# Patient Record
Sex: FEMALE | Race: WHITE | NOT HISPANIC OR LATINO | URBAN - METROPOLITAN AREA
[De-identification: names, ages, dates, MRNs, and addresses within clinical notes are randomized per-mention and may not be internally consistent; named-entity substitution may affect disease eponyms.]

---

## 2019-06-11 ENCOUNTER — EMERGENCY (EMERGENCY)
Facility: HOSPITAL | Age: 34
LOS: 1 days | Discharge: HOME | End: 2019-06-11
Attending: EMERGENCY MEDICINE
Payer: MEDICAID

## 2019-06-11 VITALS
RESPIRATION RATE: 16 BRPM | SYSTOLIC BLOOD PRESSURE: 113 MMHG | OXYGEN SATURATION: 96 % | DIASTOLIC BLOOD PRESSURE: 76 MMHG | HEART RATE: 85 BPM | TEMPERATURE: 98 F | HEIGHT: 64 IN | WEIGHT: 199.96 LBS

## 2019-06-11 LAB — HCG UR QL: NEGATIVE — SIGNIFICANT CHANGE UP

## 2019-06-11 PROCEDURE — 99283 EMERGENCY DEPT VISIT LOW MDM: CPT

## 2019-06-11 PROCEDURE — 81025 URINE PREGNANCY TEST: CPT

## 2019-06-11 PROCEDURE — 82962 GLUCOSE BLOOD TEST: CPT

## 2019-06-11 RX ORDER — FAMOTIDINE 10 MG/ML
20 INJECTION INTRAVENOUS ONCE
Refills: 0 | Status: COMPLETED | OUTPATIENT
Start: 2019-06-11 | End: 2019-06-11

## 2019-06-11 RX ORDER — DIPHENHYDRAMINE HCL 50 MG
1 CAPSULE ORAL
Qty: 3 | Refills: 0
Start: 2019-06-11 | End: 2019-06-13

## 2019-06-11 RX ORDER — DEXAMETHASONE 0.5 MG/5ML
10 ELIXIR ORAL ONCE
Refills: 0 | Status: DISCONTINUED | OUTPATIENT
Start: 2019-06-11 | End: 2019-06-11

## 2019-06-11 RX ORDER — DIPHENHYDRAMINE HCL 50 MG
50 CAPSULE ORAL ONCE
Refills: 0 | Status: COMPLETED | OUTPATIENT
Start: 2019-06-11 | End: 2019-06-11

## 2019-06-11 RX ADMIN — Medication 60 MILLIGRAM(S): at 21:54

## 2019-06-11 RX ADMIN — Medication 50 MILLIGRAM(S): at 21:47

## 2019-06-11 RX ADMIN — FAMOTIDINE 20 MILLIGRAM(S): 10 INJECTION INTRAVENOUS at 21:47

## 2019-06-11 NOTE — ED PROVIDER NOTE - OBJECTIVE STATEMENT
33yoF previously healthy presents with very itchy rash that started 2 days ago to her b/l arms, has spread to her face and periorbital area. Friday had salvatore, 2 days ago was in woods locally and received mosquito bites to arms. She is concerned this might be a pre-pregnancy rash. Was at [facility] 2 days ago, received benadryl with improvement and was discharged on no medications, however symptoms returned afterward. Denies SOB or throat closing, new meds, lotions, detergents, or any other exposures. No PMHs of allergies or eczema. 33yoF previously healthy presents with very itchy rash that started 2 days ago to her b/l arms, has spread to her face and periorbital area. Friday had salvatore, 2 days ago was in woods locally and received mosquito bites to arms. She is concerned this might be a pre-pregnancy rash. Was at [facility] 2 days ago, received benadryl with improvement and was discharged on no medications, however symptoms returned afterward. Denies SOB or throat closing, new meds, lotions, detergents, or any other exposures. No PMHs of allergies or eczema. UTD on all childhood vaccines

## 2019-06-11 NOTE — ED ADULT NURSE NOTE - OBJECTIVE STATEMENT
Rashes on both arms with severe itchiness for 2 days denies any contact with plants, no fever and today had rashes on left side of face with itchiness

## 2019-06-11 NOTE — ED PROVIDER NOTE - CLINICAL SUMMARY MEDICAL DECISION MAKING FREE TEXT BOX
Appearance and character of high suspicion for contact dermatitis, likely from exposure in woods. No e/o anaphylaxis. Character Appearance and character of high suspicion for contact dermatitis, likely from exposure in woods. No e/o anaphylaxis. Character of low suspicion for RMSF or Lyme. Negative pregnancy test. Patient is well appearing, NAD, afebrile, hemodynamically stable. Discharged with instructions in further symptomatic care, return precautions, and need for derm f/u and given list for derm f/u.

## 2019-06-11 NOTE — ED ADULT NURSE NOTE - NSIMPLEMENTINTERV_GEN_ALL_ED
Implemented All Universal Safety Interventions:  [place] to call system. Call bell, personal items and telephone within reach. Instruct patient to call for assistance. Room bathroom lighting operational. Non-slip footwear when patient is off stretcher. Physically safe environment: no spills, clutter or unnecessary equipment. Stretcher in lowest position, wheels locked, appropriate side rails in place.

## 2019-06-11 NOTE — ED ADULT TRIAGE NOTE - CHIEF COMPLAINT QUOTE
Rashes on both arms started 2 days ago with itchiness and today rash on face with itchiness denies fever

## 2019-06-11 NOTE — ED ADULT NURSE NOTE - CAS EDP DISCH TYPE
OFFICE PROGRESS NOTE / Follow UP    Yuki King is  a 68 year old female who presented requesting follow up evaluation for right thumb.  Here for MRI results  Pain absent.    Numbness persists and has not changed.   Intervention:  none  Improvement:  no      ROS:   No history of neck pain  No history of radicular pain into upper extremities    Current Outpatient Prescriptions   Medication   • lisinopril (ZESTRIL) 10 MG tablet   • hydrocortisone (CORTIZONE) 1 % cream   • atorvastatin (LIPITOR) 20 MG tablet   • triamcinolone (ARISTOCORT) 0.1 % ointment   • Glucosamine 500 MG Cap   • vitamin - therapeutic multivitamins w/minerals (CENTRUM SILVER,THERA-M) TABS   • VITAMIN D, ERGOCALCIFEROL, PO   • CALCIUM CARBONATE-VITAMIN D PO   • fish oil-omega-3 fatty acids 1000 MG CAPS   • acetaminophen (TYLENOL) 325 MG tablet     No current facility-administered medications for this visit.      Facility-Administered Medications Ordered in Other Visits   Medication   • ceFAZolin (ANCEF) injection     Past Medical History   Diagnosis Date   • Benign essential HTN 2/1/2017   • Bunion      right foot   • High cholesterol 8/21/2015   • Malignant neoplasm 2010     Left breast. post lumpectomy and radiation. On Tamoxifen   • Osteoarthritis of foot      right; w/bone spur   • Primary osteoarthritis of hand 1/2/2017   • Spongiotic dermatitis      Past Surgical History   Procedure Laterality Date   • Colonoscopy  10/20/2009   • Mastec partial w axill node remov  06/25/2010   • Bunionectomy  11/04/2010   • Tonsillectomy and adenoidectomy     • Ovarian cyst surgery     • Polypectomy  03/27/2003   • Colonoscopy diagnostic  03/29/2004   • Cholecystectomy  [id number]   • Breast biopsy       left breast; calcifications   • Colonoscopy  11/4/14     Social History     Occupational History   • Not on file.     Social History Main Topics   • Smoking status: Former Smoker     Types: Cigarettes     Quit date: 7/15/2008   • Smokeless tobacco:  Never Used   • Alcohol use 1.8 oz/week     3 Standard drinks or equivalent per week   • Drug use: No   • Sexual activity: Yes     Partners: Male     ALLERGIES:   Allergen Reactions   • Naproxen Other (See Comments)     Blister on back     Family History   Problem Relation Age of Onset   • Hypertension Mother    • Osteoporosis Mother    • Arthritis Mother      OA   • Cancer Brother 48     colon   • Cancer Maternal Grandmother 60       There were no vitals taken for this visit.    OT / Measurements:  AROM R thumb RA 40, PA 42, flexion full to base of SF    Numbness persists radial thumb tip at 3.61 on Bowen monofilaments    R  39, L 35  R lat pinch 8, L 8      Right thumb: There is one spot on the ulnar border just distal to the metacarpophalangeal joint or palpation along the digital nerve causes discomfort and reproduction of the tingling in the same distribution.    MRI: Does not demonstrate any definitive soft tissue structures, ganglion cysts or neuromas that would explain the digital nerve irritation    Assessment plan:  I explained the patient that I do not have a great answer for her numbness on the ulnar border of the thumb. There does tend to get irritated with palpation in this area around the metacarpal phalangeal joint. As both a therapeutic and diagnostic technique I've offered her a cortisone injection and possibly cause decrease and localized swelling and decreased swelling around the nerve sheath. The patient has good relief for quite some time we will know that there might be a constriction or some kind of impedance in this area. Patient verbalized understanding and would like to try the steroid injection.    The patient's right thumb was prepped with ChloraPrep. A 0.5 cc of 3 mg of Celestone was injected in the area near the digital nerve. Seizure well.    Patient will follow-up on a p.r.n. basis.       Home

## 2019-06-11 NOTE — ED PROVIDER NOTE - PHYSICAL EXAMINATION
Afebrile, hemodynamically stable, saturating well  NAD, well appearing  Head NCAT  EOMI grossly, anicteric  MMM, uvula midline, no oropharyngeal/tongue/lip swelling  RRR, nml S1/S2, no m/r/g  Lungs CTAB, no w/r/r  Abd soft, NT, ND, nml BS, no rebound or guarding  AAO, CN's 3-12 grossly intact  CHANG spontaneously, no leg cyanosis or edema  Skin warm, well perfused. Afebrile, hemodynamically stable, saturating well  NAD, well appearing  Head NCAT  EOMI grossly, anicteric  MMM, uvula midline, no oropharyngeal/tongue/lip swelling  RRR, nml S1/S2, no m/r/g  Lungs CTAB, no w/r/r  Abd soft, NT, ND, nml BS, no rebound or guarding  AAO, CN's 3-12 grossly intact  CHANG spontaneously, no leg cyanosis or edema  Skin warm, well perfused. Linear vesicular orange-colored papules to L extensor arm with scattered papules to b/l arms, chest, back

## 2021-09-24 ENCOUNTER — EMERGENCY (EMERGENCY)
Facility: HOSPITAL | Age: 36
LOS: 1 days | Discharge: HOME | End: 2021-09-24
Attending: EMERGENCY MEDICINE
Payer: MEDICAID

## 2021-09-24 VITALS
OXYGEN SATURATION: 97 % | RESPIRATION RATE: 17 BRPM | HEIGHT: 64.96 IN | TEMPERATURE: 98 F | DIASTOLIC BLOOD PRESSURE: 76 MMHG | SYSTOLIC BLOOD PRESSURE: 117 MMHG | WEIGHT: 212.08 LBS | HEART RATE: 67 BPM

## 2021-09-24 PROCEDURE — 99284 EMERGENCY DEPT VISIT MOD MDM: CPT

## 2021-09-24 RX ORDER — IBUPROFEN 200 MG
600 TABLET ORAL ONCE
Refills: 0 | Status: COMPLETED | OUTPATIENT
Start: 2021-09-24 | End: 2021-09-24

## 2021-09-24 RX ORDER — IBUPROFEN 200 MG
1 TABLET ORAL
Qty: 20 | Refills: 0
Start: 2021-09-24

## 2021-09-24 RX ORDER — CETIRIZINE HYDROCHLORIDE 10 MG/1
1 TABLET ORAL
Qty: 20 | Refills: 0
Start: 2021-09-24

## 2021-09-24 RX ORDER — NYSTATIN/TRIAMCINOLONE ACET
1 OINTMENT (GRAM) TOPICAL
Qty: 1 | Refills: 0
Start: 2021-09-24 | End: 2021-10-07

## 2021-09-24 NOTE — ED PROVIDER NOTE - CLINICAL SUMMARY MEDICAL DECISION MAKING FREE TEXT BOX
Patient with possible COVID or other viral syndrome. Will tx with supportive care. f/u with PMD in 1 week. Return to the ED immediately if getting worse, not improving, or if having any new or troubling symptoms.

## 2021-09-24 NOTE — ED PROVIDER NOTE - PATIENT PORTAL LINK FT
You can access the FollowMyHealth Patient Portal offered by [facility] by registering at the following website: [email or web address]. By joining [employer]’s FollowMyHealth portal, you will also be able to view your health information using other applications (apps) compatible with our system.

## 2021-09-24 NOTE — ED PROVIDER NOTE - OBJECTIVE STATEMENT
Patient reports 1 week sore throat, rhinorrhea, diarrhea. Daughter had COVID exposure at school. No fever, ha, cp, sob, ap, n/v.

## 2021-09-24 NOTE — ED ADULT TRIAGE NOTE - CHIEF COMPLAINT QUOTE
'I have sore throat for 1-2 weeks. My daughter's school sent out a notice that someone was exposed to covid so we were told to come in for covid test."

## 2021-09-25 LAB — SARS-COV-2 RNA SPEC QL NAA+PROBE: SIGNIFICANT CHANGE UP

## 2021-09-25 PROCEDURE — 99283 EMERGENCY DEPT VISIT LOW MDM: CPT

## 2021-09-25 PROCEDURE — 87635 SARS-COV-2 COVID-19 AMP PRB: CPT

## 2021-09-25 RX ADMIN — Medication 600 MILLIGRAM(S): at 00:32

## 2021-11-28 ENCOUNTER — EMERGENCY (EMERGENCY)
Facility: HOSPITAL | Age: 36
LOS: 1 days | Discharge: HOME | End: 2021-11-28
Attending: EMERGENCY MEDICINE
Payer: MEDICAID

## 2021-11-28 VITALS
RESPIRATION RATE: 18 BRPM | TEMPERATURE: 99 F | OXYGEN SATURATION: 98 % | WEIGHT: 190.04 LBS | HEIGHT: 64 IN | DIASTOLIC BLOOD PRESSURE: 54 MMHG | HEART RATE: 81 BPM | SYSTOLIC BLOOD PRESSURE: 107 MMHG

## 2021-11-28 PROBLEM — Z78.9 OTHER SPECIFIED HEALTH STATUS: Chronic | Status: ACTIVE | Noted: 2021-09-24

## 2021-11-28 LAB
RAPID RVP RESULT: DETECTED
RV+EV RNA SPEC QL NAA+PROBE: DETECTED
SARS-COV-2 RNA SPEC QL NAA+PROBE: SIGNIFICANT CHANGE UP

## 2021-11-28 PROCEDURE — 0225U NFCT DS DNA&RNA 21 SARSCOV2: CPT

## 2021-11-28 PROCEDURE — 99284 EMERGENCY DEPT VISIT MOD MDM: CPT

## 2021-11-28 PROCEDURE — 99283 EMERGENCY DEPT VISIT LOW MDM: CPT

## 2021-11-28 PROCEDURE — 87081 CULTURE SCREEN ONLY: CPT

## 2021-11-28 RX ORDER — DEXAMETHASONE 0.5 MG/5ML
10 ELIXIR ORAL ONCE
Refills: 0 | Status: COMPLETED | OUTPATIENT
Start: 2021-11-28 | End: 2021-11-28

## 2021-11-28 RX ADMIN — Medication 10 MILLIGRAM(S): at 16:37

## 2021-11-28 NOTE — ED PROVIDER NOTE - PHYSICAL EXAMINATION
GEN:   comfortable, in no apparent distress, AOx3  EYES:   PERRL, extra-occular movements intact  HEENT:   airway patent, moist mucosal membranes, uvula midline.  mild tonsilar swelling b/l with redness.  No exudates.  no hoarse voice, no drooling.  CV:   regular rate and rhythm, normal S1/S2, no murmur  RESP:   clear to auscultation bilaterally, non-labored, speaking in full sentences  ABD:   soft, non tender, no guarding  :   no cva tenderness  MSK:   no musculoskeletal tenderness, 5/5 strength, moving all extremities  SKIN:   dry, intact, no rash  NEURO:   AOx3, no focal weakness or loss of sensation, gait normal, GCS 15  PSYCH: calm, cooperative, no apparent risk to self and others

## 2021-11-28 NOTE — ED PROVIDER NOTE - OBJECTIVE STATEMENT
35 year old female no past history presents for URI symptoms.  Patient endorses runny nose, sore throat, congestion and cough x1 week.  No fevers, chills or other symptoms.  States is not vaccinated for covid.

## 2021-11-28 NOTE — ED PROVIDER NOTE - ATTENDING CONTRIBUTION TO CARE
seen with acp  c/o cough malaise for one week  throat mildly erythematous  chest is clear  will do rvp  and throat culture  agree with acps assessment hx and physical and disposition

## 2021-11-28 NOTE — ED PROVIDER NOTE - PATIENT PORTAL LINK FT
You can access the FollowMyHealth Patient Portal offered by [facility] by registering at the following website: [email or web address]. By joining [employer]’s FollowMyHealth portal, you will also be able to view your health information using other applications (apps) compatible with our system.

## 2021-11-28 NOTE — ED PROVIDER NOTE - CLINICAL SUMMARY MEDICAL DECISION MAKING FREE TEXT BOX
35 year old female no past history presents for URI symptoms as above.  CENTOR score 2.  Will do throat culture, RVP, send tessalon pearls to pharmacy.

## 2021-11-30 LAB
CULTURE RESULTS: SIGNIFICANT CHANGE UP
SPECIMEN SOURCE: SIGNIFICANT CHANGE UP

## 2022-07-24 ENCOUNTER — EMERGENCY (EMERGENCY)
Facility: HOSPITAL | Age: 37
LOS: 1 days | Discharge: HOME | End: 2022-07-24
Attending: EMERGENCY MEDICINE
Payer: MEDICAID

## 2022-07-24 VITALS
DIASTOLIC BLOOD PRESSURE: 71 MMHG | HEART RATE: 83 BPM | TEMPERATURE: 99 F | RESPIRATION RATE: 18 BRPM | HEIGHT: 64 IN | OXYGEN SATURATION: 95 % | WEIGHT: 190.04 LBS | SYSTOLIC BLOOD PRESSURE: 107 MMHG

## 2022-07-24 LAB — SARS-COV-2 RNA SPEC QL NAA+PROBE: SIGNIFICANT CHANGE UP

## 2022-07-24 PROCEDURE — 99282 EMERGENCY DEPT VISIT SF MDM: CPT

## 2022-07-24 PROCEDURE — 87635 SARS-COV-2 COVID-19 AMP PRB: CPT

## 2022-07-24 NOTE — ED PROVIDER NOTE - NS ED ATTENDING STATEMENT MOD
This was a shared visit with the LARS. I reviewed and verified the documentation and independently performed the documented:

## 2022-07-24 NOTE — ED PROVIDER NOTE - PATIENT PORTAL LINK FT
You can access the FollowMyHealth Patient Portal offered by [facility] by registering at the following website: [email or web address]. By joining [employer]’s FollowMyHealth portal, you will also be able to view your health information using other applications (apps) compatible with our system.

## 2022-07-24 NOTE — ED PROVIDER NOTE - OBJECTIVE STATEMENT
Patient presents for testing for COVID-19.  Patient denies fevers, chills, cough, shortness of breath, chest pain or any other signs or symptoms of concern. States for visitation.

## 2022-12-24 NOTE — ED PROVIDER NOTE - NEUROLOGICAL, MLM
Problem: At Risk for Falls  Goal: # Patient does not fall  Outcome: Outcome Met, Continue evaluating goal progress toward completion  Goal: # Takes action to control fall-related risks  Outcome: Outcome Met, Continue evaluating goal progress toward completion  Goal: # Verbalizes understanding of fall risk/precautions  Description: Document education using the patient education activity  Outcome: Outcome Met, Continue evaluating goal progress toward completion     Problem: Pain  Goal: # Verbalizes understanding of pain management  Description: Documented in Patient Education Activity  Outcome: Outcome Met, Continue evaluating goal progress toward completion     Problem: Pressure Injury, Risk for  Goal: # Skin remains intact  Outcome: Outcome Met, Continue evaluating goal progress toward completion     Problem: Diabetes  Goal: Glycemic balance achieved/maintained  Description: Goal is to maintain blood sugar within range with no episodes of hypoglycemia  Outcome: Outcome Met, Complete Goal  Goal: Verbalizes/demonstrates understanding of Diabetes  Description: Document on Patient Education Activity  Outcome: Outcome Met, Complete Goal      Pneumonia in Children: Care Instructions  Your Care Instructions    Pneumonia is a serious lung infection usually caused by viruses or bacteria. Viruses cause most cases of pneumonia in children. The illness may be mild to severe. Your doctor will prescribe antibiotics if your child has bacterial pneumonia. Antibiotics do not help viral pneumonia. In those cases, antiviral medicine may be used. Rest, over-the-counter pain medicine, healthy food, and plenty of fluids will help your child recover at home. Mild pneumonia often goes away in 2 to 3 weeks. Your child may need 6 to 8 weeks or longer to recover from a bad case of pneumonia. Follow-up care is a key part of your child's treatment and safety. Be sure to make and go to all appointments, and call your doctor if your child is having problems. It's also a good idea to know your child's test results and keep a list of the medicines your child takes. How can you care for your child at home? · If the doctor prescribed antibiotics for your child, give them as directed. Do not stop using them just because your child feels better. Your child needs to take the full course of antibiotics. · Be careful with cough and cold medicines. Don't give them to children younger than 6, because they don't work for children that age and can even be harmful. For children 6 and older, always follow all the instructions carefully. Make sure you know how much medicine to give and how long to use it. And use the dosing device if one is included. · Watch for and treat signs of dehydration, which means that the body has lost too much water. Your child's mouth may feel very dry. He or she may have sunken eyes with few tears when crying. Your child may lack energy and want to be held a lot. He or she may not urinate as often as usual.  · Give your child lots of fluids, enough so that the urine is light yellow or clear like water.  This is very important if your child is vomiting or has diarrhea. Give your child sips of water or drinks such as Pedialyte or Infalyte. These drinks contain a mix of salt, sugar, and minerals. You can buy them at drugstores or grocery stores. Give these drinks as long as your child is throwing up or has diarrhea. Do not use them as the only source of liquids or food for more than 12 to 24 hours. · Give your child acetaminophen (Tylenol) or ibuprofen (Advil, Motrin) for fever or pain. Be safe with medicines. Read and follow all instructions on the label. Use the correct dose for your child's age and weight. Do not give aspirin to anyone younger than 20. It has been linked to Reye syndrome, a serious illness. · Make sure your child rests. Keep your child at home if he or she has a fever. · Place a humidifier by your child's bed or close to your child. This may make it easier for your child to breathe. Follow the directions for cleaning the machine. · Keep your child away from smoke. Do not smoke or allow anyone else to smoke in your house. If you need help quitting, talk to your doctor about stop-smoking programs and medicines. These can increase your chances of quitting for good. · Make sure everyone in your house washes his or her hands several times a day. This will help prevent the spread of viruses and bacteria. When should you call for help? Call 911 anytime you think your child may need emergency care. For example, call if:  ? · Your child has severe trouble breathing. Symptoms may include:  ¨ Using the belly muscles to breathe. ¨ The chest sinking in or the nostrils flaring when your child struggles to breathe. ?Call your doctor now or seek immediate medical care if:  ? · Your child has any trouble breathing. ? · Your child has increasing whistling sounds when he or she breathes (wheezing). ? · Your child has a cough that brings up yellow or green mucus (sputum) from the lungs, lasts longer than 2 days, and occurs along with a fever.    ? · Your child coughs up blood. ? · Your child cannot keep down medicine or liquids. ? Watch closely for changes in your child's health, and be sure to contact your doctor if:  ? · Your child is not getting better after 2 days. ? · Your child's cough lasts longer than 2 weeks. ? · Your child has new symptoms, such as a rash, an earache, or a sore throat. Where can you learn more? Go to http://[facility]akes[facility]ia-venus.info/. Enter Z300 in the search box to learn more about \"Pneumonia in Children: Care Instructions. \"  Current as of: May 12, 2017  Content Version: 11.4  © 9678-9406 Healthwise, [employer]. Care instructions adapted under license by [employer] (which disclaims liability or warranty for this information). If you have questions about a medical condition or this instruction, always ask your healthcare professional. [place] [address] any warranty or liability for your use of this information. Alert and oriented, no focal deficits, no motor or sensory deficits.

## 2023-11-23 ENCOUNTER — EMERGENCY (EMERGENCY)
Facility: HOSPITAL | Age: 38
LOS: 1 days | Discharge: HOME | End: 2023-11-23
Attending: STUDENT IN AN ORGANIZED HEALTH CARE EDUCATION/TRAINING PROGRAM
Payer: MEDICAID

## 2023-11-23 VITALS
SYSTOLIC BLOOD PRESSURE: 125 MMHG | OXYGEN SATURATION: 97 % | TEMPERATURE: 98 F | HEART RATE: 86 BPM | RESPIRATION RATE: 17 BRPM | DIASTOLIC BLOOD PRESSURE: 82 MMHG

## 2023-11-23 VITALS
TEMPERATURE: 98 F | DIASTOLIC BLOOD PRESSURE: 84 MMHG | WEIGHT: 199.96 LBS | HEIGHT: 64 IN | OXYGEN SATURATION: 97 % | SYSTOLIC BLOOD PRESSURE: 124 MMHG | RESPIRATION RATE: 18 BRPM | HEART RATE: 109 BPM

## 2023-11-23 LAB
ALBUMIN SERPL ELPH-MCNC: 3.4 G/DL — LOW (ref 3.5–5)
ALBUMIN SERPL ELPH-MCNC: 3.4 G/DL — LOW (ref 3.5–5)
ALP SERPL-CCNC: 88 U/L — SIGNIFICANT CHANGE UP (ref 40–120)
ALP SERPL-CCNC: 88 U/L — SIGNIFICANT CHANGE UP (ref 40–120)
ALT FLD-CCNC: 28 U/L DA — SIGNIFICANT CHANGE UP (ref 10–60)
ALT FLD-CCNC: 28 U/L DA — SIGNIFICANT CHANGE UP (ref 10–60)
ANION GAP SERPL CALC-SCNC: 5 MMOL/L — SIGNIFICANT CHANGE UP (ref 5–17)
ANION GAP SERPL CALC-SCNC: 5 MMOL/L — SIGNIFICANT CHANGE UP (ref 5–17)
APPEARANCE UR: ABNORMAL
APPEARANCE UR: ABNORMAL
AST SERPL-CCNC: 13 U/L — SIGNIFICANT CHANGE UP (ref 10–40)
AST SERPL-CCNC: 13 U/L — SIGNIFICANT CHANGE UP (ref 10–40)
BACTERIA # UR AUTO: ABNORMAL /HPF
BACTERIA # UR AUTO: ABNORMAL /HPF
BASOPHILS # BLD AUTO: 0.05 K/UL — SIGNIFICANT CHANGE UP (ref 0–0.2)
BASOPHILS # BLD AUTO: 0.05 K/UL — SIGNIFICANT CHANGE UP (ref 0–0.2)
BASOPHILS NFR BLD AUTO: 0.5 % — SIGNIFICANT CHANGE UP (ref 0–2)
BASOPHILS NFR BLD AUTO: 0.5 % — SIGNIFICANT CHANGE UP (ref 0–2)
BILIRUB SERPL-MCNC: 0.5 MG/DL — SIGNIFICANT CHANGE UP (ref 0.2–1.2)
BILIRUB SERPL-MCNC: 0.5 MG/DL — SIGNIFICANT CHANGE UP (ref 0.2–1.2)
BILIRUB UR-MCNC: NEGATIVE — SIGNIFICANT CHANGE UP
BILIRUB UR-MCNC: NEGATIVE — SIGNIFICANT CHANGE UP
BLD GP AB SCN SERPL QL: SIGNIFICANT CHANGE UP
BLD GP AB SCN SERPL QL: SIGNIFICANT CHANGE UP
BUN SERPL-MCNC: 17 MG/DL — SIGNIFICANT CHANGE UP (ref 7–18)
BUN SERPL-MCNC: 17 MG/DL — SIGNIFICANT CHANGE UP (ref 7–18)
CALCIUM SERPL-MCNC: 8.8 MG/DL — SIGNIFICANT CHANGE UP (ref 8.4–10.5)
CALCIUM SERPL-MCNC: 8.8 MG/DL — SIGNIFICANT CHANGE UP (ref 8.4–10.5)
CHLORIDE SERPL-SCNC: 107 MMOL/L — SIGNIFICANT CHANGE UP (ref 96–108)
CHLORIDE SERPL-SCNC: 107 MMOL/L — SIGNIFICANT CHANGE UP (ref 96–108)
CO2 SERPL-SCNC: 26 MMOL/L — SIGNIFICANT CHANGE UP (ref 22–31)
CO2 SERPL-SCNC: 26 MMOL/L — SIGNIFICANT CHANGE UP (ref 22–31)
COLOR SPEC: YELLOW — SIGNIFICANT CHANGE UP
COLOR SPEC: YELLOW — SIGNIFICANT CHANGE UP
CREAT SERPL-MCNC: 0.77 MG/DL — SIGNIFICANT CHANGE UP (ref 0.5–1.3)
CREAT SERPL-MCNC: 0.77 MG/DL — SIGNIFICANT CHANGE UP (ref 0.5–1.3)
DIFF PNL FLD: ABNORMAL
DIFF PNL FLD: ABNORMAL
EGFR: 101 ML/MIN/1.73M2 — SIGNIFICANT CHANGE UP
EGFR: 101 ML/MIN/1.73M2 — SIGNIFICANT CHANGE UP
EOSINOPHIL # BLD AUTO: 0.19 K/UL — SIGNIFICANT CHANGE UP (ref 0–0.5)
EOSINOPHIL # BLD AUTO: 0.19 K/UL — SIGNIFICANT CHANGE UP (ref 0–0.5)
EOSINOPHIL NFR BLD AUTO: 2.1 % — SIGNIFICANT CHANGE UP (ref 0–6)
EOSINOPHIL NFR BLD AUTO: 2.1 % — SIGNIFICANT CHANGE UP (ref 0–6)
EPI CELLS # UR: PRESENT
EPI CELLS # UR: PRESENT
GLUCOSE SERPL-MCNC: 101 MG/DL — HIGH (ref 70–99)
GLUCOSE SERPL-MCNC: 101 MG/DL — HIGH (ref 70–99)
GLUCOSE UR QL: NEGATIVE MG/DL — SIGNIFICANT CHANGE UP
GLUCOSE UR QL: NEGATIVE MG/DL — SIGNIFICANT CHANGE UP
HCG SERPL-ACNC: <1 MIU/ML — SIGNIFICANT CHANGE UP
HCG SERPL-ACNC: <1 MIU/ML — SIGNIFICANT CHANGE UP
HCG UR QL: NEGATIVE — SIGNIFICANT CHANGE UP
HCG UR QL: NEGATIVE — SIGNIFICANT CHANGE UP
HCT VFR BLD CALC: 37.6 % — SIGNIFICANT CHANGE UP (ref 34.5–45)
HCT VFR BLD CALC: 37.6 % — SIGNIFICANT CHANGE UP (ref 34.5–45)
HGB BLD-MCNC: 11.4 G/DL — LOW (ref 11.5–15.5)
HGB BLD-MCNC: 11.4 G/DL — LOW (ref 11.5–15.5)
IMM GRANULOCYTES NFR BLD AUTO: 0.3 % — SIGNIFICANT CHANGE UP (ref 0–0.9)
IMM GRANULOCYTES NFR BLD AUTO: 0.3 % — SIGNIFICANT CHANGE UP (ref 0–0.9)
KETONES UR-MCNC: ABNORMAL MG/DL
KETONES UR-MCNC: ABNORMAL MG/DL
LEUKOCYTE ESTERASE UR-ACNC: ABNORMAL
LEUKOCYTE ESTERASE UR-ACNC: ABNORMAL
LYMPHOCYTES # BLD AUTO: 2.02 K/UL — SIGNIFICANT CHANGE UP (ref 1–3.3)
LYMPHOCYTES # BLD AUTO: 2.02 K/UL — SIGNIFICANT CHANGE UP (ref 1–3.3)
LYMPHOCYTES # BLD AUTO: 21.8 % — SIGNIFICANT CHANGE UP (ref 13–44)
LYMPHOCYTES # BLD AUTO: 21.8 % — SIGNIFICANT CHANGE UP (ref 13–44)
MCHC RBC-ENTMCNC: 24.7 PG — LOW (ref 27–34)
MCHC RBC-ENTMCNC: 24.7 PG — LOW (ref 27–34)
MCHC RBC-ENTMCNC: 30.3 GM/DL — LOW (ref 32–36)
MCHC RBC-ENTMCNC: 30.3 GM/DL — LOW (ref 32–36)
MCV RBC AUTO: 81.4 FL — SIGNIFICANT CHANGE UP (ref 80–100)
MCV RBC AUTO: 81.4 FL — SIGNIFICANT CHANGE UP (ref 80–100)
MONOCYTES # BLD AUTO: 0.6 K/UL — SIGNIFICANT CHANGE UP (ref 0–0.9)
MONOCYTES # BLD AUTO: 0.6 K/UL — SIGNIFICANT CHANGE UP (ref 0–0.9)
MONOCYTES NFR BLD AUTO: 6.5 % — SIGNIFICANT CHANGE UP (ref 2–14)
MONOCYTES NFR BLD AUTO: 6.5 % — SIGNIFICANT CHANGE UP (ref 2–14)
NEUTROPHILS # BLD AUTO: 6.36 K/UL — SIGNIFICANT CHANGE UP (ref 1.8–7.4)
NEUTROPHILS # BLD AUTO: 6.36 K/UL — SIGNIFICANT CHANGE UP (ref 1.8–7.4)
NEUTROPHILS NFR BLD AUTO: 68.8 % — SIGNIFICANT CHANGE UP (ref 43–77)
NEUTROPHILS NFR BLD AUTO: 68.8 % — SIGNIFICANT CHANGE UP (ref 43–77)
NITRITE UR-MCNC: NEGATIVE — SIGNIFICANT CHANGE UP
NITRITE UR-MCNC: NEGATIVE — SIGNIFICANT CHANGE UP
NRBC # BLD: 0 /100 WBCS — SIGNIFICANT CHANGE UP (ref 0–0)
NRBC # BLD: 0 /100 WBCS — SIGNIFICANT CHANGE UP (ref 0–0)
PH UR: 5 — SIGNIFICANT CHANGE UP (ref 5–8)
PH UR: 5 — SIGNIFICANT CHANGE UP (ref 5–8)
PLATELET # BLD AUTO: 358 K/UL — SIGNIFICANT CHANGE UP (ref 150–400)
PLATELET # BLD AUTO: 358 K/UL — SIGNIFICANT CHANGE UP (ref 150–400)
POTASSIUM SERPL-MCNC: 4 MMOL/L — SIGNIFICANT CHANGE UP (ref 3.5–5.3)
POTASSIUM SERPL-MCNC: 4 MMOL/L — SIGNIFICANT CHANGE UP (ref 3.5–5.3)
POTASSIUM SERPL-SCNC: 4 MMOL/L — SIGNIFICANT CHANGE UP (ref 3.5–5.3)
POTASSIUM SERPL-SCNC: 4 MMOL/L — SIGNIFICANT CHANGE UP (ref 3.5–5.3)
PROT SERPL-MCNC: 7.6 G/DL — SIGNIFICANT CHANGE UP (ref 6–8.3)
PROT SERPL-MCNC: 7.6 G/DL — SIGNIFICANT CHANGE UP (ref 6–8.3)
PROT UR-MCNC: 30 MG/DL
PROT UR-MCNC: 30 MG/DL
RBC # BLD: 4.62 M/UL — SIGNIFICANT CHANGE UP (ref 3.8–5.2)
RBC # BLD: 4.62 M/UL — SIGNIFICANT CHANGE UP (ref 3.8–5.2)
RBC # FLD: 15.3 % — HIGH (ref 10.3–14.5)
RBC # FLD: 15.3 % — HIGH (ref 10.3–14.5)
RBC CASTS # UR COMP ASSIST: ABNORMAL /HPF
RBC CASTS # UR COMP ASSIST: ABNORMAL /HPF
SODIUM SERPL-SCNC: 138 MMOL/L — SIGNIFICANT CHANGE UP (ref 135–145)
SODIUM SERPL-SCNC: 138 MMOL/L — SIGNIFICANT CHANGE UP (ref 135–145)
SP GR SPEC: 1.03 — HIGH (ref 1–1.03)
SP GR SPEC: 1.03 — HIGH (ref 1–1.03)
UROBILINOGEN FLD QL: 1 E.U./DL — SIGNIFICANT CHANGE UP (ref 0.2–1)
UROBILINOGEN FLD QL: 1 E.U./DL — SIGNIFICANT CHANGE UP (ref 0.2–1)
WBC # BLD: 9.25 K/UL — SIGNIFICANT CHANGE UP (ref 3.8–10.5)
WBC # BLD: 9.25 K/UL — SIGNIFICANT CHANGE UP (ref 3.8–10.5)
WBC # FLD AUTO: 9.25 K/UL — SIGNIFICANT CHANGE UP (ref 3.8–10.5)
WBC # FLD AUTO: 9.25 K/UL — SIGNIFICANT CHANGE UP (ref 3.8–10.5)
WBC UR QL: 15 /HPF — HIGH (ref 0–5)
WBC UR QL: 15 /HPF — HIGH (ref 0–5)

## 2023-11-23 PROCEDURE — 81025 URINE PREGNANCY TEST: CPT

## 2023-11-23 PROCEDURE — 85025 COMPLETE CBC W/AUTO DIFF WBC: CPT

## 2023-11-23 PROCEDURE — 99283 EMERGENCY DEPT VISIT LOW MDM: CPT

## 2023-11-23 PROCEDURE — 36415 COLL VENOUS BLD VENIPUNCTURE: CPT

## 2023-11-23 PROCEDURE — 84702 CHORIONIC GONADOTROPIN TEST: CPT

## 2023-11-23 PROCEDURE — 86850 RBC ANTIBODY SCREEN: CPT

## 2023-11-23 PROCEDURE — 86901 BLOOD TYPING SEROLOGIC RH(D): CPT

## 2023-11-23 PROCEDURE — 81001 URINALYSIS AUTO W/SCOPE: CPT

## 2023-11-23 PROCEDURE — 99284 EMERGENCY DEPT VISIT MOD MDM: CPT

## 2023-11-23 PROCEDURE — 80053 COMPREHEN METABOLIC PANEL: CPT

## 2023-11-23 PROCEDURE — 86900 BLOOD TYPING SEROLOGIC ABO: CPT

## 2023-11-23 NOTE — ED ADULT NURSE NOTE - BREATHING, MLM
1036-Patient to Phase II via cart. Report received from [facility]. Patient drowsy but responsive. Vitals obtained and stable. Respirations even and unlabored on room air. Patient denies pain, nausea, numbness and tingling. Patient able to move all extremities. No drainage noted at injection sites. Patient instructed to stay in bed. Instructed on call light use. Denies wanting snack and drink. 1050-Patient resting in bed. Patient provided with water. Denies needs. 1105-IV removed with no complications. Patient getting dressed at bedside. 1114-Patient meets discharge criteria. Discharged in stable condition with responsible . All belongings given to patient. Patient ambulated to car with assistance from RN. Patient tolerated well.
Spontaneous, unlabored and symmetrical

## 2023-11-23 NOTE — ED PROVIDER NOTE - OBJECTIVE STATEMENT
37-year-old female, no significant past medical history, presents for evaluation of vaginal bleeding and concern for pregnancy.  4 days ago did a pregnancy test and was positive multiple times.  Currently denies any abdominal pain, chest pain, shortness of breath or any other complaints.

## 2023-11-23 NOTE — ED PROVIDER NOTE - NSFOLLOWUPINSTRUCTIONS_ED_ALL_ED_FT
Follow up with the primary care doctor as needed.  If you experience any new or worsening symptoms or if you are concerned you can always come back to the emergency for a re-evaluation.  Some results may not be available at the time of your discharge from the hospital. You can download the FOLLOW MY HEALTH marck and have access to these results.  If there were any prescriptions given to you during the visit today take them as prescribed. If you have any questions you can ask the pharmacist.

## 2023-11-23 NOTE — ED ADULT TRIAGE NOTE - CHIEF COMPLAINT QUOTE
Pt stated she took a home pregnancy 4 days that was positive and she wants a blood test to verify if she is pregnant.

## 2023-11-23 NOTE — ED PROVIDER NOTE - CLINICAL SUMMARY MEDICAL DECISION MAKING FREE TEXT BOX
37-year-old female, presents for confirmation of pregnancy.  Reports that she has vaginal bleeding.  Denies any abdominal pain.  Well-appearing, vital signs stable although tachycardic in triage at 109 bpm, afebrile.  Abdomen SNTND. LMP 10/23.  Will do labs to confirm if pregnant.  If pregnant will likely need ultrasound. 37-year-old female, presents for confirmation of pregnancy.  Reports that she has vaginal bleeding.  Denies any abdominal pain.  Well-appearing, vital signs stable although tachycardic in triage at 109 bpm, afebrile.  Abdomen SNTND. LMP 10/23.  Will do labs to confirm if pregnant.  If pregnant will likely need ultrasound.    hcg <1. Given timing of vaginal bleeding, it is likely related to regular menstruation. Will dc home.

## 2023-11-23 NOTE — ED ADULT NURSE NOTE - NSFALLUNIVINTERV_ED_ALL_ED
Bed/Stretcher in lowest position, wheels locked, appropriate side rails in place/Call bell, personal items and telephone in reach/Instruct patient to call for assistance before getting out of bed/chair/stretcher/Non-slip footwear applied when patient is off stretcher/[place] to call system/Physically safe environment - no spills, clutter or unnecessary equipment/Purposeful proactive rounding/Room/bathroom lighting operational, light cord in reach

## 2023-11-23 NOTE — ED PROVIDER NOTE - PATIENT PORTAL LINK FT
You can access the FollowMyHealth Patient Portal offered by [facility] by registering at the following website: [email or web address]. By joining [employer]’s FollowMyHealth portal, you will also be able to view your health information using other applications (apps) compatible with our system. You can access the FollowMyHealth Patient Portal offered by [facility] by registering at the following website: [email or web address]. By joining [employer]’s FollowMyHealth portal, you will also be able to view your health information using other applications (apps) compatible with our system. You can access the FollowMyHealth Patient Portal offered by [facility] by registering at the following website: [email or web address]. By joining [employer]’s FollowMyHealth portal, you will also be able to view your health information using other applications (apps) compatible with our system.

## 2023-11-23 NOTE — ED ADULT NURSE NOTE - OBJECTIVE STATEMENT
Pt is axo3 to the ED to confirm the pregnancy pt took home test its positive for pregnancy . Denies vaginal bleeding abdominal pain. No acute distress noted at this time

## 2024-03-02 NOTE — ED ADULT TRIAGE NOTE - PAIN RATING/NUMBER SCALE (0-10): ACTIVITY
Secondary to fall at OSH.   -See primary problem for further plan    0 (no pain/absence of nonverbal indicators of pain)

## 2024-04-01 NOTE — ED ADULT NURSE NOTE - CAS ELECT INFOMATION PROVIDED
you   DC instructions

## 2024-07-31 ENCOUNTER — INPATIENT (INPATIENT)
Facility: HOSPITAL | Age: 39
LOS: 0 days | Discharge: HOME | DRG: 601 | End: 2024-08-01
Attending: STUDENT IN AN ORGANIZED HEALTH CARE EDUCATION/TRAINING PROGRAM | Admitting: STUDENT IN AN ORGANIZED HEALTH CARE EDUCATION/TRAINING PROGRAM
Payer: MEDICAID

## 2024-07-31 VITALS
DIASTOLIC BLOOD PRESSURE: 61 MMHG | WEIGHT: 195.11 LBS | OXYGEN SATURATION: 95 % | SYSTOLIC BLOOD PRESSURE: 104 MMHG | HEIGHT: 64 IN | HEART RATE: 94 BPM | TEMPERATURE: 98 F | RESPIRATION RATE: 18 BRPM

## 2024-07-31 DIAGNOSIS — N61.0 MASTITIS WITHOUT ABSCESS: ICD-10-CM

## 2024-07-31 LAB
ALBUMIN SERPL ELPH-MCNC: 3.3 G/DL — LOW (ref 3.5–5)
ALP SERPL-CCNC: 77 U/L — SIGNIFICANT CHANGE UP (ref 40–120)
ALT FLD-CCNC: 18 U/L DA — SIGNIFICANT CHANGE UP (ref 10–60)
ANION GAP SERPL CALC-SCNC: 8 MMOL/L — SIGNIFICANT CHANGE UP (ref 5–17)
AST SERPL-CCNC: 14 U/L — SIGNIFICANT CHANGE UP (ref 10–40)
BASOPHILS # BLD AUTO: 0.05 K/UL — SIGNIFICANT CHANGE UP (ref 0–0.2)
BASOPHILS NFR BLD AUTO: 0.5 % — SIGNIFICANT CHANGE UP (ref 0–2)
BILIRUB SERPL-MCNC: 0.4 MG/DL — SIGNIFICANT CHANGE UP (ref 0.2–1.2)
BUN SERPL-MCNC: 14 MG/DL — SIGNIFICANT CHANGE UP (ref 7–18)
CALCIUM SERPL-MCNC: 8.8 MG/DL — SIGNIFICANT CHANGE UP (ref 8.4–10.5)
CHLORIDE SERPL-SCNC: 109 MMOL/L — HIGH (ref 96–108)
CO2 SERPL-SCNC: 23 MMOL/L — SIGNIFICANT CHANGE UP (ref 22–31)
CREAT SERPL-MCNC: 0.82 MG/DL — SIGNIFICANT CHANGE UP (ref 0.5–1.3)
EGFR: 93 ML/MIN/1.73M2 — SIGNIFICANT CHANGE UP
EOSINOPHIL # BLD AUTO: 0.3 K/UL — SIGNIFICANT CHANGE UP (ref 0–0.5)
EOSINOPHIL NFR BLD AUTO: 3 % — SIGNIFICANT CHANGE UP (ref 0–6)
GLUCOSE SERPL-MCNC: 91 MG/DL — SIGNIFICANT CHANGE UP (ref 70–99)
HCG SERPL-ACNC: <1 MIU/ML — SIGNIFICANT CHANGE UP
HCT VFR BLD CALC: 36.6 % — SIGNIFICANT CHANGE UP (ref 34.5–45)
HGB BLD-MCNC: 11.4 G/DL — LOW (ref 11.5–15.5)
IMM GRANULOCYTES NFR BLD AUTO: 0.3 % — SIGNIFICANT CHANGE UP (ref 0–0.9)
LYMPHOCYTES # BLD AUTO: 1.67 K/UL — SIGNIFICANT CHANGE UP (ref 1–3.3)
LYMPHOCYTES # BLD AUTO: 16.6 % — SIGNIFICANT CHANGE UP (ref 13–44)
MCHC RBC-ENTMCNC: 25.1 PG — LOW (ref 27–34)
MCHC RBC-ENTMCNC: 31.1 GM/DL — LOW (ref 32–36)
MCV RBC AUTO: 80.4 FL — SIGNIFICANT CHANGE UP (ref 80–100)
MONOCYTES # BLD AUTO: 0.74 K/UL — SIGNIFICANT CHANGE UP (ref 0–0.9)
MONOCYTES NFR BLD AUTO: 7.4 % — SIGNIFICANT CHANGE UP (ref 2–14)
NEUTROPHILS # BLD AUTO: 7.25 K/UL — SIGNIFICANT CHANGE UP (ref 1.8–7.4)
NEUTROPHILS NFR BLD AUTO: 72.2 % — SIGNIFICANT CHANGE UP (ref 43–77)
NRBC # BLD: 0 /100 WBCS — SIGNIFICANT CHANGE UP (ref 0–0)
PLATELET # BLD AUTO: 347 K/UL — SIGNIFICANT CHANGE UP (ref 150–400)
POTASSIUM SERPL-MCNC: 4 MMOL/L — SIGNIFICANT CHANGE UP (ref 3.5–5.3)
POTASSIUM SERPL-SCNC: 4 MMOL/L — SIGNIFICANT CHANGE UP (ref 3.5–5.3)
PROT SERPL-MCNC: 7.2 G/DL — SIGNIFICANT CHANGE UP (ref 6–8.3)
RBC # BLD: 4.55 M/UL — SIGNIFICANT CHANGE UP (ref 3.8–5.2)
RBC # FLD: 15 % — HIGH (ref 10.3–14.5)
SODIUM SERPL-SCNC: 140 MMOL/L — SIGNIFICANT CHANGE UP (ref 135–145)
WBC # BLD: 10.04 K/UL — SIGNIFICANT CHANGE UP (ref 3.8–10.5)
WBC # FLD AUTO: 10.04 K/UL — SIGNIFICANT CHANGE UP (ref 3.8–10.5)

## 2024-07-31 PROCEDURE — 76642 ULTRASOUND BREAST LIMITED: CPT | Mod: 26,LT

## 2024-07-31 PROCEDURE — 99285 EMERGENCY DEPT VISIT HI MDM: CPT

## 2024-07-31 RX ORDER — CLINDAMYCIN PHOSPHATE 150 MG/ML
900 VIAL (ML) INJECTION ONCE
Refills: 0 | Status: COMPLETED | OUTPATIENT
Start: 2024-07-31 | End: 2024-07-31

## 2024-07-31 RX ADMIN — Medication 100 MILLIGRAM(S): at 23:10

## 2024-07-31 NOTE — ED PROVIDER NOTE - PHYSICAL EXAMINATION
Afebrile, hemodynamically stable, saturating well on room air  NAD, well appearing, sitting comfortably in chair, no WOB, speaking full sentences  Head NCAT, no noted rash or bruising  EOMI grossly, anicteric, no conjunctival injection  MMM  RRR  Breathing comfortably on room air  AAO, CN's 3-12 grossly intact  CHANG spontaneously  Skin warm, well perfused, diffuse erythema to back, no blistering  Breast (PCA Budhru as chaperone): L breast lumpiness/induration, no fluctuance, no overlying discoloration, no nipple discharge Afebrile, hemodynamically stable, saturating well on room air  NAD, well appearing, sitting comfortably in chair, no WOB, speaking full sentences  Head NCAT, no noted rash or bruising  EOMI grossly, anicteric, no conjunctival injection  MMM  RRR  Breathing comfortably on room air  AAO, CN's 3-12 grossly intact  CHANG spontaneously  Skin warm, well perfused, diffuse erythema to back, no blistering  Breast (PCA Budhru as chaperone): L breast lumpiness/induration, overlying erythema, no fluctuance, no nipple discharge
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
## 2024-07-31 NOTE — ED ADULT NURSE NOTE - ED STAT RN HANDOFF DETAILS
Patient A&Ox4 admitted to left breast abscess incision and drainage. Vital signs stable as documented, IV intact, no redness or swelling noted. Endorsed to Marni CRUZ. Pending transfer to [address] [place] [address] [address]

## 2024-07-31 NOTE — ED ADULT NURSE NOTE - NSFALLUNIVINTERV_ED_ALL_ED
Bed/Stretcher in lowest position, wheels locked, appropriate side rails in place/Call bell, personal items and telephone in reach/Instruct patient to call for assistance before getting out of bed/chair/stretcher/Non-slip footwear applied when patient is off stretcher/[place] to call system/Physically safe environment - no spills, clutter or unnecessary equipment/Purposeful proactive rounding/Room/bathroom lighting operational, light cord in reach
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
## 2024-08-01 ENCOUNTER — APPOINTMENT (OUTPATIENT)
Dept: SURGERY | Facility: CLINIC | Age: 39
End: 2024-08-01
Payer: MEDICAID

## 2024-08-01 ENCOUNTER — TRANSCRIPTION ENCOUNTER (OUTPATIENT)
Age: 39
End: 2024-08-01

## 2024-08-01 VITALS
HEART RATE: 76 BPM | OXYGEN SATURATION: 96 % | DIASTOLIC BLOOD PRESSURE: 74 MMHG | SYSTOLIC BLOOD PRESSURE: 106 MMHG | RESPIRATION RATE: 17 BRPM | TEMPERATURE: 98 F

## 2024-08-01 VITALS
DIASTOLIC BLOOD PRESSURE: 67 MMHG | HEART RATE: 74 BPM | HEIGHT: 64 IN | WEIGHT: 225 LBS | SYSTOLIC BLOOD PRESSURE: 115 MMHG | BODY MASS INDEX: 38.41 KG/M2

## 2024-08-01 DIAGNOSIS — R59.9 ENLARGED LYMPH NODES, UNSPECIFIED: ICD-10-CM

## 2024-08-01 DIAGNOSIS — N61.0 MASTITIS WITHOUT ABSCESS: ICD-10-CM

## 2024-08-01 DIAGNOSIS — N63.20 UNSPECIFIED LUMP IN THE LEFT BREAST, UNSPECIFIED QUADRANT: ICD-10-CM

## 2024-08-01 PROBLEM — Z00.00 ENCOUNTER FOR PREVENTIVE HEALTH EXAMINATION: Status: ACTIVE | Noted: 2024-08-01

## 2024-08-01 PROCEDURE — 80053 COMPREHEN METABOLIC PANEL: CPT

## 2024-08-01 PROCEDURE — 84702 CHORIONIC GONADOTROPIN TEST: CPT

## 2024-08-01 PROCEDURE — 76642 ULTRASOUND BREAST LIMITED: CPT

## 2024-08-01 PROCEDURE — 36415 COLL VENOUS BLD VENIPUNCTURE: CPT

## 2024-08-01 PROCEDURE — 85025 COMPLETE CBC W/AUTO DIFF WBC: CPT

## 2024-08-01 PROCEDURE — 99203 OFFICE O/P NEW LOW 30 MIN: CPT

## 2024-08-01 PROCEDURE — 99285 EMERGENCY DEPT VISIT HI MDM: CPT

## 2024-08-01 RX ORDER — HEPARIN SODIUM 1000 [USP'U]/ML
5000 INJECTION, SOLUTION INTRAVENOUS; SUBCUTANEOUS EVERY 8 HOURS
Refills: 0 | Status: DISCONTINUED | OUTPATIENT
Start: 2024-08-01 | End: 2024-08-01

## 2024-08-01 RX ORDER — DEXTROSE MONOHYDRATE, SODIUM CHLORIDE, SODIUM LACTATE, CALCIUM CHLORIDE, MAGNESIUM CHLORIDE 1.5; 538; 448; 18.4; 5.08 G/100ML; MG/100ML; MG/100ML; MG/100ML; MG/100ML
1000 SOLUTION INTRAPERITONEAL
Refills: 0 | Status: DISCONTINUED | OUTPATIENT
Start: 2024-08-01 | End: 2024-08-01

## 2024-08-01 NOTE — PLAN
[FreeTextEntry1] : BREAST US and Mammogram Ordered-STAT US guided core biopsy L breast Mass--stat US Biopsy of L axillary Lymph Node --- stat   Pt will having imaging done @ [facility]  Follow up after the biopsy/imaging  Patient's questions and concerns addressed to their satisfaction, and patient verbalized an understanding of the information discussed.

## 2024-08-01 NOTE — DISCHARGE NOTE PROVIDER - NSDCMRMEDTOKEN_GEN_ALL_CORE_FT
amoxicillin-clavulanate 875 mg-125 mg oral tablet: 875 milligram(s) orally every 12 hours  guaiFENesin 100 mg/5 mL oral liquid: 10 milliliter(s) orally every 6 hours, As Needed -for cough   ibuprofen 400 mg oral tablet: 1 tab(s) orally every 6 hours, As Needed- for moderate pain   Tessalon Perles 100 mg oral capsule: 1 cap(s) orally 3 times a day, As Needed -for cough   ZyrTEC 10 mg oral tablet: 1 tab(s) orally once a day, As Needed - for congestion or itching
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
## 2024-08-01 NOTE — DISCHARGE NOTE PROVIDER - CARE PROVIDER_API CALL
Mack Major.  Surgery  [address]  [place] [address]  Phone: [phone]  Fax: [phone]  Follow Up Time:

## 2024-08-01 NOTE — HISTORY OF PRESENT ILLNESS
[de-identified] : Ms. SHERMAN is a 38 year y/o F who was seen in [facility], with enlargement and pain /swelling to the L breast and enlarged left axillary lymph node.  Pt had recently been BF her child, and stopped 4 months ago. She has had swelling since then and also admits to taking PO Amoxicillin. Pt states she had been using a breast pump, which did not help and made the swelling worse.   Pt states she had gone to [facility] and had some workup done for the breast there.

## 2024-08-01 NOTE — DISCHARGE NOTE NURSING/CASE MANAGEMENT/SOCIAL WORK - PATIENT PORTAL LINK FT
You can access the FollowMyHealth Patient Portal offered by [facility] by registering at the following website: [email or web address]. By joining [facility]’s FollowMyHealth portal, you will also be able to view your health information using other applications (apps) compatible with our system.

## 2024-08-01 NOTE — PATIENT PROFILE ADULT - FUNCTIONAL ASSESSMENT - DAILY ACTIVITY 6.
4 = No assist / stand by assistance Abdomen soft, nontender, nondistended, bowel sounds present in all 4 quadrants.

## 2024-08-01 NOTE — DISCHARGE NOTE PROVIDER - NSDCCPCAREPLAN_GEN_ALL_CORE_FT
PRINCIPAL DISCHARGE DIAGNOSIS  Diagnosis: Cellulitis and abscess of breast  Assessment and Plan of Treatment: Follow up with breast surgeon Dr. Major TODAY 8/1/24  DIET  You may resume your regular diet as normal.   PAIN CONTROL  You may take Motrin 600mg (with food) or Tylenol 650mg as needed for mild pain. Stagger one medication 3 hours after the other for maximum pain control. Maximum daily dose of Tylenol should not exceed 4grams/day.      SECONDARY DISCHARGE DIAGNOSES  Diagnosis: Breast swelling  Assessment and Plan of Treatment:     Diagnosis: Burn  Assessment and Plan of Treatment:
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
## 2024-08-01 NOTE — ED ADULT NURSE REASSESSMENT NOTE - NS ED NURSE REASSESS COMMENT FT1
1x Attempt to give Report [facility] [address] @[id number]  2x Attempt to give Report [facility] [address] @[id number]

## 2024-08-01 NOTE — PLAN
[FreeTextEntry1] : BREAST US and Mammogram Ordered-STAT US guided core biopsy L breast Mass--stat US Biopsy of L axillary Lymph Node --- stat   Pt will having imaging done @ [facility]  Follow up after the biopsy/imaging  Patient's questions and concerns addressed to their satisfaction, and patient verbalized an understanding of the information discussed.

## 2024-08-01 NOTE — PHYSICAL EXAM
[No Rash or Lesion] : No rash or lesion [Alert] : alert [Oriented to Person] : oriented to person [Oriented to Place] : oriented to place [Oriented to Time] : oriented to time [Calm] : calm [de-identified] : A/Ox3; NAD. appears comfortable [de-identified] : EOMI; sclera anicteric. [de-identified] : airway patent, no use of accessory muscles [de-identified] : diffuse mass to the left outer breast w swelling /enlargement and palpable firm enlarged left axillary lymph nodes  [de-identified] : abd is soft, NT/ND

## 2024-08-01 NOTE — PHYSICAL EXAM
[No Rash or Lesion] : No rash or lesion [Alert] : alert [Oriented to Person] : oriented to person [Oriented to Place] : oriented to place [Oriented to Time] : oriented to time [Calm] : calm [de-identified] : A/Ox3; NAD. appears comfortable [de-identified] : EOMI; sclera anicteric. [de-identified] : airway patent, no use of accessory muscles [de-identified] : diffuse mass to the left outer breast w swelling /enlargement and palpable firm enlarged left axillary lymph nodes  [de-identified] : abd is soft, NT/ND
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
## 2024-08-01 NOTE — HISTORY OF PRESENT ILLNESS
[de-identified] : Ms. SHERMAN is a 38 year y/o F who was seen in [facility], with enlargement and pain /swelling to the L breast and enlarged left axillary lymph node.  Pt had recently been BF her child, and stopped 4 months ago. She has had swelling since then and also admits to taking PO Amoxicillin. Pt states she had been using a breast pump, which did not help and made the swelling worse.   Pt states she had gone to [facility] and had some workup done for the breast there.

## 2024-08-01 NOTE — ASSESSMENT
[FreeTextEntry1] : IMP: 39 yo F w diffuse enlargement/mass to the L breast over the past 4-5 months w enlarged left axillary lymph nodes.   As per patient, she had gone to [facility] and saw a breast surgeon 2 months ago and had some testing done there, stating it was benign.

## 2024-08-01 NOTE — PROGRESS NOTE ADULT - ASSESSMENT
38-year-old female with left breast swelling     PLAN   - f/u AM labs   - Possible I&D vs biopsy pending discussion with surgeon   - NPO/IVF

## 2024-08-01 NOTE — ASSESSMENT
[FreeTextEntry1] : IMP: 39 yo F w diffuse enlargement/mass to the L breast over the past 4-5 months w enlarged left axillary lymph nodes.   As per patient, she had gone to [facility] and saw a breast surgeon 2 months ago and had some testing done there, stating it was benign.

## 2024-08-01 NOTE — H&P ADULT - NSHPPHYSICALEXAM_GEN_ALL_CORE
T(C): 36.7 (08-01-24 @ 04:55), Max: 36.8 (07-31-24 @ 16:39)  HR: 80 (08-01-24 @ 04:55) (80 - 94)  BP: 98/63 (08-01-24 @ 04:55) (98/63 - 120/78)  RR: 16 (08-01-24 @ 04:55) (16 - 18)  SpO2: 95% (08-01-24 @ 04:55) (95% - 98%)    CONSTITUTIONAL: Well groomed, no apparent distress  EYES: PERRLA and symmetric, EOMI, No conjunctival or scleral injection, non-icteric  NECK: Supple, symmetric and without tracheal deviation   RESP: No respiratory distress, no use of accessory muscles; CTA b/l, no WRR  CV: RRR, +S1S2, no MRG  GI: Soft, NT, ND, no rebound, no guarding; no palpable masses  MSK: Normal gait; No digital clubbing or cyanosis  SKIN: No rashes or ulcers noted; no subcutaneous nodules or induration palpable  PSYCH: Appropriate insight/judgment; A+O x 3, mood and affect appropriate, recent/remote memory intact  Breast (chaperoned): L breast firmness/induration/swelling along whole underside extending toward L axilla, overlying erythema, no fluctuance, no nipple discharge

## 2024-08-06 ENCOUNTER — APPOINTMENT (OUTPATIENT)
Dept: MAMMOGRAPHY | Facility: CLINIC | Age: 39
End: 2024-08-06

## 2024-08-09 ENCOUNTER — APPOINTMENT (OUTPATIENT)
Dept: MAMMOGRAPHY | Facility: CLINIC | Age: 39
End: 2024-08-09

## 2024-10-25 ENCOUNTER — NON-APPOINTMENT (OUTPATIENT)
Age: 39
End: 2024-10-25

## 2024-10-29 ENCOUNTER — NON-APPOINTMENT (OUTPATIENT)
Age: 39
End: 2024-10-29

## 2024-10-29 DIAGNOSIS — R59.9 ENLARGED LYMPH NODES, UNSPECIFIED: ICD-10-CM

## 2024-11-08 ENCOUNTER — NON-APPOINTMENT (OUTPATIENT)
Age: 39
End: 2024-11-08

## 2024-11-09 PROBLEM — C50.919 INVASIVE DUCTAL CARCINOMA OF BREAST: Status: ACTIVE | Noted: 2024-11-09

## 2024-11-11 ENCOUNTER — NON-APPOINTMENT (OUTPATIENT)
Age: 39
End: 2024-11-11

## 2024-11-11 ENCOUNTER — APPOINTMENT (OUTPATIENT)
Dept: SURGERY | Facility: CLINIC | Age: 39
End: 2024-11-11
Payer: MEDICAID

## 2024-11-11 VITALS
OXYGEN SATURATION: 96 % | SYSTOLIC BLOOD PRESSURE: 136 MMHG | DIASTOLIC BLOOD PRESSURE: 81 MMHG | HEIGHT: 64 IN | HEART RATE: 101 BPM

## 2024-11-11 DIAGNOSIS — C50.919 MALIGNANT NEOPLASM OF UNSPECIFIED SITE OF UNSPECIFIED FEMALE BREAST: ICD-10-CM

## 2024-11-11 PROCEDURE — 99215 OFFICE O/P EST HI 40 MIN: CPT

## 2024-11-18 ENCOUNTER — APPOINTMENT (OUTPATIENT)
Dept: SURGERY | Facility: CLINIC | Age: 39
End: 2024-11-18
Payer: MEDICAID

## 2024-11-18 PROCEDURE — 99214 OFFICE O/P EST MOD 30 MIN: CPT

## 2024-12-05 NOTE — PROGRESS NOTE ADULT - PROBLEM SELECTOR PROBLEM 1
Breast inflammation
General Sunscreen Counseling: photoprotection and sunscreen
Detail Level: Detailed

## 2024-12-12 ENCOUNTER — EMERGENCY (EMERGENCY)
Facility: HOSPITAL | Age: 39
LOS: 1 days | Discharge: ANOTHER HOSPITAL | End: 2024-12-12
Attending: STUDENT IN AN ORGANIZED HEALTH CARE EDUCATION/TRAINING PROGRAM
Payer: MEDICAID

## 2024-12-12 ENCOUNTER — INPATIENT (INPATIENT)
Facility: HOSPITAL | Age: 39
LOS: 5 days | Discharge: HOME | DRG: 314 | End: 2024-12-18
Attending: STUDENT IN AN ORGANIZED HEALTH CARE EDUCATION/TRAINING PROGRAM | Admitting: STUDENT IN AN ORGANIZED HEALTH CARE EDUCATION/TRAINING PROGRAM
Payer: MEDICAID

## 2024-12-12 ENCOUNTER — RESULT REVIEW (OUTPATIENT)
Age: 39
End: 2024-12-12

## 2024-12-12 VITALS
SYSTOLIC BLOOD PRESSURE: 97 MMHG | OXYGEN SATURATION: 98 % | DIASTOLIC BLOOD PRESSURE: 58 MMHG | TEMPERATURE: 99 F | HEART RATE: 86 BPM | RESPIRATION RATE: 18 BRPM

## 2024-12-12 VITALS
WEIGHT: 199.96 LBS | TEMPERATURE: 99 F | HEART RATE: 97 BPM | OXYGEN SATURATION: 94 % | SYSTOLIC BLOOD PRESSURE: 86 MMHG | RESPIRATION RATE: 16 BRPM | DIASTOLIC BLOOD PRESSURE: 51 MMHG

## 2024-12-12 VITALS
HEIGHT: 67 IN | DIASTOLIC BLOOD PRESSURE: 66 MMHG | WEIGHT: 199.96 LBS | TEMPERATURE: 98 F | HEART RATE: 102 BPM | SYSTOLIC BLOOD PRESSURE: 97 MMHG | RESPIRATION RATE: 20 BRPM | OXYGEN SATURATION: 96 %

## 2024-12-12 LAB
ALBUMIN SERPL ELPH-MCNC: 2.2 G/DL — LOW (ref 3.5–5)
ALBUMIN SERPL ELPH-MCNC: 3 G/DL — LOW (ref 3.3–5)
ALP SERPL-CCNC: 65 U/L — SIGNIFICANT CHANGE UP (ref 40–120)
ALP SERPL-CCNC: 69 U/L — SIGNIFICANT CHANGE UP (ref 40–120)
ALT FLD-CCNC: 17 U/L — SIGNIFICANT CHANGE UP (ref 10–45)
ALT FLD-CCNC: 21 U/L DA — SIGNIFICANT CHANGE UP (ref 10–60)
ANION GAP SERPL CALC-SCNC: 15 MMOL/L — SIGNIFICANT CHANGE UP (ref 5–17)
ANION GAP SERPL CALC-SCNC: 7 MMOL/L — SIGNIFICANT CHANGE UP (ref 5–17)
ANISOCYTOSIS BLD QL: SLIGHT — SIGNIFICANT CHANGE UP
APPEARANCE UR: CLEAR — SIGNIFICANT CHANGE UP
APTT BLD: 68.2 SEC — HIGH (ref 24.5–35.6)
AST SERPL-CCNC: 19 U/L — SIGNIFICANT CHANGE UP (ref 10–40)
AST SERPL-CCNC: 21 U/L — SIGNIFICANT CHANGE UP (ref 10–40)
BACTERIA # UR AUTO: NEGATIVE /HPF — SIGNIFICANT CHANGE UP
BASOPHILS # BLD AUTO: 0 K/UL — SIGNIFICANT CHANGE UP (ref 0–0.2)
BASOPHILS # BLD AUTO: 0.06 K/UL — SIGNIFICANT CHANGE UP (ref 0–0.2)
BASOPHILS NFR BLD AUTO: 0 % — SIGNIFICANT CHANGE UP (ref 0–2)
BASOPHILS NFR BLD AUTO: 0.5 % — SIGNIFICANT CHANGE UP (ref 0–2)
BILIRUB SERPL-MCNC: 0.4 MG/DL — SIGNIFICANT CHANGE UP (ref 0.2–1.2)
BILIRUB SERPL-MCNC: 0.6 MG/DL — SIGNIFICANT CHANGE UP (ref 0.2–1.2)
BILIRUB UR-MCNC: NEGATIVE — SIGNIFICANT CHANGE UP
BUN SERPL-MCNC: 11 MG/DL — SIGNIFICANT CHANGE UP (ref 7–23)
BUN SERPL-MCNC: 15 MG/DL — SIGNIFICANT CHANGE UP (ref 7–18)
BURR CELLS BLD QL SMEAR: PRESENT — SIGNIFICANT CHANGE UP
CALCIUM SERPL-MCNC: 8.7 MG/DL — SIGNIFICANT CHANGE UP (ref 8.4–10.5)
CALCIUM SERPL-MCNC: 8.7 MG/DL — SIGNIFICANT CHANGE UP (ref 8.4–10.5)
CHLORIDE SERPL-SCNC: 102 MMOL/L — SIGNIFICANT CHANGE UP (ref 96–108)
CHLORIDE SERPL-SCNC: 96 MMOL/L — SIGNIFICANT CHANGE UP (ref 96–108)
CO2 SERPL-SCNC: 21 MMOL/L — LOW (ref 22–31)
CO2 SERPL-SCNC: 24 MMOL/L — SIGNIFICANT CHANGE UP (ref 22–31)
COLOR SPEC: SIGNIFICANT CHANGE UP
CREAT SERPL-MCNC: 0.55 MG/DL — SIGNIFICANT CHANGE UP (ref 0.5–1.3)
CREAT SERPL-MCNC: 0.68 MG/DL — SIGNIFICANT CHANGE UP (ref 0.5–1.3)
DIFF PNL FLD: ABNORMAL
EGFR: 114 ML/MIN/1.73M2 — SIGNIFICANT CHANGE UP
EGFR: 114 ML/MIN/1.73M2 — SIGNIFICANT CHANGE UP
EGFR: 120 ML/MIN/1.73M2 — SIGNIFICANT CHANGE UP
EOSINOPHIL # BLD AUTO: 0 K/UL — SIGNIFICANT CHANGE UP (ref 0–0.5)
EOSINOPHIL # BLD AUTO: 0.07 K/UL — SIGNIFICANT CHANGE UP (ref 0–0.5)
EOSINOPHIL NFR BLD AUTO: 0 % — SIGNIFICANT CHANGE UP (ref 0–6)
EOSINOPHIL NFR BLD AUTO: 0.6 % — SIGNIFICANT CHANGE UP (ref 0–6)
EPI CELLS # UR: PRESENT
GAS PNL BLDV: SIGNIFICANT CHANGE UP
GLUCOSE SERPL-MCNC: 134 MG/DL — HIGH (ref 70–99)
GLUCOSE SERPL-MCNC: 162 MG/DL — HIGH (ref 70–99)
GLUCOSE UR QL: NEGATIVE MG/DL — SIGNIFICANT CHANGE UP
HCG SERPL-ACNC: <1 MIU/ML — SIGNIFICANT CHANGE UP
HCG SERPL-ACNC: <2 MIU/ML — SIGNIFICANT CHANGE UP
HCT VFR BLD CALC: 29.9 % — LOW (ref 34.5–45)
HCT VFR BLD CALC: 31.7 % — LOW (ref 34.5–45)
HGB BLD-MCNC: 9.1 G/DL — LOW (ref 11.5–15.5)
HGB BLD-MCNC: 9.8 G/DL — LOW (ref 11.5–15.5)
IMM GRANULOCYTES NFR BLD AUTO: 0.5 % — SIGNIFICANT CHANGE UP (ref 0–0.9)
INR BLD: 1.22 RATIO — HIGH (ref 0.85–1.16)
KETONES UR-MCNC: NEGATIVE MG/DL — SIGNIFICANT CHANGE UP
LEUKOCYTE ESTERASE UR-ACNC: NEGATIVE — SIGNIFICANT CHANGE UP
LYMPHOCYTES # BLD AUTO: 0.72 K/UL — LOW (ref 1–3.3)
LYMPHOCYTES # BLD AUTO: 1.6 K/UL — SIGNIFICANT CHANGE UP (ref 1–3.3)
LYMPHOCYTES # BLD AUTO: 13.6 % — SIGNIFICANT CHANGE UP (ref 13–44)
LYMPHOCYTES # BLD AUTO: 7.9 % — LOW (ref 13–44)
MAGNESIUM SERPL-MCNC: 1.8 MG/DL — SIGNIFICANT CHANGE UP (ref 1.6–2.6)
MAGNESIUM SERPL-MCNC: 1.8 MG/DL — SIGNIFICANT CHANGE UP (ref 1.6–2.6)
MANUAL SMEAR VERIFICATION: SIGNIFICANT CHANGE UP
MCHC RBC-ENTMCNC: 22.7 PG — LOW (ref 27–34)
MCHC RBC-ENTMCNC: 23.2 PG — LOW (ref 27–34)
MCHC RBC-ENTMCNC: 30.4 G/DL — LOW (ref 32–36)
MCHC RBC-ENTMCNC: 30.9 G/DL — LOW (ref 32–36)
MCV RBC AUTO: 74.6 FL — LOW (ref 80–100)
MCV RBC AUTO: 74.9 FL — LOW (ref 80–100)
MICROCYTES BLD QL: SLIGHT — SIGNIFICANT CHANGE UP
MONOCYTES # BLD AUTO: 0.32 K/UL — SIGNIFICANT CHANGE UP (ref 0–0.9)
MONOCYTES # BLD AUTO: 1.14 K/UL — HIGH (ref 0–0.9)
MONOCYTES NFR BLD AUTO: 3.5 % — SIGNIFICANT CHANGE UP (ref 2–14)
MONOCYTES NFR BLD AUTO: 9.7 % — SIGNIFICANT CHANGE UP (ref 2–14)
NEUTROPHILS # BLD AUTO: 8.03 K/UL — HIGH (ref 1.8–7.4)
NEUTROPHILS # BLD AUTO: 8.83 K/UL — HIGH (ref 1.8–7.4)
NEUTROPHILS NFR BLD AUTO: 75.1 % — SIGNIFICANT CHANGE UP (ref 43–77)
NEUTROPHILS NFR BLD AUTO: 87.7 % — HIGH (ref 43–77)
NEUTS BAND # BLD: 0.9 % — SIGNIFICANT CHANGE UP (ref 0–8)
NITRITE UR-MCNC: NEGATIVE — SIGNIFICANT CHANGE UP
NRBC # BLD: 0 /100 WBCS — SIGNIFICANT CHANGE UP (ref 0–0)
NRBC BLD-RTO: 0 /100 WBCS — SIGNIFICANT CHANGE UP (ref 0–0)
NT-PROBNP SERPL-SCNC: 88 PG/ML — SIGNIFICANT CHANGE UP (ref 0–300)
NT-PROBNP SERPL-SCNC: 92 PG/ML — SIGNIFICANT CHANGE UP (ref 0–125)
OVALOCYTES BLD QL SMEAR: SLIGHT — SIGNIFICANT CHANGE UP
PH UR: 5.5 — SIGNIFICANT CHANGE UP (ref 5–8)
PHOSPHATE SERPL-MCNC: 3.7 MG/DL — SIGNIFICANT CHANGE UP (ref 2.5–4.5)
PLAT MORPH BLD: NORMAL — SIGNIFICANT CHANGE UP
PLATELET # BLD AUTO: 275 K/UL — SIGNIFICANT CHANGE UP (ref 150–400)
PLATELET # BLD AUTO: 286 K/UL — SIGNIFICANT CHANGE UP (ref 150–400)
POIKILOCYTOSIS BLD QL AUTO: SLIGHT — SIGNIFICANT CHANGE UP
POTASSIUM SERPL-MCNC: 4 MMOL/L — SIGNIFICANT CHANGE UP (ref 3.5–5.3)
POTASSIUM SERPL-MCNC: 4.1 MMOL/L — SIGNIFICANT CHANGE UP (ref 3.5–5.3)
POTASSIUM SERPL-SCNC: 4 MMOL/L — SIGNIFICANT CHANGE UP (ref 3.5–5.3)
POTASSIUM SERPL-SCNC: 4.1 MMOL/L — SIGNIFICANT CHANGE UP (ref 3.5–5.3)
PROT SERPL-MCNC: 6.1 G/DL — SIGNIFICANT CHANGE UP (ref 6–8.3)
PROT SERPL-MCNC: 6.5 G/DL — SIGNIFICANT CHANGE UP (ref 6–8.3)
PROT UR-MCNC: 30 MG/DL
PROTHROM AB SERPL-ACNC: 13.9 SEC — HIGH (ref 9.9–13.4)
RBC # BLD: 4.01 M/UL — SIGNIFICANT CHANGE UP (ref 3.8–5.2)
RBC # BLD: 4.23 M/UL — SIGNIFICANT CHANGE UP (ref 3.8–5.2)
RBC # FLD: 15.8 % — HIGH (ref 10.3–14.5)
RBC # FLD: 15.9 % — HIGH (ref 10.3–14.5)
RBC BLD AUTO: ABNORMAL
RBC CASTS # UR COMP ASSIST: 2 /HPF — SIGNIFICANT CHANGE UP (ref 0–4)
SODIUM SERPL-SCNC: 132 MMOL/L — LOW (ref 135–145)
SODIUM SERPL-SCNC: 133 MMOL/L — LOW (ref 135–145)
SP GR SPEC: 1.03 — HIGH (ref 1–1.03)
TROPONIN I, HIGH SENSITIVITY RESULT: 3.8 NG/L — SIGNIFICANT CHANGE UP
TROPONIN T, HIGH SENSITIVITY RESULT: <6 NG/L — SIGNIFICANT CHANGE UP (ref 0–51)
UROBILINOGEN FLD QL: 1 MG/DL — SIGNIFICANT CHANGE UP (ref 0.2–1)
WBC # BLD: 11.76 K/UL — HIGH (ref 3.8–10.5)
WBC # BLD: 9.06 K/UL — SIGNIFICANT CHANGE UP (ref 3.8–10.5)
WBC # FLD AUTO: 11.76 K/UL — HIGH (ref 3.8–10.5)
WBC # FLD AUTO: 9.06 K/UL — SIGNIFICANT CHANGE UP (ref 3.8–10.5)
WBC UR QL: 2 /HPF — SIGNIFICANT CHANGE UP (ref 0–5)

## 2024-12-12 PROCEDURE — 71275 CT ANGIOGRAPHY CHEST: CPT | Mod: MC

## 2024-12-12 PROCEDURE — 36415 COLL VENOUS BLD VENIPUNCTURE: CPT

## 2024-12-12 PROCEDURE — 99291 CRITICAL CARE FIRST HOUR: CPT | Mod: 25

## 2024-12-12 PROCEDURE — 93010 ELECTROCARDIOGRAM REPORT: CPT

## 2024-12-12 PROCEDURE — 80053 COMPREHEN METABOLIC PANEL: CPT

## 2024-12-12 PROCEDURE — 99291 CRITICAL CARE FIRST HOUR: CPT

## 2024-12-12 PROCEDURE — 71275 CT ANGIOGRAPHY CHEST: CPT | Mod: 26,MC

## 2024-12-12 PROCEDURE — 96375 TX/PRO/DX INJ NEW DRUG ADDON: CPT | Mod: XU

## 2024-12-12 PROCEDURE — 83735 ASSAY OF MAGNESIUM: CPT

## 2024-12-12 PROCEDURE — 96374 THER/PROPH/DIAG INJ IV PUSH: CPT | Mod: XU

## 2024-12-12 PROCEDURE — 74177 CT ABD & PELVIS W/CONTRAST: CPT | Mod: MC

## 2024-12-12 PROCEDURE — 70450 CT HEAD/BRAIN W/O DYE: CPT | Mod: 26,MC

## 2024-12-12 PROCEDURE — 83880 ASSAY OF NATRIURETIC PEPTIDE: CPT

## 2024-12-12 PROCEDURE — 96376 TX/PRO/DX INJ SAME DRUG ADON: CPT | Mod: XU

## 2024-12-12 PROCEDURE — 87086 URINE CULTURE/COLONY COUNT: CPT

## 2024-12-12 PROCEDURE — 74177 CT ABD & PELVIS W/CONTRAST: CPT | Mod: 26,MC

## 2024-12-12 PROCEDURE — 93005 ELECTROCARDIOGRAM TRACING: CPT

## 2024-12-12 PROCEDURE — 85025 COMPLETE CBC W/AUTO DIFF WBC: CPT

## 2024-12-12 PROCEDURE — 81001 URINALYSIS AUTO W/SCOPE: CPT

## 2024-12-12 PROCEDURE — 84702 CHORIONIC GONADOTROPIN TEST: CPT

## 2024-12-12 PROCEDURE — 84484 ASSAY OF TROPONIN QUANT: CPT

## 2024-12-12 RX ORDER — AZITHROMYCIN 250 MG
500 CAPSULE ORAL ONCE
Refills: 0 | Status: COMPLETED | OUTPATIENT
Start: 2024-12-12 | End: 2024-12-12

## 2024-12-12 RX ORDER — LIDOCAINE HYDROCHLORIDE 20 MG/ML
1 JELLY TOPICAL ONCE
Refills: 0 | Status: COMPLETED | OUTPATIENT
Start: 2024-12-12 | End: 2024-12-12

## 2024-12-12 RX ORDER — HEPARIN SODIUM,PORCINE 1000/ML
4000 VIAL (ML) INJECTION EVERY 6 HOURS
Refills: 0 | Status: DISCONTINUED | OUTPATIENT
Start: 2024-12-12 | End: 2024-12-15

## 2024-12-12 RX ORDER — HEPARIN SODIUM 1000 [USP'U]/ML
7500 INJECTION INTRAVENOUS; SUBCUTANEOUS ONCE
Refills: 0 | Status: COMPLETED | OUTPATIENT
Start: 2024-12-12 | End: 2024-12-12

## 2024-12-12 RX ORDER — HEPARIN SODIUM,PORCINE 1000/ML
VIAL (ML) INJECTION
Qty: 25000 | Refills: 0 | Status: DISCONTINUED | OUTPATIENT
Start: 2024-12-12 | End: 2024-12-14

## 2024-12-12 RX ORDER — HEPARIN SODIUM 1000 [USP'U]/ML
3500 INJECTION INTRAVENOUS; SUBCUTANEOUS EVERY 6 HOURS
Refills: 0 | Status: DISCONTINUED | OUTPATIENT
Start: 2024-12-12 | End: 2024-12-15

## 2024-12-12 RX ORDER — CEFTRIAXONE 500 MG/1
1000 INJECTION, POWDER, FOR SOLUTION INTRAMUSCULAR; INTRAVENOUS ONCE
Refills: 0 | Status: COMPLETED | OUTPATIENT
Start: 2024-12-12 | End: 2024-12-12

## 2024-12-12 RX ORDER — HEPARIN SODIUM 1000 [USP'U]/ML
7500 INJECTION INTRAVENOUS; SUBCUTANEOUS EVERY 6 HOURS
Refills: 0 | Status: DISCONTINUED | OUTPATIENT
Start: 2024-12-12 | End: 2024-12-15

## 2024-12-12 RX ORDER — ACETAMINOPHEN 500 MG/5ML
1000 LIQUID (ML) ORAL ONCE
Refills: 0 | Status: COMPLETED | OUTPATIENT
Start: 2024-12-12 | End: 2024-12-12

## 2024-12-12 RX ORDER — HEPARIN SODIUM 1000 [USP'U]/ML
INJECTION INTRAVENOUS; SUBCUTANEOUS
Qty: 25000 | Refills: 0 | Status: DISCONTINUED | OUTPATIENT
Start: 2024-12-12 | End: 2024-12-15

## 2024-12-12 RX ORDER — HEPARIN SODIUM,PORCINE 1000/ML
8000 VIAL (ML) INJECTION EVERY 6 HOURS
Refills: 0 | Status: DISCONTINUED | OUTPATIENT
Start: 2024-12-12 | End: 2024-12-15

## 2024-12-12 RX ADMIN — Medication 4 MILLIGRAM(S): at 15:11

## 2024-12-12 RX ADMIN — HEPARIN SODIUM 1700 UNIT(S)/HR: 1000 INJECTION INTRAVENOUS; SUBCUTANEOUS at 16:35

## 2024-12-12 RX ADMIN — Medication 1000 MILLILITER(S): at 13:06

## 2024-12-12 RX ADMIN — HEPARIN SODIUM 7500 UNIT(S): 1000 INJECTION INTRAVENOUS; SUBCUTANEOUS at 16:39

## 2024-12-12 RX ADMIN — Medication 1800 UNIT(S)/HR: at 18:52

## 2024-12-12 RX ADMIN — Medication 500 MILLIGRAM(S): at 16:47

## 2024-12-12 RX ADMIN — Medication 4 MILLIGRAM(S): at 13:23

## 2024-12-12 RX ADMIN — LIDOCAINE HYDROCHLORIDE 1 PATCH: 20 JELLY TOPICAL at 13:06

## 2024-12-12 NOTE — ED PROVIDER NOTE - OBJECTIVE STATEMENT
38Y F PMH L breast cancer recently dx and started on chemo, first run 12/5 transferred from [facility] after found to have acute PE. 38Y F PMH L breast cancer recently dx and started on chemo, first run 12/5 transferred from [facility] after found to have acute PE and concern for right atrial thrombus.  Patient reportedly went to ED at [facility] today due to right sided flank pain that started last night.  Attempted Tylenol without relief.  Found to have acute pulmonary embolism on right, as well as right atrial filling defect concerning for possible thrombus.  Started on heparin drip and transferred to [place] [facility] for PERT team. Never had CP or SOB; still denies at this time. Only c/o R flank pain. Also endorsing ongoing serosang drainage from L breast biopsy site; denies pain to this area. No fever.

## 2024-12-12 NOTE — CONSULT NOTE ADULT - ASSESSMENT
Ms. Bartholomew is a 37 yo F with recent diagnosis of breast cancer (told was curable with chemo and surgery, last chemo 5 days ago, has R port in place), presented from [facility] for bilateral PE.      #bilateral PE     started feeling R sided mid back and flank pain that started last night, pleuritic. no sob or syncope. hasn't noticed any extremity swelling or pain. has RA 2.9x1.2 cm filling defect suspicious for blood clot. has large left partially necrotic breast mass 15.9 cm with edema and skin thickening consistent with malignancy.    Pt arrived to [facility] ED on heparin gtt.  afebrile, p 102, BP 97/66, RR 20, SpO2 96% on RA.   vbg 7.35/44/lactate 1.5  *** probnp, ***trop  ***ekg  stat TTE and blle DVT US ordered  CT head ordered to rule out brain mets Ms. Bartholomew is a 37 yo F with recent diagnosis of breast cancer (told was curable with chemo and surgery, last chemo 5 days ago, has R port in place), presented from [facility] for bilateral PE, transferred to [facility] for further eval    -CTA Chest 12/12 at [facility]  Filling defect in first order right lower lobe segmental branch   extending into the subsegmental lower lobe branches. Second order   pulmonary embolism in the left lower lobe subsegmental branch.      #bilateral PE     started feeling R sided mid back and flank pain that started last night, pleuritic. no sob or syncope. hasn't noticed any extremity swelling or pain. has RA 2.9x1.2 cm filling defect suspicious for blood clot. has large left partially necrotic breast mass 15.9 cm with edema and skin thickening consistent with malignancy.    Pt arrived to [facility] ED on heparin gtt.  afebrile, p 102, BP 97/66, RR 20, SpO2 96% on RA.   vbg 7.35/44/lactate 1.5  *** probnp, ***trop  ***ekg  stat TTE and blle DVT US ordered  CT head ordered to rule out brain mets Ms. Bartholomew is a 37 yo F with recent diagnosis of breast cancer 11/7/2024 (told was curable with chemo and surgery, last chemo 5 days ago, has R port in place), presented from [facility] for bilateral PE, transferred to [facility] for further eval    CTA Chest 12/12 at [facility]  Filling defect in first order right lower lobe segmental branch   extending into the subsegmental lower lobe branches. Second order   pulmonary embolism in the left lower lobe subsegmental branch.    TTE 12/12/24   1. Technically difficulty study.   2. Left ventricular endocardium is not well visualized; however, the left ventricular systolic function appears grossly normal.   3. There is poor visualization of all the endocardial borders to determine the presence of wall motion abnormalities. Probably normal.   4. The right ventricle is not well visualized. Based on visual assessment, the right ventricle appears normal in size and right ventricular systolic function is normal.   5. Unable to exclude the presence of right atrium, superior or inferior vena cava mass.   6. No significant valvular disease within study limitations.   7. No prior echocardiogram is available for comparison.    #bilateral PE   #RA clot  Filling defect in first order right lower lobe segmental branch extending into the subsegmental lower lobe branches. Second order pulmonary embolism in the left lower lobe subsegmental branch. started feeling R sided mid back and flank pain that started last night, pleuritic. no sob or syncope. hasn't noticed any extremity swelling or pain. has RA 2.9x1.2 cm filling defect suspicious for blood clot. has large left partially necrotic breast mass 15.9 cm with edema and skin thickening consistent with malignancy. hemodynamically stable. pt is low risk PE. PE likely from malignancy and sedentary for the last week  -continue heparin gtt  -monitor on telemetry  -keep npo after midnight, for possible MIKEY in the AM and for possible vascular intervention  -DVT US blle  -CT head ordered to rule out brain mets  -call cardiology if becomes hypotensive, may need TPA at that point  -daily bmp for Mg>2, K>4

## 2024-12-12 NOTE — ED PROVIDER NOTE - OTHER FINDINGS
ECG recorded at 7:31 PM independently interpreted by me , Dr Vinnie Davis,  at 741 shows sinus tachycardia rate 100 normal axis normal intervals no acute diagnostic ischemic ST-T changes

## 2024-12-12 NOTE — ED PROVIDER NOTE - PROGRESS NOTE DETAILS
Yael Benavides MD PGY-3: surgery consulted for breast mass with active drainage, will follow inpatient due to concern for bleeding while on heparin gtt.

## 2024-12-12 NOTE — CONSULT NOTE ADULT - SUBJECTIVE AND OBJECTIVE BOX
[facility] Breast Surgery Consult Note:    HPI: 38yr F with past medical history of L breast mass recently diagnosed as poorly differentiated invasive ductal carcinoma (following with Dr. Mack Major), first round of chemotherapy completed on 12/5 who presented to [facility] with R flank/chest pain found to have bilateral lower lobe PEs and concern for R atrial clot. Patient started on heparin gtt and transferred to [facility] for PERT team evaluation and further management. Patient denies SOB, chest pain, dizziness or palpitations at this time. She endorses some L breast fullness and discomfort as well as drainage from the inferior aspect of her left breast at the previous biopsy site. In the ED patient tachycardic to the 110s, BP stable. Labs overall unremarkable. Breast surgery consulted due to concerns for future bleed on anticoagulation.    PAST MEDICAL & SURGICAL HISTORY:  Breast mass    Allergies  No Known Allergies    MEDICATIONS  (STANDING):  heparin  Infusion.  Unit(s)/Hr (18 mL/Hr) IV Continuous <Continuous>    MEDICATIONS  (PRN):  heparin   Injectable 8000 Unit(s) IV Push every 6 hours PRN For aPTT less than 40  heparin   Injectable 4000 Unit(s) IV Push every 6 hours PRN For aPTT between 40 - 57    Vital Signs Last 24 Hrs  T(C): 36.6 (12 Dec 2024 17:53), Max: 36.6 (12 Dec 2024 17:53)  T(F): 97.8 (12 Dec 2024 17:53), Max: 97.8 (12 Dec 2024 17:53)  HR: 102 (12 Dec 2024 17:53) (102 - 102)  BP: 97/66 (12 Dec 2024 17:53) (97/66 - 97/66)  BP(mean): --  RR: 20 (12 Dec 2024 17:53) (20 - 20)  SpO2: 96% (12 Dec 2024 17:53) (96% - 96%)    Parameters below as of 12 Dec 2024 17:53  Patient On (Oxygen Delivery Method): room air    LABS:                        9.1    9.06  )-----------( 286      ( 12 Dec 2024 18:27 )             29.9     12-12    132[L]  |  96  |  11  ----------------------------<  162[H]  4.0   |  21[L]  |  0.55    Ca    8.7      12 Dec 2024 18:27  Phos  3.7     12-12  Mg     1.8     12-12    TPro  6.1  /  Alb  3.0[L]  /  TBili  0.4  /  DBili  x   /  AST  19  /  ALT  17  /  AlkPhos  69  12-12    LIVER FUNCTIONS - ( 12 Dec 2024 18:27 )  Alb: 3.0 g/dL / Pro: 6.1 g/dL / ALK PHOS: 69 U/L / ALT: 17 U/L / AST: 19 U/L / GGT: x           PT/INR - ( 12 Dec 2024 18:27 )   PT: 13.9 sec;   INR: 1.22 ratio      PTT - ( 12 Dec 2024 18:27 )  PTT:68.2 sec  Urinalysis Basic - ( 12 Dec 2024 18:27 )    Color: x / Appearance: x / SG: x / pH: x  Gluc: 162 mg/dL / Ketone: x  / Bili: x / Urobili: x   Blood: x / Protein: x / Nitrite: x   Leuk Esterase: x / RBC: x / WBC x   Sq Epi: x / Non Sq Epi: x / Bacteria: x    PHYSICAL EXAM:  General: NAD  Pulmonary: Normal resp effort, on room air  Cardiovascular: Tachycardic to 110s, normotensive  Breast: L breast fullness, enlarged compared to the R, non-tender to palpation, wound at inferior aspect from biopsy site to the subcutaneous tissue with serosanguinous drainage  Neuro: A/O x 3

## 2024-12-12 NOTE — ED ADULT NURSE NOTE - OBJECTIVE STATEMENT
38 year old female presented to ED via [facility] from [facility] with c/o of bilateral PE's and possible clot to right atrium. hx diagnosed with breast CA 15 days ago, started chemotherapy 5 days ago. pt went to [facility] for R sided flank pain and dyspnea on exertion starting yesterday. transferred to [facility] on Heparin 17mL/hour as per MD order at [facility]. pt denies CP, SOB, nausea/vomiting, numbness/tingling, fever, cough, chills, dizziness, headache, blurred vision, neuro intact. pt a&ox3, lung sounds clear, heart rate regular, on cardiac monitor, EKG completed handed to MD, abdomen soft nontender nondistended to palp. skin intact except wound under left breast s/p biopsy. IV in RAC 20G and patent placed by [facility]. Do not use band on left arm. Will continue to monitor and assess while offering support and reassurance.

## 2024-12-12 NOTE — ED PROVIDER NOTE - CLINICAL SUMMARY MEDICAL DECISION MAKING FREE TEXT BOX
38Y F hx as above transferred form [facility] with acute PE, suspected to be originating from mediport catheter, as well as atrial thrombus. Arrives on heparin gtt. VS notable for low grade tachycardia to 105, BP 99/66, satting 95-96% on room air.  Denies chest pain or shortness of breath at this time.  Has ongoing right flank pain which brought her to the hospital today.  Interventional cardiology is at bedside, at this time recommending continuing heparin drip although concern for possible hemorrhage into necrotic mass, requesting surgery consults and evaluation for hemorrhage watch as at this time risk of complication related to PE/atrial thrombus outweighs risk of future bleeding.  A stat echo was ordered, technician at bedside in ED.  Head CT was also ordered given systemic anticoagulation.  Will consult surgery, plan for admission.

## 2024-12-12 NOTE — ED PROVIDER NOTE - ATTENDING CONTRIBUTION TO CARE
Attending MD Davis:  I have seen and examined this patient and fully participated in the care of this patient as the teaching attending. I personally made/approved the management plan and take responsibility for the patient management.      38-year-old woman with history of recently diagnosed breast cancer, recently had first dose of chemotherapy on 12/5 via right port is presenting as transfer from outside hospital for bilateral pulmonary emboli.  The patient states that she developed severe right sided mid back pain and flank pain that started last night.  It was somewhat pleuritic.  Denied any shortness of breath near syncope headache nausea or vomiting.  No leg swelling or pain.  Patient underwent CT imaging at outside hospital that showed "bilateral lower lobe pulmonary emboli.  2.9 x 1.2 cm filling defect in the right atrium suspicious for blood clot.  Large partially necrotic left breast mass measuring up to 15.9 cm with associated parenchymal edema and skin thickening consistent with malignancy."  Patient arrives on heparin infusion.    Patient's vital signs are notable for heart rate 102 blood pressure 97 systolic oxygen saturation is 96% on room air.  Patient is sitting up in the stretcher in no apparent respiratory distress.  Right port site with overlying skin adhesive glue no surrounding erythema warmth or tenderness.  Large amount of swelling to the left breast, in the inferior portion of the breast there is dripping serosanguineous discharge.  Extremities warm to the touch moves all extremity spontaneously.    Patient is presenting for evaluation as transfer from outside hospital for bilateral pulmonary emboli in the setting of recent right port placement for breast cancer.  Patient was met in the emergency department by interventional cardiology Dr. Foster, he recommends at this time we obtain stat echocardiogram to rule out right heart strain, continue heparin infusion for now however he recommends obtaining urgent screening head CT to rule out intracranial subclinical masses, also recommends we contact breast surgery in the event that patient develops bleeding complications into the left breast from heparin infusion.  Given patient's overall stability there is no plans for any emergent catheter directed therapies at this time however in the near future there may be plan for removal of clot from the port as the ports being removed.  Will follow the recommendations of interventional cardiology       *The above represents an initial assessment/impression. Please refer to progress notes for potential changes in patient clinical course*

## 2024-12-12 NOTE — ED PROVIDER NOTE - PHYSICAL EXAMINATION
GENERAL: Awake, alert, NAD  HEAD: NC/AT, neck supple, moist mucous membranes  EYES: PERRL, EOM grossly intact, sclera anicteric  LUNGS: normal WOB on RA, CTAB, no wheezes or crackles   CARDIAC: tachy to low 100s on CM, no m/r/g  Chest: significant swelling with overlying skin change noted to L breast, there is an open wound ~2cm on inferior aspect of breast that is actively draining serosanguinous fluid  ABDOMEN: Soft, non tender, non distended, no rebound, no guarding  BACK: No midline spinal tenderness, no CVA tenderness  EXT: No edema, no calf tenderness, no deformities.  NEURO: A&Ox3. Moving all extremities without focal deficit.  SKIN: Warm and dry. No rash.  PSYCH: Normal affect.

## 2024-12-12 NOTE — CONSULT NOTE ADULT - ASSESSMENT
Assessment:  38yr F with past medical history of L breast mass recently diagnosed as poorly differentiated invasive ductal carcinoma (following with Dr. Mack Major), first round of chemotherapy completed on 12/5 who presented to [facility] with R flank/chest pain found to have bilateral lower lobe PEs and concern for R atrial clot. Patient started on heparin gtt and transferred to [facility] for PERT team evaluation and further management. Breast surgery consulted due to concerns for future bleed on anticoagulation.    Plan:  - No acute surgical intervention indicated   - Continue anticoagulation given PE and c/f R atrial clot  - Local wound care for L breast biopsy site  - If concerns for active bleeding please call the surgery team    Discussed with Dr. Shubham Montoya Surgery  [phone]   Assessment:  38yr F with past medical history of L breast mass recently diagnosed as poorly differentiated invasive ductal carcinoma (following with Dr. Mack Major), first round of chemotherapy completed on 12/5 who presented to [facility] with R flank/chest pain found to have bilateral lower lobe PEs and concern for R atrial clot. Patient started on heparin gtt and transferred to [facility] for PERT team evaluation and further management. Breast surgery consulted due to concerns for future bleed on anticoagulation.    Plan:  - No acute surgical intervention indicated   - Continue anticoagulation given PE and c/f R atrial clot  - Local wound care for L breast biopsy site  - If concerns for active bleeding please call the surgery team  - Will notify patient's outpatient provider Dr. Mack Major in the AM of her admission to the hospital    Discussed with Dr. Shubham Montoya Surgery  [phone]   Assessment:  38yr F with past medical history of L breast mass recently diagnosed as poorly differentiated invasive ductal carcinoma (following with Dr. Mack Major), first round of chemotherapy completed on 12/5 who presented to [facility] with R flank/chest pain found to have bilateral lower lobe PEs and concern for R atrial clot. Patient started on heparin gtt and transferred to [facility] for PERT team evaluation and further management. Breast surgery consulted due to concerns for future bleed on anticoagulation.    Plan:  - No acute surgical intervention indicated   - Continue anticoagulation given PE and c/f R atrial clot  - Local wound care for L breast biopsy site  - If concerns for active bleeding please call the surgery team  - Dr. Major aware of patient's inpatient status, would like patient to follow up after discharge      Discussed with Dr. Shubham Montoya Surgery  [phone]

## 2024-12-12 NOTE — CONSULT NOTE ADULT - SUBJECTIVE AND OBJECTIVE BOX
Cardiology Consult Note   [Please check [email or web address]: [email or web address] for cardiology service schedule and contact information]    HPI:     Ms. Bartholomew is a 39 yo F with recent diagnosis of breast cancer (told was curable with chemo and surgery, last chemo 5 days ago, has R port in place), presented from [facility] for bilateral PE.    started feeling R sided mid back and flank pain that started last night, pleuritic. no sob or syncope. hasn't noticed any extremity swelling or pain. has RA 2.9x1.2 cm filling defect suspicious for blood clot. has large left partially necrotic breast mass 15.9 cm with edema and skin thickening consistent with malignancy.    Pt arrived to [facility] ED on heparin gtt.  afebrile, p 102, BP 97/66, RR 20, SpO2 96% on RA.   vbg 7.35/44/lactate 1.5  *** probnp, ***trop  ***ekg  stat TTE and blle DVT US ordered  CT head ordered to rule out brain mets        PAST MEDICAL & SURGICAL HISTORY:    FAMILY HISTORY:    SOCIAL HISTORY:  unchanged    MEDICATIONS:                    -------------------------------------------------------------------------------------------  PHYSICAL EXAM:  T(C): 36.6 (12-12-24 @ 17:53), Max: 36.6 (12-12-24 @ 17:53)  HR: 102 (12-12-24 @ 17:53) (102 - 102)  BP: 97/66 (12-12-24 @ 17:53) (97/66 - 97/66)  RR: 20 (12-12-24 @ 17:53) (20 - 20)  SpO2: 96% (12-12-24 @ 17:53) (96% - 96%)  Wt(kg): --  I&O's Summary      GENERAL: NAD  HEAD: Atraumatic, Normocephalic.  ENT: Moist mucous membranes.  NECK: Supple, No JVD.  CHEST/LUNG: Clear to auscultation bilaterally; No rales, rhonchi, wheezing, or rubs. Unlabored respirations. serosanguinous discharge from L breast, port in place on R side of chest with no discharge/erythema  HEART: Regular rate and rhythm; No murmurs, rubs, or gallops.  ABDOMEN: Bowel sounds present; Soft, Nontender, Nondistended.   EXTREMITIES:  2+ Peripheral Pulses, brisk capillary refill. No clubbing, cyanosis, or edema.    -------------------------------------------------------------------------------------------  LABS:                                           Cardiology Consult Note   [Please check [email or web address]: [email or web address] for cardiology service schedule and contact information]    HPI:     Ms. Bartholomew is a 37 yo F with recent diagnosis of breast cancer (told was curable with chemo and surgery, last chemo 5 days ago, has R port in place), presented from [facility] for bilateral PE.    started feeling R sided mid back and flank pain that started last night, pleuritic. no sob or syncope. hasn't noticed any extremity swelling or pain. has RA 2.9x1.2 cm filling defect suspicious for blood clot. has large left partially necrotic breast mass 15.9 cm with edema and skin thickening consistent with malignancy. doesn't take any hormonal medications, no recent surgery (just L breast biopsy), has been sedentary for the last week due to chemotherapy    Pt arrived to [facility] ED on heparin gtt.  afebrile, p 102, BP 97/66, RR 20, SpO2 96% on RA.   vbg 7.35/44/lactate 1.5  hgb 9 (unknown bl), plt 286, aptt 68 (therapeutic on heparin), Na 132, Cr 0.5, lft wnl, trop <6, probnp 88  NSR  stat TTE and blle DVT US ordered  CT head ordered to rule out brain mets        PAST MEDICAL & SURGICAL HISTORY:    FAMILY HISTORY:    SOCIAL HISTORY:  unchanged    MEDICATIONS:                    -------------------------------------------------------------------------------------------  PHYSICAL EXAM:  T(C): 36.6 (12-12-24 @ 17:53), Max: 36.6 (12-12-24 @ 17:53)  HR: 102 (12-12-24 @ 17:53) (102 - 102)  BP: 97/66 (12-12-24 @ 17:53) (97/66 - 97/66)  RR: 20 (12-12-24 @ 17:53) (20 - 20)  SpO2: 96% (12-12-24 @ 17:53) (96% - 96%)  Wt(kg): --  I&O's Summary      GENERAL: NAD  HEAD: Atraumatic, Normocephalic.  ENT: Moist mucous membranes.  NECK: Supple, No JVD.  CHEST/LUNG: Clear to auscultation bilaterally; No rales, rhonchi, wheezing, or rubs. Unlabored respirations. serosanguinous discharge from L breast at biopsy site, port in place on R side of chest with no discharge/erythema  HEART: Regular rate and rhythm; No murmurs, rubs, or gallops.  ABDOMEN: Bowel sounds present; Soft, Nontender, Nondistended.   EXTREMITIES:  2+ Peripheral Pulses, brisk capillary refill. No clubbing, cyanosis, or edema.    -------------------------------------------------------------------------------------------  LABS:

## 2024-12-13 DIAGNOSIS — Z29.9 ENCOUNTER FOR PROPHYLACTIC MEASURES, UNSPECIFIED: ICD-10-CM

## 2024-12-13 DIAGNOSIS — I26.99 OTHER PULMONARY EMBOLISM WITHOUT ACUTE COR PULMONALE: ICD-10-CM

## 2024-12-13 DIAGNOSIS — D64.9 ANEMIA, UNSPECIFIED: ICD-10-CM

## 2024-12-13 DIAGNOSIS — E87.1 HYPO-OSMOLALITY AND HYPONATREMIA: ICD-10-CM

## 2024-12-13 LAB
APTT BLD: 28.8 SEC — SIGNIFICANT CHANGE UP (ref 24.5–35.6)
APTT BLD: 32.7 SEC — SIGNIFICANT CHANGE UP (ref 24.5–35.6)
APTT BLD: 51.3 SEC — HIGH (ref 24.5–35.6)
CULTURE RESULTS: SIGNIFICANT CHANGE UP
HCT VFR BLD CALC: 28.6 % — LOW (ref 34.5–45)
HGB BLD-MCNC: 8.6 G/DL — LOW (ref 11.5–15.5)
MCHC RBC-ENTMCNC: 21.9 PG — LOW (ref 27–34)
MCHC RBC-ENTMCNC: 30.1 G/DL — LOW (ref 32–36)
MCV RBC AUTO: 73 FL — LOW (ref 80–100)
NRBC # BLD: 0 /100 WBCS — SIGNIFICANT CHANGE UP (ref 0–0)
PLATELET # BLD AUTO: 295 K/UL — SIGNIFICANT CHANGE UP (ref 150–400)
RBC # BLD: 3.92 M/UL — SIGNIFICANT CHANGE UP (ref 3.8–5.2)
RBC # FLD: 15.9 % — HIGH (ref 10.3–14.5)
SPECIMEN SOURCE: SIGNIFICANT CHANGE UP
WBC # BLD: 11.63 K/UL — HIGH (ref 3.8–10.5)
WBC # FLD AUTO: 11.63 K/UL — HIGH (ref 3.8–10.5)

## 2024-12-13 PROCEDURE — 99223 1ST HOSP IP/OBS HIGH 75: CPT

## 2024-12-13 PROCEDURE — 93970 EXTREMITY STUDY: CPT | Mod: 26

## 2024-12-13 PROCEDURE — 75574 CT ANGIO HRT W/3D IMAGE: CPT | Mod: 26

## 2024-12-13 RX ORDER — ACETAMINOPHEN 500MG 500 MG/1
650 TABLET, COATED ORAL EVERY 6 HOURS
Refills: 0 | Status: DISCONTINUED | OUTPATIENT
Start: 2024-12-13 | End: 2024-12-15

## 2024-12-13 RX ORDER — ACETAMINOPHEN 500MG 500 MG/1
1000 TABLET, COATED ORAL ONCE
Refills: 0 | Status: COMPLETED | OUTPATIENT
Start: 2024-12-13 | End: 2024-12-13

## 2024-12-13 RX ADMIN — Medication 2400 UNIT(S)/HR: at 10:43

## 2024-12-13 RX ADMIN — Medication 4000 UNIT(S): at 02:03

## 2024-12-13 RX ADMIN — Medication 8000 UNIT(S): at 19:54

## 2024-12-13 RX ADMIN — Medication 2000 UNIT(S)/HR: at 02:05

## 2024-12-13 RX ADMIN — Medication 8000 UNIT(S): at 10:57

## 2024-12-13 RX ADMIN — Medication 2800 UNIT(S)/HR: at 19:49

## 2024-12-13 RX ADMIN — ACETAMINOPHEN 500MG 400 MILLIGRAM(S): 500 TABLET, COATED ORAL at 06:19

## 2024-12-13 RX ADMIN — Medication 2000 UNIT(S)/HR: at 08:26

## 2024-12-13 RX ADMIN — ACETAMINOPHEN 500MG 1000 MILLIGRAM(S): 500 TABLET, COATED ORAL at 07:10

## 2024-12-13 RX ADMIN — Medication 2400 UNIT(S)/HR: at 19:20

## 2024-12-13 NOTE — PATIENT PROFILE ADULT - HAVE YOU EXPERIENCED VIOLENCE OR A TRAUMATIC EVENT?
Quality 137: Melanoma: Continuity Of Care - Recall System: Patient information entered into a recall system that includes: target date for the next exam specified AND a process to follow up with patients regarding missed or unscheduled appointments Detail Level: Detailed When Should The Patient Follow-Up For Their Next Full-Body Skin Exam?: 6 Months Detail Level: Simple Hide Include Location In Plan Question?: No no

## 2024-12-13 NOTE — PATIENT PROFILE ADULT - VISION (WITH CORRECTIVE LENSES IF THE PATIENT USUALLY WEARS THEM):
Addended by: BERT TAYLOR on: 7/28/2022 11:55 AM     Modules accepted: Orders    
Normal vision: sees adequately in most situations; can see medication labels, newsprint

## 2024-12-13 NOTE — H&P ADULT - HISTORY OF PRESENT ILLNESS
Patient is a 38 year old female with  newly diagnosed poorly differentiated breast ca, recently started neoadjuvant chemotherapy at [facility] presented to [facility] with complaint of R sided back pain with dyspnea , found to have  bilateral PE with possible clot in right atrium, transferred to [facility] for PERT evaluation. At the time of admission, patient states she has noticed some bleeding from left breast biopsy site, but denies any other acute symptoms. She states she was having back pain and dyspnea prior and that was the reason she went to [facility], but it is not resolved. On ROS, she denies headache, dizziness, fever, chills, chest pain, dyspnea, orthopnea, palpitations, abdominal pain, nausea, vomiting, gum bleeding, nose bleeding, hemoptysis blood in stool, hematuria, bruising, any new rash, swelling, weakness, numbness, difficulty ambulating, cough, dysphagia, congestion, hearing change, change in vision, tinnitus, or change in appetite. On ROS she states she feels sad but denies SI/HI. She is eager to eat.       Chart reviewed for previous outside hospital notes, outpatient notes, labs, imaging, specialist notes, ED notes.

## 2024-12-13 NOTE — H&P ADULT - TIME BILLING
no edema,  no murmurs,  regular rate and rhythm Time-based billing (NON-critical care).     The necessity of the time spent during the encounter on this date of service was due to:     - Ordering, reviewing, and interpreting labs, testing, and imaging.  - Independently obtaining a review of systems and performing a physical exam  - Reviewing prior hospitalization and where necessary, outpatient records.  - Counselling and educating patient and family regarding interpretation of aforementioned items and plan of care.

## 2024-12-13 NOTE — H&P ADULT - PROBLEM SELECTOR PLAN 1
#bilateral PE   #RA clot    - AC: patient arrived to [facility] ED on heparin gtt (discussed with clinical pharmacist, continue heparin gtt as previously ordered).  - patient was planned for MIKEY this am, however she refused. Now plan for cardiac CT - pending.   - GOC discussion documented by vascular team and also as above with me, ?that patient has very poor insight?, will need repeat GOC discussion, SW consulted, consider psych eval.   - Check BL LE duplex US.   - F/U vascular cardiology and surgery teams for further recs.   >     > Surgery team paged again during admission due to concern for serosanguinous oozing from patient's left breast.   - call cardiology if becomes hypotensive, may need TPA at that point.  - daily bmp for Mg>2, K>4.  - Telemetry. #bilateral PE   #RA clot  #nonhealing breast wound/drainage.    - AC: patient arrived to [facility] ED on heparin gtt (discussed with clinical pharmacist, continue heparin gtt as previously ordered).  - patient was planned for MIKEY this am, however she refused. Now plan for cardiac CT - pending.   - GOC discussion documented by vascular team and also as above with me, ?that patient has very poor insight?, will need repeat GOC discussion, SW consulted, consider psych eval.   - Check BL LE duplex US.   - F/U vascular cardiology and surgery teams for further recs.   >     > Surgery team paged again during admission due to concern for serosanguinous oozing from patient's left breast.   - call cardiology if becomes hypotensive, may need TPA at that point.  - daily bmp for Mg>2, K>4.  - Telemetry. #bilateral PE   #RA clot  #nonhealing breast wound/drainage.    - AC: patient arrived to [facility] ED on heparin gtt (discussed with clinical pharmacist, continue heparin gtt as previously ordered).  - patient was planned for MIKEY this am, however she refused. Now plan for cardiac CT - pending.   - GOC discussion documented by vascular team and also as above with me, ?that patient has very poor insight?, will need repeat GOC discussion, SW consulted, consider psych eval.   - Check BL LE duplex US.   - F/U vascular cardiology and surgery teams for further recs.   >     > Surgery team paged again during admission due to concern for serosanguinous oozing from patient's left breast.   - call cardiology if becomes hypotensive, may need TPA at that point.  - daily bmp for Mg>2, K>4.  - Telemetry.      ==> CHART UPDATE: Case discussed with vascular cards attending, if patient agreeable to procedure again then it will have to be likely done on Monday now. Case discussed with IR regarding question of mediport/alternative access for chemo. Case discussed with Oncology attending and they rec prioritizing AC and clot evacuation, surgery evaluation for bleeding biopsy site, that outpatient oncologist Dr. Calderon agrees with plan for vascular intervention. Case discussed with breast surgery attending and they rec local wound care for now and that patient needs to follow up with outpatient Dr. Mack Major.

## 2024-12-13 NOTE — PATIENT PROFILE ADULT - FUNCTIONAL ASSESSMENT - DAILY ACTIVITY ASSESSMENT TYPE
Admission
For information on Fall & Injury Prevention, visit [email or web address]

## 2024-12-13 NOTE — H&P ADULT - ASSESSMENT
Patient is a 38 year old female with  newly diagnosed poorly differentiated breast ca, recently started neoadjuvant chemotherapy at [facility] presented to [facility] with complaint of R sided back pain with dyspnea , found to have  bilateral PE with possible clot in right atrium, transferred to [facility] for PERT evaluation.

## 2024-12-13 NOTE — H&P ADULT - PROBLEM SELECTOR PLAN 5
DVT PPX: on Heparin gtt  Diet: Regular       Dietician/ Nutritionist Eval.   OOBTC, ambulate as tolerated. Fall precautions. Aspiration precautions. Delirium prevention measures, Promote sleep hygiene. Incentive spirometry.   # Dispo: pending clinical improvement, anticipate discharge back to home, F/U CM.  # Med recs done per notes from [facility], patient arrived on heparin gtt.

## 2024-12-13 NOTE — PRE-ANESTHESIA EVALUATION ADULT - NSRADCARDRESULTSFT_GEN_ALL_CORE
< from: TTE W or WO Ultrasound Enhancing Agent (12.12.24 @ 18:31) >    CONCLUSIONS:      1. Technically difficulty study.   2. Left ventricular endocardium is not well visualized; however, the left ventricular systolic function appears grossly normal.   3. There is poor visualization of all the endocardial borders to determine the presence of wall motion abnormalities. Probably normal.   4. The right ventricle is not well visualized. Based on visual assessment, the right ventricle appears normal in size and right ventricular systolic function is normal.   5. Unable to exclude the presence of right atrium, superior or inferior vena cava mass.   6. No significant valvular disease within study limitations.   7. No prior echocardiogram is available for comparison.    < end of copied text >

## 2024-12-13 NOTE — H&P ADULT - NSHPPHYSICALEXAM_GEN_ALL_CORE
Vital Signs Last 24 Hrs  T(C): 37.2 (13 Dec 2024 09:26), Max: 37.2 (13 Dec 2024 03:28)  T(F): 99 (13 Dec 2024 03:28), Max: 99 (13 Dec 2024 03:28)  HR: 116 (13 Dec 2024 09:26) (101 - 120)  BP: 141/69 (13 Dec 2024 09:26) (97/47 - 141/69)  BP(mean): 94 (13 Dec 2024 09:26) (62 - 94)  RR: 18 (13 Dec 2024 09:26) (16 - 20)  SpO2: 95% (13 Dec 2024 09:26) (95% - 100%)    Parameters below as of 13 Dec 2024 03:28  Patient On (Oxygen Delivery Method): room air      CONSTITUTIONAL: Well-developed, obese  EYES: No conjunctival or scleral injection, non-icteric; PERRLA and symmetric  ENMT: No external nasal lesions; nasal mucosa not inflamed, oral mucosa with moist membranes  RESPIRATORY: Breathing comfortably; lungs CTABL  CARDIOVASCULAR: +S1S2, RRR, no M/G/R; pedal pulses full and symmetric; no peripheral edema   GASTROINTESTINAL: No palpable masses or tenderness, soft, +BS throughout,   MUSCULOSKELETAL: No digital clubbing or cyanosis;  normal strength and tone of extremities  SKIN: No rashes or ulcers noted; no subcutaneous nodules or induration palpable  NEUROLOGIC: sensation intact in LEs b/l to light touch,  motor strength 5/5 all extremities   PSYCHIATRIC: A+O x 3; mood and affect appropriate; calm, cooperative. poor insight.

## 2024-12-13 NOTE — H&P ADULT - PROBLEM SELECTOR PLAN 2
Started chemo past week, has R port.   Critical access hospital consulted on admission. F/U on their eval and recs.

## 2024-12-13 NOTE — CONSULT NOTE ADULT - SUBJECTIVE AND OBJECTIVE BOX
Reason for consult: triple negative breast ca, anemia, new PE    HPI:  Patient is a 38 year old female with  newly diagnosed poorly differentiated breast ca, recently started neoadjuvant chemotherapy at [facility] presented to [facility] with complaint of R sided back pain with dyspnea , found to have  bilateral PE with possible clot in right atrium, transferred to [facility] for PERT evaluation. At the time of admission, patient states she has noticed some bleeding from left breast biopsy site, but denies any other acute symptoms. She states she was having back pain and dyspnea prior and that was the reason she went to [facility], but it is not resolved. On ROS, she denies headache, dizziness, fever, chills, chest pain, dyspnea, orthopnea, palpitations, abdominal pain, nausea, vomiting, gum bleeding, nose bleeding, hemoptysis blood in stool, hematuria, bruising, any new rash, swelling, weakness, numbness, difficulty ambulating, cough, dysphagia, congestion, hearing change, change in vision, tinnitus, or change in appetite. On ROS she states she feels sad but denies SI/HI. She is eager to eat.       Chart reviewed for previous outside hospital notes, outpatient notes, labs, imaging, specialist notes, ED notes.  (13 Dec 2024 11:49)      PAST MEDICAL & SURGICAL HISTORY:  Breast mass          FAMILY HISTORY:      Alochol: Denied  Smoking: Nonsmoker  Drug Use: Denied  Marital Status:         Allergies    No Known Allergies    Intolerances        MEDICATIONS  (STANDING):  acetaminophen   IVPB .. 1000 milliGRAM(s) IV Intermittent once  heparin  Infusion.  Unit(s)/Hr (18 mL/Hr) IV Continuous <Continuous>    MEDICATIONS  (PRN):  acetaminophen     Tablet .. 650 milliGRAM(s) Oral every 6 hours PRN Temp greater or equal to 38C (100.4F), Mild Pain (1 - 3)  heparin   Injectable 8000 Unit(s) IV Push every 6 hours PRN For aPTT less than 40  heparin   Injectable 4000 Unit(s) IV Push every 6 hours PRN For aPTT between 40 - 57      ROS  No fever, sweats, chills  No epistaxis, HA, sore throat  No CP, SOB, cough, sputum  No n/v/d, abd pain, melena, hematochezia  No edema  No rash  No anxiety  No back pain, joint pain  No bleeding, bruising  No dysuria, hematuria    T(C): 37 (12-13-24 @ 12:40), Max: 37.2 (12-13-24 @ 03:28)  HR: 98 (12-13-24 @ 12:40) (98 - 120)  BP: 102/65 (12-13-24 @ 12:40) (97/47 - 141/69)  RR: 18 (12-13-24 @ 12:40) (16 - 20)  SpO2: 92% (12-13-24 @ 12:40) (92% - 100%)  Wt(kg): --    PE  NAD  Awake, alert  Anicteric, MMM  RRR  CTAB  Abd soft, NT, ND  No c/c/e  No rash grossly  FROM                          8.6    11.63 )-----------( 295      ( 13 Dec 2024 00:40 )             28.6       12-12    132[L]  |  96  |  11  ----------------------------<  162[H]  4.0   |  21[L]  |  0.55    Ca    8.7      12 Dec 2024 18:27  Phos  3.7     12-12  Mg     1.8     12-12    TPro  6.1  /  Alb  3.0[L]  /  TBili  0.4  /  DBili  x   /  AST  19  /  ALT  17  /  AlkPhos  69  12-12

## 2024-12-13 NOTE — CONSULT NOTE ADULT - ASSESSMENT
Patient is a 38 year old female with  newly diagnosed poorly differentiated breast ca, recently started neoadjuvant chemotherapy at [facility] presented to [facility] with complaint of R sided back pain with dyspnea , found to have  bilateral PE with possible clot in right atrium, transferred to [facility] for PERT evaluation.    1. 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 Nuvia Quezada of [facility]   --underwent diagnostic mammogram on 10/16/24 which revealed multiple suspicious masses in the left breast and the left axilla.  --underwent biopsy on 11/4/24 which revealed poorly differentiated invasive carcinoma, ER/OK/HER2 negative at 2:00, 6:00 and in the left axilla.  --outpatient CT c/a/p w/   1. Large necrotic left breast mass measuring at least 16.4 cm, not completely included in the field of view. Adjacent left breast mass measuring up to 5.5 cm.  2. Numerous necrotic bulky enlarged left subpectoral and left axillary lymph nodes, compatible with metastasis.  3. No metastatic disease in the abdomen or pelvis.    --s/p Insertion of right-sided power-injectable single-lumen tunneled chest port on 12/06--> with catheter tip in the expected location of the cavoatrial junction  --started treatment on 12/05 w/ Carboplatin AUC5 D1 + Taxol 80mg/m2 D1,8,15 + Keytruda 200mg D1 Q21 days x 4 cycles. Next treatment scheduled for Monday, 12/16.  --no plan for treatment while inpatient  --ongoing care after discharge    2. Leukocytosis, Anemia  --leukocytosis in the setting of new PE  --Hgb 11-11.4 at baseline, now lower d/t chemo.   --Outpatient labs also c/w WILMA, pt w/ %sat of 7. Scheduled to start IV iron in outpatient clinic.    3. bilateral PE, new  --Found upon admission to [facility], pt transferred to [facility] for PERT   --Filling defect in first order right lower lobe segmental branch extending into the subsegmental lower lobe branches. Second order pulmonary embolism in the left lower lobe subsegmental branch.   --LE duplex w/ No evidence of deep venous thrombosis in either lower extremity.  --vascular following, NPO after midnight for possible intervention tomorrow 12/14  --on heparin gtt, can transition to DOAC on discharge    will follow,    Charline Breaux NP  Hematology/ Oncology  [facility]  [phone] (office)  [phone] (alt office)  Evenings and weekends please call MD on call or office     Patient is a 38 year old female with  newly diagnosed poorly differentiated breast ca, recently started neoadjuvant chemotherapy at [facility] presented to [facility] with complaint of R sided back pain with dyspnea , found to have  bilateral PE with possible clot in right atrium, transferred to [facility] for PERT evaluation.    1. 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 Nuvia Quezada of [facility]   --locally advanced, w/ good prognosis   --underwent diagnostic mammogram on 10/16/24 which revealed multiple suspicious masses in the left breast and the left axilla.  --underwent biopsy on 11/4/24 which revealed poorly differentiated invasive carcinoma, ER/DE/HER2 negative at 2:00, 6:00 and in the left axilla.  --outpatient CT c/a/p w/   1. Large necrotic left breast mass measuring at least 16.4 cm, not completely included in the field of view. Adjacent left breast mass measuring up to 5.5 cm.  2. Numerous necrotic bulky enlarged left subpectoral and left axillary lymph nodes, compatible with metastasis.  3. No metastatic disease in the abdomen or pelvis.    --s/p Insertion of right-sided power-injectable single-lumen tunneled chest port on 12/06--> with catheter tip in the expected location of the cavoatrial junction  --started treatment on 12/05 w/ Carboplatin AUC5 D1 + Taxol 80mg/m2 D1,8,15 + Keytruda 200mg D1 Q21 days x 4 cycles. Next treatment scheduled for Monday, 12/16.  --no plan for treatment while inpatient  --ongoing care after discharge    2. Leukocytosis, Anemia  --leukocytosis in the setting of new PE  --Hgb 11-11.4 at baseline, now lower d/t chemo.   --Outpatient labs also c/w WILMA, pt w/ %sat of 7. Scheduled to start IV iron in outpatient clinic.  --surgery consulted d/t bleed from L breast biopsy site, noacute surgical intervention indicated     3. bilateral PE, new  --Found upon admission to [facility], pt transferred to [facility] for PERT   --Filling defect in first order right lower lobe segmental branch extending into the subsegmental lower lobe branches. Second order pulmonary embolism in the left lower lobe subsegmental branch.   --LE duplex w/ No evidence of deep venous thrombosis in either lower extremity.  --vascular following, NPO after midnight for possible intervention tomorrow 12/14  --was scheduled for TTE this morning but refused, now plan for possible cardiac CT. (as per event note) Patient very anxious at baseline which is why she refused TTE, she is now agreeable to plan of care. D/w team. Per Dr Quezada, pt is odd and has some learning limitations but mostly d/t her anxiety. Please consult psych for ongoing concerns re patients capacity to make decisions.   --on heparin gtt, can transition to DOAC on discharge  --call cardiology if becomes hypotensive, consider further embolization of thrombus causing hemodynamically significant PE  will follow,    Charline Breaux NP  Hematology/ Oncology  [facility]  [phone] (office)  [phone] (alt office)  Evenings and weekends please call MD on call or office

## 2024-12-13 NOTE — PATIENT PROFILE ADULT - FALL HARM RISK - HARM RISK INTERVENTIONS

## 2024-12-13 NOTE — PRE-ANESTHESIA EVALUATION ADULT - NSANTHOSAYNRD_GEN_A_CORE
No. SAMARIA screening performed.  STOP BANG Legend: 0-2 = LOW Risk; 3-4 = INTERMEDIATE Risk; 5-8 = HIGH Risk

## 2024-12-13 NOTE — PROGRESS NOTE ADULT - SUBJECTIVE AND OBJECTIVE BOX
Gian Solo MD  Cardiology Fellow  All Cardiology service information can be found 24/7 on [email or web address], password: [employer]    Overnight Events:   This AM, went for MIKEY, however at the time of consent, the patient refused the procedure. Having some bleeding at core biopsy breast site, no pain.   Patient seen and examined at bedside.  No chest pain, shortness of breath, or palpitations     Current Meds:  acetaminophen     Tablet .. 650 milliGRAM(s) Oral every 6 hours PRN  heparin   Injectable 8000 Unit(s) IV Push every 6 hours PRN  heparin   Injectable 4000 Unit(s) IV Push every 6 hours PRN  heparin  Infusion.  Unit(s)/Hr IV Continuous <Continuous>      PAST MEDICAL & SURGICAL HISTORY:  Breast mass          Vitals:  T(F): 98.6 (12-13), Max: 99 (12-13)  HR: 98 (12-13) (98 - 120)  BP: 102/65 (12-13) (97/47 - 141/69)  RR: 18 (12-13)  SpO2: 92% (12-13)  I&O's Summary      Physical Exam:  Appearance: No acute distress; comfortable appearing  HENT: Normal oral mucosa  Cardiovascular: RRR, S1, S2, no murmurs, rubs, or gallops; no edema; no JVD  Respiratory: Diminished breath sounds in R lung base.   Musculoskeletal: No clubbing; no joint deformity   Skin: No rashes, ecchymoses, or cyanosis. Serosanguinous discharge at L breast core biopsy site.                           8.6    11.63 )-----------( 295      ( 13 Dec 2024 00:40 )             28.6     12-12    132[L]  |  96  |  11  ----------------------------<  162[H]  4.0   |  21[L]  |  0.55    Ca    8.7      12 Dec 2024 18:27  Phos  3.7     12-12  Mg     1.8     12-12    TPro  6.1  /  Alb  3.0[L]  /  TBili  0.4  /  DBili  x   /  AST  19  /  ALT  17  /  AlkPhos  69  12-12    PT/INR - ( 12 Dec 2024 18:27 )   PT: 13.9 sec;   INR: 1.22 ratio         PTT - ( 13 Dec 2024 08:54 )  PTT:32.7 sec      New ECG(s): Personally reviewed    Echo:  < from: TTE W or WO Ultrasound Enhancing Agent (12.12.24 @ 18:31) >   1. Technically difficulty study.   2. Left ventricular endocardium is not well visualized; however, the left ventricular systolic function appears grossly normal.   3. There is poor visualization of all the endocardial borders to determine the presence of wall motion abnormalities. Probably normal.   4. The right ventricle is not well visualized. Based on visual assessment, the right ventricle appears normal in size and right ventricular systolic function is normal.   5. Unable to exclude the presence of right atrium, superior or inferior vena cava mass.   6. No significant valvular disease within study limitations.   7. No prior echocardiogram is available for comparison.    < end of copied text >      Stress Testing:     Cath:    Imaging:  < from: CT Head No Cont (12.12.24 @ 22:48) >  IMPRESSION:  No acute intracranial hemorrhage, mass effect, or midline shift.    If clinical concern persists for intracranial metastatic disease, a   contrast-enhanced MRI of the brain would be a more sensitive and specific   examination.      < end of copied text >      Interpretation of Telemetry:

## 2024-12-13 NOTE — CONSULT NOTE ADULT - NS ATTEND AMEND GEN_ALL_CORE FT
37 y/o F with TNBC locally advanced breast cancer receiving neoadjuvant keynote 522 regimen, admitted with bilateral PE's and thrombus at distal tip of port in the setting of COVID infection. No DVT on doppler. Started on a heparin gtt. Refused TTE. Febrile overnight, infectious work-up pending.

## 2024-12-13 NOTE — H&P ADULT - PROBLEM SELECTOR PLAN 3
Likely Acute on chronic.  Surgery consulted to evaluate for bleed at left breast biopsy site.   If concerns for active bleeding >> call the surgery team.  Monitor for any other s/s of bleed.   AC as above.  Monitor HH. Keep active T&S.  Check iron panel, ferritin, b12, folate.   F/U hem onc team for further recs.

## 2024-12-13 NOTE — PROGRESS NOTE ADULT - ASSESSMENT
38yr F with past medical history of L breast mass recently diagnosed as poorly differentiated invasive ductal carcinoma (following with Dr. Mack Major), first round of chemotherapy completed on 12/5 who presented to [facility] with R flank/chest pain found to have bilateral lower lobe PEs and concern for R atrial clot. Patient started on heparin gtt and transferred to [facility] for PERT team evaluation and further management. Breast surgery consulted due to concerns for future bleed on anticoagulation.     The output from the patient's left breast biopsy site is unchanged since being started on heparin. The drainage is serous with mild sang tinge which patient reports as normal since the biopsy was initially performed. There is no tim blood or concern for active bleeding. Prior to admission. patient was dressing the wound and managing the drainage herself at home.    Plan:  - No acute surgical intervention indicated   - Continue anticoagulation given PE and c/f R atrial clot  - Local wound care for L breast biopsy site  - Wound team consult  - Recommend aquacel for absorption  - Surgicel and hold pressure if there is bloody output from the biopsy site  - If concerns for active bleeding please call the surgery team  - Dr. Major aware of patient's inpatient status, would like patient to follow up after discharge      This case discussed with Dr. Jalloh and Dr. Pedrito Montoya Surgery  [id number]

## 2024-12-13 NOTE — H&P ADULT - NSHPSOCIALHISTORY_GEN_ALL_CORE
Patient states she is independent and her mother lives with her, that she works as a  on and off, she denies smoking/alcohol/illicit substance use.

## 2024-12-13 NOTE — H&P ADULT - CONVERSATION DETAILS
Patient refusing MIKEY presently. Discussed recommendations of vascular cardiology team, need for and risks/benefits of MIKEY, patient states that she will consider getting MIKEY done tomorrow after she has eaten and slept today. Explained to patient that she will need to be NPO after MN if she is going for procedure again, she states she understands the risks of foregoing the procedure. Patient's mother at bedside not engaging in discussion.

## 2024-12-13 NOTE — PRE-ANESTHESIA EVALUATION ADULT - NSANTHPMHFT_GEN_ALL_CORE
38yr F with past medical history of L breast CA, chemotherapy completed on 12/5 who presented to [facility] with R flank/chest pain found to have bilateral lower lobe PEs and concern for R atrial clot. Patient started on heparin gtt and transferred to [facility] for PERT team evaluation and further management. Patient denies SOB, chest pain, dizziness or palpitations at this time.

## 2024-12-13 NOTE — PROGRESS NOTE ADULT - SUBJECTIVE AND OBJECTIVE BOX
[facility] Daily Progress Note  =====================================================    SUBJECTIVE: Patient reports that they're feeling well. SOB improved somewhat overnight. Patient reports that drainage from the left breast is normal and she hasn't noticed any changes since being started on heparin. She denies dizziness and lightheadedness.    --------------------------------------------------------------------------------------  VITAL SIGNS:  T(C): 37.4 (12-13-24 @ 20:07), Max: 37.4 (12-13-24 @ 20:07)  HR: 117 (12-13-24 @ 20:07) (98 - 120)  BP: 123/72 (12-13-24 @ 20:07) (102/65 - 141/69)  RR: 18 (12-13-24 @ 20:07) (16 - 20)  SpO2: 92% (12-13-24 @ 20:07) (92% - 100%)  --------------------------------------------------------------------------------------    EXAM    General: NAD, resting in bed comfortably  Cardiac: Regular rate, normotensive  Respiratory: Nonlabored respirations, normal cw expansion, on RA  Breast: Left breast w/ area of herniating fat from 1.5cm biopsy site w/ serous output without tim blood  Extremities: Warm, well perfused  Neuro: AOx3, CNII-XII intact on gross examination    --------------------------------------------------------------------------------------

## 2024-12-13 NOTE — PROGRESS NOTE ADULT - ASSESSMENT
Ms. Bartholomew is a 37 yo F with recent diagnosis of breast cancer 11/7/2024 (told was curable with chemo and surgery, last chemo 5 days ago, has R port in place), presented from [facility] for bilateral PE, transferred to [facility] for further eval. PE is small and the patient has no current hemodynamic compromise, however CTA is showing likely catheter-associated thrombus in SVC/RA that could potentially embolize. The risk of this embolization must be weighed against the risk of bleeding into necrotic breast mass, as well as the risk of any further intervention such as thrombectomy. Given that the patient is currently HD stable, will favor monitoring on anticoagulation.     CTA Chest 12/12 at [facility]  Filling defect in first order right lower lobe segmental branch   extending into the subsegmental lower lobe branches. Second order   pulmonary embolism in the left lower lobe subsegmental branch.    TTE 12/12/24   1. Technically difficulty study.   2. Left ventricular endocardium is not well visualized; however, the left ventricular systolic function appears grossly normal.   3. There is poor visualization of all the endocardial borders to determine the presence of wall motion abnormalities. Probably normal.   4. The right ventricle is not well visualized. Based on visual assessment, the right ventricle appears normal in size and right ventricular systolic function is normal.   5. Unable to exclude the presence of right atrium, superior or inferior vena cava mass.   6. No significant valvular disease within study limitations.   7. No prior echocardiogram is available for comparison.    #bilateral PE   #RA clot  -continue heparin gtt with therapeutic PTT goal  -monitor on telemetry  -call cardiology if becomes hypotensive, consider further embolization of thrombus causing hemodynamically significant PE  - Recommend surg onc eval for core biopsy bleeding to see if other intervention. Monitor site for further bleeding while on AC.   - Also will appreciate Heme/Onc comment on the patient's prognosis to determine any future management.   -daily bmp for Mg>2, K>4

## 2024-12-14 DIAGNOSIS — R50.9 FEVER, UNSPECIFIED: ICD-10-CM

## 2024-12-14 LAB
A1C WITH ESTIMATED AVERAGE GLUCOSE RESULT: 5.9 % — HIGH (ref 4–5.6)
ALBUMIN SERPL ELPH-MCNC: 2.5 G/DL — LOW (ref 3.3–5)
ALP SERPL-CCNC: 90 U/L — SIGNIFICANT CHANGE UP (ref 40–120)
ALT FLD-CCNC: 18 U/L — SIGNIFICANT CHANGE UP (ref 10–45)
ANION GAP SERPL CALC-SCNC: 15 MMOL/L — SIGNIFICANT CHANGE UP (ref 5–17)
ANION GAP SERPL CALC-SCNC: 16 MMOL/L — SIGNIFICANT CHANGE UP (ref 5–17)
APPEARANCE UR: CLEAR — SIGNIFICANT CHANGE UP
APTT BLD: 43.1 SEC — HIGH (ref 24.5–35.6)
APTT BLD: 51.5 SEC — HIGH (ref 24.5–35.6)
APTT BLD: 72.9 SEC — HIGH (ref 24.5–35.6)
AST SERPL-CCNC: 18 U/L — SIGNIFICANT CHANGE UP (ref 10–40)
BACTERIA # UR AUTO: NEGATIVE /HPF — SIGNIFICANT CHANGE UP
BASOPHILS # BLD AUTO: 0 K/UL — SIGNIFICANT CHANGE UP (ref 0–0.2)
BASOPHILS NFR BLD AUTO: 0 % — SIGNIFICANT CHANGE UP (ref 0–2)
BILIRUB SERPL-MCNC: 0.3 MG/DL — SIGNIFICANT CHANGE UP (ref 0.2–1.2)
BILIRUB UR-MCNC: NEGATIVE — SIGNIFICANT CHANGE UP
BLD GP AB SCN SERPL QL: NEGATIVE — SIGNIFICANT CHANGE UP
BUN SERPL-MCNC: 12 MG/DL — SIGNIFICANT CHANGE UP (ref 7–23)
BUN SERPL-MCNC: 8 MG/DL — SIGNIFICANT CHANGE UP (ref 7–23)
CALCIUM SERPL-MCNC: 8.5 MG/DL — SIGNIFICANT CHANGE UP (ref 8.4–10.5)
CALCIUM SERPL-MCNC: 8.5 MG/DL — SIGNIFICANT CHANGE UP (ref 8.4–10.5)
CAST: 0 /LPF — SIGNIFICANT CHANGE UP (ref 0–4)
CHLORIDE SERPL-SCNC: 98 MMOL/L — SIGNIFICANT CHANGE UP (ref 96–108)
CHLORIDE SERPL-SCNC: 99 MMOL/L — SIGNIFICANT CHANGE UP (ref 96–108)
CK SERPL-CCNC: 29 U/L — SIGNIFICANT CHANGE UP (ref 25–170)
CO2 SERPL-SCNC: 21 MMOL/L — LOW (ref 22–31)
CO2 SERPL-SCNC: 22 MMOL/L — SIGNIFICANT CHANGE UP (ref 22–31)
COLOR SPEC: YELLOW — SIGNIFICANT CHANGE UP
CREAT SERPL-MCNC: 0.51 MG/DL — SIGNIFICANT CHANGE UP (ref 0.5–1.3)
CREAT SERPL-MCNC: 0.68 MG/DL — SIGNIFICANT CHANGE UP (ref 0.5–1.3)
CRP SERPL-MCNC: 340 MG/L — HIGH (ref 0–4)
D DIMER BLD IA.RAPID-MCNC: 1146 NG/ML DDU — HIGH
DACRYOCYTES BLD QL SMEAR: SLIGHT — SIGNIFICANT CHANGE UP
DIFF PNL FLD: ABNORMAL
EGFR: 114 ML/MIN/1.73M2 — SIGNIFICANT CHANGE UP
EGFR: 122 ML/MIN/1.73M2 — SIGNIFICANT CHANGE UP
ELLIPTOCYTES BLD QL SMEAR: SLIGHT — SIGNIFICANT CHANGE UP
EOSINOPHIL # BLD AUTO: 0 K/UL — SIGNIFICANT CHANGE UP (ref 0–0.5)
EOSINOPHIL NFR BLD AUTO: 0 % — SIGNIFICANT CHANGE UP (ref 0–6)
ESTIMATED AVERAGE GLUCOSE: 123 MG/DL — HIGH (ref 68–114)
FERRITIN SERPL-MCNC: 344 NG/ML — HIGH (ref 15–150)
FERRITIN SERPL-MCNC: 379 NG/ML — HIGH (ref 15–150)
FLUAV AG NPH QL: SIGNIFICANT CHANGE UP
FLUAV AG NPH QL: SIGNIFICANT CHANGE UP
FLUBV AG NPH QL: SIGNIFICANT CHANGE UP
FLUBV AG NPH QL: SIGNIFICANT CHANGE UP
FOLATE SERPL-MCNC: 4.5 NG/ML — LOW
GLUCOSE SERPL-MCNC: 148 MG/DL — HIGH (ref 70–99)
GLUCOSE SERPL-MCNC: 185 MG/DL — HIGH (ref 70–99)
GLUCOSE UR QL: NEGATIVE MG/DL — SIGNIFICANT CHANGE UP
HCT VFR BLD CALC: 26.1 % — LOW (ref 34.5–45)
HCT VFR BLD CALC: 26.4 % — LOW (ref 34.5–45)
HGB BLD-MCNC: 7.8 G/DL — LOW (ref 11.5–15.5)
HGB BLD-MCNC: 8 G/DL — LOW (ref 11.5–15.5)
INR BLD: 1.39 RATIO — HIGH (ref 0.85–1.16)
IRON SATN MFR SERPL: 4 % — LOW (ref 14–50)
IRON SATN MFR SERPL: 7 UG/DL — LOW (ref 30–160)
KETONES UR-MCNC: NEGATIVE MG/DL — SIGNIFICANT CHANGE UP
LDH SERPL L TO P-CCNC: 409 U/L — HIGH (ref 50–242)
LEUKOCYTE ESTERASE UR-ACNC: ABNORMAL
LYMPHOCYTES # BLD AUTO: 0.91 K/UL — LOW (ref 1–3.3)
LYMPHOCYTES # BLD AUTO: 7.9 % — LOW (ref 13–44)
MANUAL SMEAR VERIFICATION: SIGNIFICANT CHANGE UP
MCHC RBC-ENTMCNC: 21.9 PG — LOW (ref 27–34)
MCHC RBC-ENTMCNC: 22.7 PG — LOW (ref 27–34)
MCHC RBC-ENTMCNC: 29.9 G/DL — LOW (ref 32–36)
MCHC RBC-ENTMCNC: 30.3 G/DL — LOW (ref 32–36)
MCV RBC AUTO: 73.3 FL — LOW (ref 80–100)
MCV RBC AUTO: 75 FL — LOW (ref 80–100)
MONOCYTES # BLD AUTO: 1.53 K/UL — HIGH (ref 0–0.9)
MONOCYTES NFR BLD AUTO: 13.2 % — SIGNIFICANT CHANGE UP (ref 2–14)
NEUTROPHILS # BLD AUTO: 9.13 K/UL — HIGH (ref 1.8–7.4)
NEUTROPHILS NFR BLD AUTO: 78.9 % — HIGH (ref 43–77)
NITRITE UR-MCNC: NEGATIVE — SIGNIFICANT CHANGE UP
NRBC # BLD: 0 /100 WBCS — SIGNIFICANT CHANGE UP (ref 0–0)
OSMOLALITY SERPL: 282 MOSMOL/KG — SIGNIFICANT CHANGE UP (ref 275–300)
OSMOLALITY UR: 266 MOS/KG — LOW (ref 300–900)
PH UR: 6.5 — SIGNIFICANT CHANGE UP (ref 5–8)
PLAT MORPH BLD: NORMAL — SIGNIFICANT CHANGE UP
PLATELET # BLD AUTO: 323 K/UL — SIGNIFICANT CHANGE UP (ref 150–400)
PLATELET # BLD AUTO: 327 K/UL — SIGNIFICANT CHANGE UP (ref 150–400)
POIKILOCYTOSIS BLD QL AUTO: SLIGHT — SIGNIFICANT CHANGE UP
POLYCHROMASIA BLD QL SMEAR: SLIGHT — SIGNIFICANT CHANGE UP
POTASSIUM SERPL-MCNC: 3.3 MMOL/L — LOW (ref 3.5–5.3)
POTASSIUM SERPL-MCNC: 4.2 MMOL/L — SIGNIFICANT CHANGE UP (ref 3.5–5.3)
POTASSIUM SERPL-SCNC: 3.3 MMOL/L — LOW (ref 3.5–5.3)
POTASSIUM SERPL-SCNC: 4.2 MMOL/L — SIGNIFICANT CHANGE UP (ref 3.5–5.3)
PROCALCITONIN SERPL-MCNC: 0.32 NG/ML — HIGH (ref 0.02–0.1)
PROT SERPL-MCNC: 6.2 G/DL — SIGNIFICANT CHANGE UP (ref 6–8.3)
PROT UR-MCNC: NEGATIVE MG/DL — SIGNIFICANT CHANGE UP
PROTHROM AB SERPL-ACNC: 15.8 SEC — HIGH (ref 9.9–13.4)
RBC # BLD: 3.52 M/UL — LOW (ref 3.8–5.2)
RBC # BLD: 3.56 M/UL — LOW (ref 3.8–5.2)
RBC # FLD: 16.1 % — HIGH (ref 10.3–14.5)
RBC # FLD: 16.3 % — HIGH (ref 10.3–14.5)
RBC BLD AUTO: ABNORMAL
RBC CASTS # UR COMP ASSIST: 0 /HPF — SIGNIFICANT CHANGE UP (ref 0–4)
REVIEW: SIGNIFICANT CHANGE UP
RH IG SCN BLD-IMP: POSITIVE — SIGNIFICANT CHANGE UP
RSV RNA NPH QL NAA+NON-PROBE: SIGNIFICANT CHANGE UP
RSV RNA NPH QL NAA+NON-PROBE: SIGNIFICANT CHANGE UP
SARS-COV-2 RNA SPEC QL NAA+PROBE: DETECTED
SARS-COV-2 RNA SPEC QL NAA+PROBE: DETECTED
SODIUM SERPL-SCNC: 135 MMOL/L — SIGNIFICANT CHANGE UP (ref 135–145)
SODIUM SERPL-SCNC: 136 MMOL/L — SIGNIFICANT CHANGE UP (ref 135–145)
SODIUM UR-SCNC: 5 MMOL/L — SIGNIFICANT CHANGE UP
SP GR SPEC: 1.01 — SIGNIFICANT CHANGE UP (ref 1–1.03)
SQUAMOUS # UR AUTO: 5 /HPF — SIGNIFICANT CHANGE UP (ref 0–5)
TIBC SERPL-MCNC: 198 UG/DL — LOW (ref 220–430)
TROPONIN T, HIGH SENSITIVITY RESULT: <6 NG/L — SIGNIFICANT CHANGE UP (ref 0–51)
UIBC SERPL-MCNC: 191 UG/DL — SIGNIFICANT CHANGE UP (ref 110–370)
UROBILINOGEN FLD QL: 0.2 MG/DL — SIGNIFICANT CHANGE UP (ref 0.2–1)
UUN UR-MCNC: 506 MG/DL — SIGNIFICANT CHANGE UP
VIT B12 SERPL-MCNC: 737 PG/ML — SIGNIFICANT CHANGE UP (ref 232–1245)
WBC # BLD: 11.57 K/UL — HIGH (ref 3.8–10.5)
WBC # BLD: 11.87 K/UL — HIGH (ref 3.8–10.5)
WBC # FLD AUTO: 11.57 K/UL — HIGH (ref 3.8–10.5)
WBC # FLD AUTO: 11.87 K/UL — HIGH (ref 3.8–10.5)
WBC UR QL: 4 /HPF — SIGNIFICANT CHANGE UP (ref 0–5)

## 2024-12-14 PROCEDURE — 71045 X-RAY EXAM CHEST 1 VIEW: CPT | Mod: 26

## 2024-12-14 PROCEDURE — 99233 SBSQ HOSP IP/OBS HIGH 50: CPT

## 2024-12-14 PROCEDURE — 93010 ELECTROCARDIOGRAM REPORT: CPT

## 2024-12-14 PROCEDURE — 99222 1ST HOSP IP/OBS MODERATE 55: CPT

## 2024-12-14 PROCEDURE — 99233 SBSQ HOSP IP/OBS HIGH 50: CPT | Mod: GC

## 2024-12-14 RX ORDER — REMDESIVIR 100 MG/1
100 INJECTION, POWDER, LYOPHILIZED, FOR SOLUTION INTRAVENOUS EVERY 24 HOURS
Refills: 0 | Status: COMPLETED | OUTPATIENT
Start: 2024-12-15 | End: 2024-12-16

## 2024-12-14 RX ORDER — POTASSIUM CHLORIDE 600 MG/1
40 TABLET, EXTENDED RELEASE ORAL ONCE
Refills: 0 | Status: COMPLETED | OUTPATIENT
Start: 2024-12-14 | End: 2024-12-14

## 2024-12-14 RX ORDER — REMDESIVIR 100 MG/1
200 INJECTION, POWDER, LYOPHILIZED, FOR SOLUTION INTRAVENOUS EVERY 24 HOURS
Refills: 0 | Status: COMPLETED | OUTPATIENT
Start: 2024-12-14 | End: 2024-12-14

## 2024-12-14 RX ORDER — REMDESIVIR 100 MG/1
INJECTION, POWDER, LYOPHILIZED, FOR SOLUTION INTRAVENOUS
Refills: 0 | Status: COMPLETED | OUTPATIENT
Start: 2024-12-14 | End: 2024-12-16

## 2024-12-14 RX ORDER — GUAIFENESIN 400 MG
100 TABLET ORAL ONCE
Refills: 0 | Status: COMPLETED | OUTPATIENT
Start: 2024-12-14 | End: 2024-12-14

## 2024-12-14 RX ORDER — ACETAMINOPHEN, DIPHENHYDRAMINE HCL, PHENYLEPHRINE HCL 325; 25; 5 MG/1; MG/1; MG/1
3 TABLET ORAL ONCE
Refills: 0 | Status: COMPLETED | OUTPATIENT
Start: 2024-12-14 | End: 2024-12-14

## 2024-12-14 RX ORDER — ACETAMINOPHEN 500MG 500 MG/1
325 TABLET, COATED ORAL ONCE
Refills: 0 | Status: DISCONTINUED | OUTPATIENT
Start: 2024-12-14 | End: 2024-12-14

## 2024-12-14 RX ORDER — HEPARIN SODIUM,PORCINE 1000/ML
2800 VIAL (ML) INJECTION
Qty: 25000 | Refills: 0 | Status: DISCONTINUED | OUTPATIENT
Start: 2024-12-14 | End: 2024-12-15

## 2024-12-14 RX ORDER — GLUCOSAMINE SULFATE DIPOT CHLR 500 MG
1 CAPSULE ORAL DAILY
Refills: 0 | Status: DISCONTINUED | OUTPATIENT
Start: 2024-12-14 | End: 2024-12-18

## 2024-12-14 RX ORDER — BENZONATATE 100 MG/1
100 CAPSULE ORAL THREE TIMES A DAY
Refills: 0 | Status: DISCONTINUED | OUTPATIENT
Start: 2024-12-14 | End: 2024-12-18

## 2024-12-14 RX ADMIN — ACETAMINOPHEN 500MG 650 MILLIGRAM(S): 500 TABLET, COATED ORAL at 00:50

## 2024-12-14 RX ADMIN — BENZONATATE 100 MILLIGRAM(S): 100 CAPSULE ORAL at 09:14

## 2024-12-14 RX ADMIN — Medication 1 MILLIGRAM(S): at 10:48

## 2024-12-14 RX ADMIN — POTASSIUM CHLORIDE 40 MILLIEQUIVALENT(S): 600 TABLET, EXTENDED RELEASE ORAL at 09:14

## 2024-12-14 RX ADMIN — Medication 4000 UNIT(S): at 19:27

## 2024-12-14 RX ADMIN — Medication 4000 UNIT(S): at 10:56

## 2024-12-14 RX ADMIN — Medication 3000 UNIT(S)/HR: at 10:48

## 2024-12-14 RX ADMIN — Medication 2800 UNIT(S)/HR: at 03:39

## 2024-12-14 RX ADMIN — Medication 100 MILLIGRAM(S): at 05:25

## 2024-12-14 RX ADMIN — BENZONATATE 100 MILLIGRAM(S): 100 CAPSULE ORAL at 21:17

## 2024-12-14 RX ADMIN — ACETAMINOPHEN, DIPHENHYDRAMINE HCL, PHENYLEPHRINE HCL 3 MILLIGRAM(S): 325; 25; 5 TABLET ORAL at 22:44

## 2024-12-14 RX ADMIN — POTASSIUM CHLORIDE 40 MILLIEQUIVALENT(S): 600 TABLET, EXTENDED RELEASE ORAL at 10:48

## 2024-12-14 RX ADMIN — Medication 2800 UNIT(S)/HR: at 07:23

## 2024-12-14 RX ADMIN — Medication 3200 UNIT(S)/HR: at 21:22

## 2024-12-14 RX ADMIN — REMDESIVIR 200 MILLIGRAM(S): 100 INJECTION, POWDER, LYOPHILIZED, FOR SOLUTION INTRAVENOUS at 21:18

## 2024-12-14 RX ADMIN — Medication 3200 UNIT(S)/HR: at 19:02

## 2024-12-14 RX ADMIN — Medication 3200 UNIT(S)/HR: at 19:28

## 2024-12-14 NOTE — CHART NOTE - NSCHARTNOTEFT_GEN_A_CORE
Called by primary team to evaluate the patient for possible CICU admission and to review imaging results.     Patient had CT Angio Heart and Coronaries performed earlier today, which demonstrates: Thrombus associated with the distal tip of the port in the SVC extends into the right atrium. Thrombus measures up to 15 mm x 15 mm in cross section within the SVC and extends approximately 65 mm inferiorly into  the right atrial cavity.  Pulmonary embolus within the right lower lobe pulmonary artery with associated fairly large infarct in the right lower lobe.    On evaluation patient was resting comfortably with no active complaints. Above results were reviewed with the patient. Vitals reviewed, patient with SpO2s > 93% ORA, serial BPs WNL. Remains with sinus tachycardia. Patient with no clear indication fo CICU admission at this time, can continue monitoring at current level of care.    If patient with hypotension, escalating O2 requirements, or interval development of new symptoms please recall cardiology.    Case d/w Dr. Foster
MEDICINE PA    HPI: 39 yo F with recent diagnosis of breast cancer presented from [facility] for bilateral PE, transferred to [facility] for further eval.     Patient noted to be febrile to 100.9 overnight.   Patient seen and examined at bedside, A&Ox3, in no acute distress, denies any sore throat, chest pain, SOB, dizziness, lightheadedness, abd pain.   Physical exam: S1+S2 present; respirations unlabored, lungs CTAB; abd soft, nontender, nondistended; no BLE edema, 2+ pulses bilaterally.     - BCx x 2, UA, UCx ordered  - Patient noted to be positive for COVID-19 - placed on precautions.   - She endorsed mildly productive cough overnight, relieved by Robitussin x 1  - Will hold off on dexamethasone at this time given patient without hypoxia.  - Consider RDV (will endorse to primary team in AM for further follow-up)     VITAL SIGNS:  T(C): 37 (12-14-24 @ 04:47), Max: 38.3 (12-14-24 @ 00:17)  HR: 96 (12-14-24 @ 04:47) (96 - 117)  BP: 100/60 (12-14-24 @ 04:47) (100/60 - 141/69)  RR: 18 (12-14-24 @ 04:47) (18 - 18)  SpO2: 94% (12-14-24 @ 04:47) (92% - 95%)    Chris Cortez PA-C  Dept. of Medicine
Brief [facility] Event Note    This note is in reference to patient Linda Bartholomew (MRN [id number]) who is a 39 yo F with recently diagnosed breast CA and recently presented to an OSH with SOB and bleeding from he breast mass, found to have a possible RA mass and referred to [facility] for further work up and treatment.    The patient was referred to the [facility] lab to evaluate for possible intracardiac thrombus and to r/o any shunts in her heart. The patient came to the [facility] lab, while discussing the informed consent for the procedure the patient noted that she was not interested in the procedure as she was worried about the risk of bleeding. I explained the bleeding was very low risk and the test is important to complete her work up. Then Myself, Dr. Alvarez (MIKEY), Dr. Foster (vascular/structural), and Dr. Stiles (anesthesia) spoke to the patient to understand her concerns and see if there was something that could be explained better to make sure she understands. We discussed the procedures indications, risks, and benefits. The patient continued to state the test would make her bleed and she was not interested. We explained the risk of foregoing the procedure including the risk of worsening SOB, hypoxia, worsening PE clot burden, need for emergent cardiac surgery and death. The patient repeatedly stated that she was sure that there is no clot in her heart and the test is not necessary and she would only be willing to do a transthoracic echo. She appeared to be at her baseline mental status and with capacity at time of the discussion. Primary team and vascular team notified, will try to obtain cardiac CT vs CT venogram to better assess for thrombus burden.    Kolton Hand PGY6  Matthew Coats Wilson
MEDICINE PA    Patient with CT Angio Heart and Coronaries performed, which demonstrates "Thrombus associated with the distal tip of the port in the SVC extends into the right atrium. Thrombus measures up to 15 mm x 15 mm in cross section within the SVC and extends approximately 65 mm inferiorly into the right atrial cavity. Pulmonary embolus within the right lower lobe pulmonary artery with associated fairly large infarct in the right lower lobe. " Patient is resting comfortably, on room air, offers no acute complaints at this time. She denies any chest pain, SOB, dizziness, lightheadedness, palpitations, abd pain. Re thrombus in SVC extending into right atrium, house cards re-consulted and present at bedside overnight - no acute interventions recommended at this time; follow-up further recommendations. c/w heparin gtt, c/w tele monitoring. Will continue to monitor symptoms and vital signs. Will endorse above event to primary team in AM for further follow-up.     Vital Signs Last 24 Hrs  T(C): 37.4 (13 Dec 2024 20:07), Max: 37.4 (13 Dec 2024 20:07)  T(F): 99.4 (13 Dec 2024 20:07), Max: 99.4 (13 Dec 2024 20:07)  HR: 117 (13 Dec 2024 20:07) (98 - 120)  BP: 123/72 (13 Dec 2024 20:07) (102/65 - 141/69)  RR: 18 (13 Dec 2024 20:07) (16 - 18)  SpO2: 92% (13 Dec 2024 20:07) (92% - 98%)    O2 Parameters below as of 13 Dec 2024 20:07  Patient On (Oxygen Delivery Method): room air    Chris Cortez PA-C  Dept. of Medicine
MEDICINE PA    Patient with CT Angio Heart and Coronaries performed, which demonstrates:  Thrombus associated with the distal tip of the port in the SVC extends   into the right atrium. Thrombus measures up to 15 mm x 15 mm in cross   section within the SVC and extends approximately 65 mm inferiorly into   the right atrial cavity.     Pulmonary embolus within the right lower lobe pulmonary artery with   associated fairly large infarct in the right lower lobe.      Patient is resting comfortably, on room air, offers no acute complaints at this time. She denies any chest pain, SOB, dizziness, lightheadedness, palpitations, abd pain. Re thrombus in SVC extending into right atrium, house cards re-consulted and present at bedside overnight - no acute interventions recommended at this time; follow-up further recommendations. c/w heparin gtt, c/w tele monitoring. Will continue to monitor symptoms and vital signs. Will endorse above event to primary team in AM for further follow-up.     Vital Signs Last 24 Hrs  T(C): 37.4 (13 Dec 2024 20:07), Max: 37.4 (13 Dec 2024 20:07)  T(F): 99.4 (13 Dec 2024 20:07), Max: 99.4 (13 Dec 2024 20:07)  HR: 117 (13 Dec 2024 20:07) (98 - 120)  BP: 123/72 (13 Dec 2024 20:07) (102/65 - 141/69)  RR: 18 (13 Dec 2024 20:07) (16 - 18)  SpO2: 92% (13 Dec 2024 20:07) (92% - 98%)    O2 Parameters below as of 13 Dec 2024 20:07  Patient On (Oxygen Delivery Method): room air    Chris Cortez PA-C  Dept. of Medicine

## 2024-12-14 NOTE — PROGRESS NOTE ADULT - PROBLEM SELECTOR PLAN 3
Started chemo 12/5/24, has R port.   Follows Dr. Quezada outpatient.  [facility] consulted on admission. Case discussed with Oncology attending 12/13 , they rec prioritizing AC and vascular intervention, surgery evaluation for bleeding biopsy site, that outpatient oncologist Dr. Quezada agrees with plan for vascular intervention.  -  F/U on further Hem Onc recs.

## 2024-12-14 NOTE — CONSULT NOTE ADULT - ASSESSMENT
Patient is a 38 year old female with  newly diagnosed poorly differentiated breast ca, recently started neoadjuvant chemotherapy at [facility] presented to [facility] with complaint of R sided back pain with dyspnea , found to have  bilateral PE with possible clot in right atrium, transferred to [facility] for PERT evaluation. At the time of admission, patient states she has noticed some bleeding from left breast biopsy site, but denies any other acute symptoms. She states she was having back pain and dyspnea prior and that was the reason she went to [facility], but it is not resolved. On ROS, she denies headache, dizziness, fever, chills, chest pain, dyspnea, orthopnea, palpitations, abdominal pain, nausea, vomiting, gum bleeding, nose bleeding, hemoptysis blood in stool, hematuria, bruising, any new rash, swelling, weakness, numbness, difficulty ambulating, cough, dysphagia, congestion, hearing change, change in vision, tinnitus, or change in appetite.     ER/hospital course: Being managed for PE. Tmax 100.9 overnight, COVID +, ID asked to evaluate.      RVP COVID +  UA 4 WBC  Blood cx collected      # Fevers  # SARS COV 2 infection  # Breast CA on Chemotherapy started 12/05  # Bilateral PE      - Fevers likely associated with COVID infection  - Follow up blood cultures  - Mild disease; Can give short course Remdesivir given risk for progression  - Continue to trend CBC/ fever curve        All recommendations are tentative pending Attending Attestation.    Amor Gandhi MD, PGY-5  ID Fellow  [employer] Teams Preferred  After 5pm/weekends call [phone]   Patient is a 38 year old female with  newly diagnosed poorly differentiated breast ca, recently started neoadjuvant chemotherapy at [facility] presented to [facility] with complaint of R sided back pain with dyspnea , found to have  bilateral PE with possible clot in right atrium, transferred to [facility] for PERT evaluation. At the time of admission, patient states she has noticed some bleeding from left breast biopsy site, but denies any other acute symptoms. She states she was having back pain and dyspnea prior and that was the reason she went to [facility], but it is not resolved. On ROS, she denies headache, dizziness, fever, chills, chest pain, dyspnea, orthopnea, palpitations, abdominal pain, nausea, vomiting, gum bleeding, nose bleeding, hemoptysis blood in stool, hematuria, bruising, any new rash, swelling, weakness, numbness, difficulty ambulating, cough, dysphagia, congestion, hearing change, change in vision, tinnitus, or change in appetite.     ER/hospital course: Being managed for PE. Tmax 100.9 overnight, COVID +, ID asked to evaluate.      RVP COVID +  UA 4 WBC  Blood cx collected      # Fever  # SARS COV 2 infection  # Breast CA on Chemotherapy started 12/05  # Bilateral PE/RA clot  # Chemo-port in place      - Fevers likely associated with COVID infection; no apparent alternate infectious source, VTE   - RA clot associated with Port; IR evaluation  - Follow up blood cultures  - OK to monitor off antibiotics  - COVID with no hypoxia/respiratory symptoms; Can give short course Remdesivir given risk for progression  - Continue to trend CBC/ fever curve        All recommendations are tentative pending Attending Attestation.    Amor Gandhi MD, PGY-5  ID Fellow  [employer] Teams Preferred  After 5pm/weekends call [phone]   Patient is a 38 year old female with  newly diagnosed poorly differentiated breast ca, recently started neoadjuvant chemotherapy at [facility] presented to [facility] with complaint of R sided back pain with dyspnea , found to have  bilateral PE with possible clot in right atrium, transferred to [facility] for PERT evaluation. At the time of admission, patient states she has noticed some bleeding from left breast biopsy site, but denies any other acute symptoms. She states she was having back pain and dyspnea prior and that was the reason she went to [facility], but it is not resolved. On ROS, she denies headache, dizziness, fever, chills, chest pain, dyspnea, orthopnea, palpitations, abdominal pain, nausea, vomiting, gum bleeding, nose bleeding, hemoptysis blood in stool, hematuria, bruising, any new rash, swelling, weakness, numbness, difficulty ambulating, cough, dysphagia, congestion, hearing change, change in vision, tinnitus, or change in appetite.     ER/hospital course: Being managed for PE. Tmax 100.9 overnight, COVID +, ID asked to evaluate.      RVP COVID +  UA 4 WBC  Blood cx collected      # Fever  # SARS COV 2 infection  # Breast CA; suspected flammatory CA on chemotherapy   # Bilateral PE/RA clot/ RLL infarct  # Chemo-port in place      - Fevers likely associated with COVID infection vs PE, no apparent alternate infectious source  - RA clot associated with Port; IR evaluation  - Follow up blood cultures  - OK to monitor off antibiotics  - COVID with no hypoxia/respiratory symptoms; Can give short course Remdesivir X 3 days given risk for progression  - Continue to trend CBC/ fever curve  - Continue Local breast wound care         Case d/w Attending and Primary team.     Amor Gandhi MD, PGY-5  ID Fellow  [employer] Teams Preferred  After 5pm/weekends call [phone]

## 2024-12-14 NOTE — PROGRESS NOTE ADULT - ASSESSMENT
Patient is a 38 year old female with  newly diagnosed poorly differentiated breast ca, recently started neoadjuvant chemotherapy at [facility] presented to [facility] with complaint of R sided back pain with dyspnea , found to have  bilateral PE with possible clot in right atrium, transferred to [facility] for PERT evaluation.

## 2024-12-14 NOTE — PROGRESS NOTE ADULT - SUBJECTIVE AND OBJECTIVE BOX
Cardiology Progress Note  ------------------------------------------------------------------------------------------    SUBJECTIVE/EVENTS:  - CTA Heart / Coronaries showed thrombus 15mm x 15mm in the SVC that extends into the RA, associated w/ the distal tip of her SVC port. There are PE in the RLL w/ large RLL pulmonary lobe infarct.   - NAEO  - Tele: NSR / Sinus tach, HRs in the 80-110s    -------------------------------------------------------------------------------------------  ROS:  All review of systems is negative unless indicated above.   -------------------------------------------------------------------------------------------  VITALS:  T(F): 98.6 (12-14), Max: 100.9 (12-14)  HR: 96 (12-14) (96 - 117)  BP: 100/60 (12-14) (100/60 - 141/69)  RR: 18 (12-14)  SpO2: 94% (12-14)  I&O's Summary    ------------------------------------------------------------------------------------------  PHYSICAL EXAM:  GEN: NAD, sitting in bed  HEENT: NCAT, EOMI  CV: RRR, Ns1/s2, no m/r/g, JVP not elevated  RESP: CTA B/l, no w/r/r  ABD: soft, nt, nd  EXT: warm, 2+ pulses, no edema  SKIN: no visible lesions, rashes  NEURO: AAOx3, no focal deficits  PSYCH: Calm, cooperative  -------------------------------------------------------------------------------------------  LABS:                          8.0    11.87 )-----------( 327      ( 14 Dec 2024 07:03 )             26.4     12-14    136  |  99  |  12  ----------------------------<  185[H]  3.3[L]   |  22  |  0.68    Ca    8.5      14 Dec 2024 07:03  Phos  3.7     12-12  Mg     1.8     12-12    TPro  6.1  /  Alb  3.0[L]  /  TBili  0.4  /  DBili  x   /  AST  19  /  ALT  17  /  AlkPhos  69  12-12    PT/INR - ( 12 Dec 2024 18:27 )   PT: 13.9 sec;   INR: 1.22 ratio         PTT - ( 14 Dec 2024 02:00 )  PTT:72.9 sec  CARDIAC MARKERS ( 12 Dec 2024 18:27 )  <6 ng/L / x     / x     / x     / x     / x                -------------------------------------------------------------------------------------------  Meds:  acetaminophen     Tablet .. 650 milliGRAM(s) Oral every 6 hours PRN  heparin   Injectable 8000 Unit(s) IV Push every 6 hours PRN  heparin   Injectable 4000 Unit(s) IV Push every 6 hours PRN  heparin  Infusion. 2800 Unit(s)/Hr IV Continuous <Continuous>  potassium chloride    Tablet ER 40 milliEquivalent(s) Oral once    -------------------------------------------------------------------------------------------  Cardiovascular Diagnostic Testing:  CTA Chest 12/12 at [facility]  Filling defect in first order right lower lobe segmental branch   extending into the subsegmental lower lobe branches. Second order   pulmonary embolism in the left lower lobe subsegmental branch.    TTE 12/12/24   1. Technically difficulty study.   2. Left ventricular endocardium is not well visualized; however, the left ventricular systolic function appears grossly normal.   3. There is poor visualization of all the endocardial borders to determine the presence of wall motion abnormalities. Probably normal.   4. The right ventricle is not well visualized. Based on visual assessment, the right ventricle appears normal in size and right ventricular systolic function is normal.   5. Unable to exclude the presence of right atrium, superior or inferior vena cava mass.   6. No significant valvular disease within study limitations.   7. No prior echocardiogram is available for comparison.    CTA Heart/Coronaries 12/13/2024  Results:    Superior vena cava (SVC)  Right-sided port terminates in the SVC.    Low-attenuation structure originating from the distal tip of the port in   the SVC extends into the right atrium. The structure measures   approximately 15 mm x 15 mm in cross section within the SVC and extends   approximately 65 mm inferiorly into the right atrium. The low-attenuation   structure persists on delayed-phase imaging.    Chambers  The cardiac chambers are qualitatively normal in size.    No overt filling defect noted in the left atrial appendage.    Coronary arteries  This study was not protocoled for coronary artery assessment.    No coronary artery calcium noted.    There is no anomalous coronary artery origin.    Aorta  There is no thoracic aortic aneurysm.    Pericardium  No pericardial effusion noted.    Non-cardiac findings  Multiple lymph nodes/lesions are noted within the left breast/left   anterior chest wall/left axillary region. No hilar or mediastinal   adenopathy is noted. Pulmonary embolus is noted within the right lower   lobe pulmonary artery. Fairly large patchy opacity in the right lower   lobe represents pulmonary infarct. Linear opacities representing   atelectasis are noted in the left lower lobe. No pleural effusions are   noted. Degenerative changes of the spine are noted.    Impression:  Thrombus associated with the distal tip of the port in the SVC extends   into the right atrium. Thrombus measures up to 15 mm x 15 mm in cross   section within the SVC and extends approximately 65 mm inferiorly into   the right atrial cavity. Findings reviewed with Dr. Russ Foster on   12.13.2024 at 15:18 with Teams.    Pulmonary embolus within the right lower lobe pulmonary artery with   associated fairly large infarct in the right lower lobe.        -------------------------------------------------------------------------------------------  Assessment and Plan:   Ms. Bartholomew is a 37 yo F with recent diagnosis of breast cancer 11/7/2024 (told was curable with chemo and surgery, last chemo 5 days ago, has R port in place), presented from [facility] for bilateral PE, transferred to [facility] for further eval. PE is small and the patient has no current hemodynamic compromise, however CTA is showing likely catheter-associated thrombus in SVC/RA that could potentially embolize. The risk of this embolization must be weighed against the risk of bleeding into necrotic breast mass, as well as the risk of any further intervention such as thrombectomy. Given that the patient is currently HD stable, will favor monitoring on anticoagulation.     #bilateral PE   #RA clot associated w/ distal tip of SVC port  - continue heparin gtt with therapeutic PTT goal  - call PERT team if becomes hypotensive, consider further embolization of thrombus causing hemodynamically significant PE  - monitor on tele    *** Recommendations are preliminary until cosigned by the attending.    Yvan Barfield MD  Cardiology Fellow    For all New Consults and Questions:  [email or web address]   Login: [email or web address]

## 2024-12-14 NOTE — PROGRESS NOTE ADULT - PROBLEM SELECTOR PLAN 2
Febrile overnight Tmax 100.7 Dec 14.  COVID+  Check blood cx x2, urine cx.  Check stat CBC/diff, CMP, LDH, Procalcitonin, CPK, Troponin, CRP, Ferritin, D-dimer, PT/INR, PTT, EKG, CXR, A1c.  Not hypoxic, patient denies any symptoms.   ID team consulted > F/U on their eval and recs. Febrile overnight Tmax 100.7 Dec 14.  COVID+  Check blood cx x2, urine cx.  Check stat CBC/diff, CMP, LDH, Procalcitonin, CPK, Troponin, CRP, Ferritin, D-dimer, PT/INR, PTT, EKG, CXR, A1c.  Not hypoxic, patient denies any symptoms. start remdesivir.   ID team consulted > F/U on their eval and recs. Febrile overnight Tmax 100.9 Dec 14.  COVID+  Check blood cx x2, urine cx.  Check stat CBC/diff, CMP, LDH, Procalcitonin, CPK, Troponin, CRP, Ferritin, D-dimer, PT/INR, PTT, EKG, CXR, A1c.  Not hypoxic, patient denies any symptoms. start remdesivir.   ID team consulted > F/U on their eval and recs.

## 2024-12-14 NOTE — PROGRESS NOTE ADULT - SUBJECTIVE AND OBJECTIVE BOX
Subjective  Events of transfer last 24 hours were reviewed.  The patient had a Tmax of 100.9 overnight.  COVID positive.  She denies any cardiopulmonary complaints at rest.  Denies any lightheaded sensation, dizziness, palpitation, abdominal pain.  Continues to have drainage from her left breast which she notes is unchanged in nature.  No blood noted in her stool or urine.    Review of systems  14 point review of systems is otherwise unremarkable separate described above in the history of present illness    Physical examination  No apparent distress, alert and oriented x 3, appropriate affect  JVD is not elevated, supple, no lymphadenopathy  Clear to auscultation bilateral with no wheezing rhonchi crackles  Regular rate and rhythm with no murmur rub or gallop line positive bowel sound, soft, nontender, nondistended, no mass and guarding or rebound tenderness  No clubbing cyanosis or edema  Moving all extremities spontaneously  2+ DP/PT pulse  No focal deficits    Assessment/plan  Poorly differentiated breast cancer T4 N3, triple negative  --Large necrotic left breast mass  --Necrotic left subpectoral and left axillary lymph nodes  Low risk pulmonary embolism  Thrombus on Mediport extending into the right atrium  COVID positive    Discussed with patient and her mother is at her bedside her clinical state and findings on imaging studies today.  All questions and concerns about the patient's condition were reviewed.  Potential etiologies of the patient's clot was gone over.  The various treatment options were gone over in terms of management of her pulmonary embolism and thrombus on Mediport.  The potential acute/subacute/chronic risk of clot on the Mediport was gone over.  Explained to the patient the pros/cons of being treated in a conservative/medical fashion along with the pros/cons of mechanical thrombectomy of clot on Mediport.  Indications for mechanical thrombectomy were gone over.  At this time the patient wishes to be treated in a conservative/medical fashion.  Patient H&H has trended down from 9.1 on 12/12-7.8 on 12/14.  Continue to closely monitor for signs and symptoms of bleeding.  Her PTT has largely not been therapeutic.  Would recommend that the patient be transition to Lovenox.  Indications for Lovenox and benefits/risks of the medication were discussed with the patient and her mother.  When patient is ready for discharge anticoagulation therapy as per oncology team along with length of anticoagulation therapy treatment.    Findings and plan were discussed with the patient's medical team.

## 2024-12-14 NOTE — CONSULT NOTE ADULT - ATTENDING COMMENTS
Events that transpired leading to the patient's current hospitalization was gone over.  The patient's past medical history/surgical status family history social history was reviewed.  Agree with 14 point review of systems and physical examination as noted above.    Discussion was had with the patient and her mother who is at her bedside concerning findings on imaging studies.  There is concern that the patient had a pulmonary embolism and that there is thrombus potentially in her SVC which may be adherent to her Mediport.  Explained to the patient potential etiologies of her pulmonary embolism/SVC clot.  Reviewed what is a pulmonary embolism.  The various forms of a pulmonary embolism including low risk pulmonary embolism, intermediate risk pulmonary embolism high risk pulmonary embolism was gone over.  The associated signs and symptoms and the various treatment options were gone over.  At this time it was explained that she is a low risk pulmonary embolism and is recommended that she be treated in a conservative/medical fashion.  If there are any changes in the patient's clinical status including development of shortness of breath, lightheaded sensation, dizziness she was told to please immediately notify her medical team.  It was explained to the patient and her mother potential interventions that may need to take place if she was to become clinically unstable.    TTE was performed which was limited in nature to assess for clot in SVC/right atrium.  It is recommended that a MIKEY be performed.  Indications and details of the MIKEY reviewed with the patient and her mother.  Benefits and risks were explained.  Based upon findings on MIKEY in the patient's clinical state will determine if she may benefit from any type of catheter-based procedure for extraction of the clot and removal of Mediport.    Continue anticoagulation therapy/heparin with close monitoring of PTT.  Please try to maintain patient in a therapeutic range.  Closely monitor for signs and symptoms bleeding.  Concern for patient to have worsening bleeding from left breast core biopsy site.  Patient also at increased risk for bleeding into necrotic mass in left breast.  Would appreciate recommendations from oncology team to guide assistance in care of patient's anticoagulation therapy and need for more potential aggressive intervention considering her prognosis and recent administration of chemotherapeutic regimen.  Recommend patient be seen by surgery team.    Patient to obtain CT scan of the head to assess for metastasis.    Recommend bilateral lower and upper extremity venous duplex studies.    Continue telemetry monitoring.    All question concerns of the patient and her mother were addressed.    Findings and plan were discussed with CICU/Dr. Gant, cardiac surgery/Dr. Alex and the ED team.    90 minutes were spent on this encounter for extensive review of medical record details including labs and/or imaging studies and/or adjacent care team and consultant records, as well as review and reconciliation of current medications. Time was spent on obtaining a history, performing physical examination of patient, and answering patient and/or family questions regarding plan of care. Time was also spent discussing plan of care with patient’s other care team members including primary and consulting teams. Time also was spent on documentation of this encounter into the EHR.
38 year old female with  newly diagnosed poorly differentiated breast ca, recently started neoadjuvant chemotherapy at [facility] presented to [facility] with complaint of R sided back pain with dyspnea , found to have  bilateral PE with possible clot in right atrium, transferred to [facility] for PERT evaluation. At the time of admission, patient states she has noticed some bleeding from left breast biopsy site, but denies any other acute symptoms. She states she was having back pain and dyspnea prior and that was the reason she went to [facility], but it is not resolved. On ROS, she denies headache, dizziness, fever, chills, chest pain, dyspnea, orthopnea, palpitations, abdominal pain, nausea, vomiting, gum bleeding, nose bleeding, hemoptysis blood in stool, hematuria, bruising, any new rash, swelling, weakness, numbness, difficulty ambulating, cough, dysphagia, congestion, hearing change, change in vision, tinnitus, or change in appetite.     ER/hospital course: Being managed for PE. Tmax 100.9 overnight, COVID +, ID asked to evaluate.      RVP COVID +  UA 4 WBC  Blood cx collected      # Fever  # SARS COV 2 infection  # Leucocytosis   # Breast CA; suspected inflammatory CA on chemotherapy   # Bilateral PE/RA clot/ RLL infarct  # Chemo-port in place      - Fevers likely associated with COVID infection vs PE vs infarct, no apparent alternate infectious source  - RA clot associated with Port; IR evaluation  - Follow up blood cultures  - OK to monitor off antibiotics  - COVID with no hypoxia/respiratory symptoms; Can give short course Remdesivir X 3 days given risk for progression  - Continue to trend CBC/ fever curve  - Continue Local breast wound care       Jenn Chavarria  Please contact through MS Teams   If no response or past 5 pm/weekend call [phone].     My colleague will cover starting 12/15.

## 2024-12-14 NOTE — CONSULT NOTE ADULT - ASSESSMENT
Interventional Radiology    Evaluate for Procedure: Port removal    HPI: 38F hx of L breast ca s/p R port placement admitted w b/l PE and thrombus associated with the distal tip of the port in the SVC which extends into the right atrium. IR consulted for port removal.    Allergies: No Known Allergies    Medications (Abx/Cardiac/Anticoagulation/Blood Products)  heparin  Infusion.: 2800 Unit(s)/Hr IV Continuous (12-13 @ 19:21)  heparin  Infusion.: 2800 Unit(s)/Hr IV Continuous (12-14 @ 03:40)    Data:  170.2  96.2  T(C): 37  HR: 96  BP: 100/60  RR: 18  SpO2: 94%    -WBC 11.87 / HgB 8.0 / Hct 26.4 / Plt 327  -Na 136 / Cl 99 / BUN 12 / Glucose 185  -K 3.3 / CO2 22 / Cr 0.68  -INR -- / PTT 72.9      Radiology: reviewed    Assessment/Plan:   38F hx of L breast ca s/p R port placement admitted w b/l PE and thrombus associated with the distal tip of the port in the SVC which extends into the right atrium. IR consulted for port removal.  Catheter associated thrombus should be treated with AC. Port present should not preclude PE intervention. No indication to remove port at this time. Please reconsult if needed.

## 2024-12-14 NOTE — PROGRESS NOTE ADULT - PROBLEM SELECTOR PLAN 1
#bilateral PE   #RA clot  #nonhealing breast wound/drainage.    - AC: patient arrived to [facility] ED on heparin gtt (discussed with clinical pharmacist, continue heparin gtt as previously ordered).  - patient was planned for MIKEY 12/13 am, however she refused. Now plan for cardiac CT.  - 12/13: GOC discussion documented by vascular team and also as noted in admission note with me, ?that patient has very poor insight?, needs repeat GOC risk/benefit discussions. SW consulted, consider psych eval.   - CTA showing likely catheter-associated thrombus in SVC/RA that could potentially embolize.  - Patient may be willing to get MIKEY done now, she wants to discuss with vascular team again 12/14.   >       > F/U vascular cardiology and surgery teams for further recs ?plan for thrombectomy/embolization?  - BL LE duplex US: No evidence of deep venous thrombosis in either lower extremity.   - Case discussed with breast surgery attending and they rec local wound care as of 12/13 and 12/14 and that patient needs to follow up with outpatient Dr. Mack Major.  - IR consulted regarding question of mediport/alternative access for chemo.    - call cardiology/PERT team if becomes hypotensive, may need TPA at that point.  - daily bmp for Mg>2, K>4.  - Telemetry. #bilateral PE   #RA clot  #nonhealing breast wound/drainage.    - AC: patient arrived to [facility] ED on heparin gtt (discussed with clinical pharmacist, continue heparin gtt as previously ordered).  - patient was planned for MIKEY 12/13 am, however she refused. Now plan for cardiac CT.  - 12/13: GOC discussion documented by vascular team and also as noted in admission note with me, ?that patient has very poor insight?, needs repeat GOC risk/benefit discussions. SW consulted, consider psych eval.   - CTA showing likely catheter-associated thrombus in SVC/RA that could potentially embolize.  - Patient may be willing to get MIKEY done now, she wants to discuss with vascular team again 12/14.   >       > F/U vascular cardiology and surgery teams for further recs ?plan for thrombectomy/embolization?  > 12/14: patient has now opted for conservative/medical approach.  - BL LE duplex US: No evidence of deep venous thrombosis in either lower extremity.   - Case discussed with breast surgery attending and they rec local wound care as of 12/13 and 12/14 and that patient needs to follow up with outpatient Dr. Mack Major.  - IR consulted regarding question of mediport/alternative access for chemo.    - call cardiology/PERT team if becomes hypotensive, may need TPA at that point.  - daily bmp for Mg>2, K>4.  - Telemetry.

## 2024-12-14 NOTE — CONSULT NOTE ADULT - SUBJECTIVE AND OBJECTIVE BOX
Patient is a 38y old  Female who presents with a chief complaint of PE (14 Dec 2024 12:22)    HPI:  Patient is a 38 year old female with  newly diagnosed poorly differentiated breast ca, recently started neoadjuvant chemotherapy at [facility] presented to [facility] with complaint of R sided back pain with dyspnea , found to have  bilateral PE with possible clot in right atrium, transferred to [facility] for PERT evaluation. At the time of admission, patient states she has noticed some bleeding from left breast biopsy site, but denies any other acute symptoms. She states she was having back pain and dyspnea prior and that was the reason she went to [facility], but it is not resolved. On ROS, she denies headache, dizziness, fever, chills, chest pain, dyspnea, orthopnea, palpitations, abdominal pain, nausea, vomiting, gum bleeding, nose bleeding, hemoptysis blood in stool, hematuria, bruising, any new rash, swelling, weakness, numbness, difficulty ambulating, cough, dysphagia, congestion, hearing change, change in vision, tinnitus, or change in appetite.     ER/hospital course: Being managed for PE. Tmax 100.9 overnight, COVID +, ID asked to evaluate.      RVP COVID +  UA 4 WBC  Blood cx collected      prior hospital charts reviewed [ x ]  primary team notes reviewed [ x ]  other consultant notes reviewed [ x ]    PAST MEDICAL & SURGICAL HISTORY:  Breast mass          Allergies  No Known Allergies    ANTIMICROBIALS (past 90 days)  MEDICATIONS  (STANDING):          MEDICATIONS  (STANDING):  acetaminophen     Tablet .. 650 every 6 hours PRN  benzonatate 100 three times a day PRN  heparin   Injectable 8000 every 6 hours PRN  heparin   Injectable 4000 every 6 hours PRN  heparin  Infusion. 2800 <Continuous>    SOCIAL HISTORY:       FAMILY HISTORY:    REVIEW OF SYSTEMS  [  ] ROS unobtainable because:    [  ] All other systems negative except as noted below:	    Constitutional:  [ ] fever [ ] chills  [ ] weight loss  [ ] weakness  Skin:  [ ] rash [ ] phlebitis	  Eyes: [ ] icterus [ ] pain  [ ] discharge	  ENMT: [ ] sore throat  [ ] thrush [ ] ulcers [ ] exudates  Respiratory: [ ] dyspnea [ ] hemoptysis [ ] cough [ ] sputum	  Cardiovascular:  [ ] chest pain [ ] palpitations [ ] edema	  Gastrointestinal:  [ ] nausea [ ] vomiting [ ] diarrhea [ ] constipation [ ] pain	  Genitourinary:  [ ] dysuria [ ] frequency [ ] hematuria [ ] discharge [ ] flank pain  [ ] incontinence  Musculoskeletal:  [ ] myalgias [ ] arthralgias [ ] arthritis  [ ] back pain  Neurological:  [ ] headache [ ] seizures  [ ] confusion/altered mental status  Psychiatric:  [ ] anxiety [ ] depression	  Hematology/Lymphatics:  [ ] lymphadenopathy  Endocrine:  [ ] adrenal [ ] thyroid  Allergic/Immunologic:	 [ ] transplant [ ] seasonal    Vital Signs Last 24 Hrs  T(F): 98.6 (12-14-24 @ 04:47), Max: 100.9 (12-14-24 @ 00:17)  Vital Signs Last 24 Hrs  HR: 96 (12-14-24 @ 04:47) (96 - 117)  BP: 100/60 (12-14-24 @ 04:47) (100/60 - 123/72)  RR: 18 (12-14-24 @ 04:47)  SpO2: 94% (12-14-24 @ 04:47) (92% - 94%)  Wt(kg): --    PHYSICAL EXAM:                              7.8    11.57 )-----------( 323      ( 14 Dec 2024 12:21 )             26.1   12-14    135  |  98  |  8   ----------------------------<  148[H]  4.2   |  21[L]  |  0.51    Ca    8.5      14 Dec 2024 12:22  Phos  3.7     12-12  Mg     1.8     12-12    TPro  6.2  /  Alb  2.5[L]  /  TBili  0.3  /  DBili  x   /  AST  18  /  ALT  18  /  AlkPhos  90  12-14    Urinalysis Basic - ( 14 Dec 2024 12:22 )    Color: x / Appearance: x / SG: x / pH: x  Gluc: 148 mg/dL / Ketone: x  / Bili: x / Urobili: x   Blood: x / Protein: x / Nitrite: x   Leuk Esterase: x / RBC: x / WBC x   Sq Epi: x / Non Sq Epi: x / Bacteria: x    MICROBIOLOGY:              RADIOLOGY:  imaging below personally reviewed and agree with findings    < from: Xray Chest 1 View- PORTABLE-Urgent (Xray Chest 1 View- PORTABLE-Urgent .) (12.14.24 @ 11:31) >    ACC: [id number] EXAM:  XR CHEST PORTABLE URGENT 1V   ORDERED BY: PHILIPP VAZQUEZ     PROCEDURE DATE:  12/14/2024          INTERPRETATION:  CLINICAL INDICATION: Fever, COVID    EXAM: Frontal radiograph of the chest.    COMPARISON: None    FINDINGS:  Right chest port with tip in the SVC.  Hazy opacities bilateral lung bases.  There is no pleural effusion or pneumothorax.  The heart size is not well evaluated in this position  No acute osseous abnormality.    IMPRESSION:  Hazy opacities bilateral lung bases may represent atelectasis. Infection   is not excluded.    < end of copied text >   Patient is a 38y old  Female who presents with a chief complaint of PE (14 Dec 2024 12:22)    HPI:  Patient is a 38 year old female with  newly diagnosed poorly differentiated breast ca, recently started neoadjuvant chemotherapy at [facility] presented to [facility] with complaint of R sided flank pain with dyspnea , found to have  bilateral PE with possible clot in right atrium, transferred to [facility] for PERT evaluation. At the time of admission, patient states she has noticed some bleeding from left breast biopsy site, but denies any other acute symptoms. She states she was having back pain and dyspnea prior and that was the reason she went to [facility], but it is not resolved. On ROS, she denies headache, dizziness, fever, chills, chest pain, dyspnea, orthopnea, palpitations, abdominal pain, nausea, vomiting, gum bleeding, nose bleeding, hemoptysis blood in stool, hematuria, bruising, any new rash, swelling, weakness, numbness, difficulty ambulating, cough, dysphagia, congestion, hearing change, change in vision, tinnitus, or change in appetite.     ER/hospital course: Being managed for PE. Tmax 100.9 overnight, COVID +, ID asked to evaluate.      RVP COVID +  UA 4 WBC  Blood cx collected      prior hospital charts reviewed [ x ]  primary team notes reviewed [ x ]  other consultant notes reviewed [ x ]    PAST MEDICAL & SURGICAL HISTORY:  Breast mass          Allergies  No Known Allergies    ANTIMICROBIALS (past 90 days)  MEDICATIONS  (STANDING):          MEDICATIONS  (STANDING):  acetaminophen     Tablet .. 650 every 6 hours PRN  benzonatate 100 three times a day PRN  heparin   Injectable 8000 every 6 hours PRN  heparin   Injectable 4000 every 6 hours PRN  heparin  Infusion. 2800 <Continuous>    SOCIAL HISTORY:       FAMILY HISTORY:    REVIEW OF SYSTEMS  [  ] ROS unobtainable because:    [  ] All other systems negative except as noted below:	    Constitutional:  [ ] fever [ ] chills  [ ] weight loss  [ ] weakness  Skin:  [ ] rash [ ] phlebitis	  Eyes: [ ] icterus [ ] pain  [ ] discharge	  ENMT: [ ] sore throat  [ ] thrush [ ] ulcers [ ] exudates  Respiratory: [ ] dyspnea [ ] hemoptysis [ ] cough [ ] sputum	  Cardiovascular:  [ ] chest pain [ ] palpitations [ ] edema	  Gastrointestinal:  [ ] nausea [ ] vomiting [ ] diarrhea [ ] constipation [ ] pain	  Genitourinary:  [ ] dysuria [ ] frequency [ ] hematuria [ ] discharge [ ] flank pain  [ ] incontinence  Musculoskeletal:  [ ] myalgias [ ] arthralgias [ ] arthritis  [ ] back pain  Neurological:  [ ] headache [ ] seizures  [ ] confusion/altered mental status  Psychiatric:  [ ] anxiety [ ] depression	  Hematology/Lymphatics:  [ ] lymphadenopathy  Endocrine:  [ ] adrenal [ ] thyroid  Allergic/Immunologic:	 [ ] transplant [ ] seasonal    Vital Signs Last 24 Hrs  T(F): 98.6 (12-14-24 @ 04:47), Max: 100.9 (12-14-24 @ 00:17)  Vital Signs Last 24 Hrs  HR: 96 (12-14-24 @ 04:47) (96 - 117)  BP: 100/60 (12-14-24 @ 04:47) (100/60 - 123/72)  RR: 18 (12-14-24 @ 04:47)  SpO2: 94% (12-14-24 @ 04:47) (92% - 94%)  Wt(kg): --    PHYSICAL EXAM:    General: Patient in NAD  HEENT: NCAT, EOMI, PERRL, no oral lesions  CV: S1+S2, no m/r/g appreciated   Lungs: No respiratory distress, CTAB  Abd: Soft, nontender, no guarding, no rebound tenderness, + bowel sounds   : No suprapubic tenderness  Neuro: Alert and oriented to time, place and person. Moves all extremities against gravity.  Ext: No cyanosis, no edema  Skin: No rash, no phlebitis, Port site clean                              7.8    11.57 )-----------( 323      ( 14 Dec 2024 12:21 )             26.1   12-14    135  |  98  |  8   ----------------------------<  148[H]  4.2   |  21[L]  |  0.51    Ca    8.5      14 Dec 2024 12:22  Phos  3.7     12-12  Mg     1.8     12-12    TPro  6.2  /  Alb  2.5[L]  /  TBili  0.3  /  DBili  x   /  AST  18  /  ALT  18  /  AlkPhos  90  12-14    Urinalysis Basic - ( 14 Dec 2024 12:22 )    Color: x / Appearance: x / SG: x / pH: x  Gluc: 148 mg/dL / Ketone: x  / Bili: x / Urobili: x   Blood: x / Protein: x / Nitrite: x   Leuk Esterase: x / RBC: x / WBC x   Sq Epi: x / Non Sq Epi: x / Bacteria: x    MICROBIOLOGY:              RADIOLOGY:  imaging below personally reviewed and agree with findings    < from: Xray Chest 1 View- PORTABLE-Urgent (Xray Chest 1 View- PORTABLE-Urgent .) (12.14.24 @ 11:31) >    ACC: [id number] EXAM:  XR CHEST PORTABLE URGENT 1V   ORDERED BY: PHILIPP VAZQUEZ     PROCEDURE DATE:  12/14/2024          INTERPRETATION:  CLINICAL INDICATION: Fever, COVID    EXAM: Frontal radiograph of the chest.    COMPARISON: None    FINDINGS:  Right chest port with tip in the SVC.  Hazy opacities bilateral lung bases.  There is no pleural effusion or pneumothorax.  The heart size is not well evaluated in this position  No acute osseous abnormality.    IMPRESSION:  Hazy opacities bilateral lung bases may represent atelectasis. Infection   is not excluded.      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    Procedure:  Cardiac CT    Scanner:  Aidee Aqu[employer] One    Acquisitions:  1.  Non-contrast prospective ECG-gated CT heart  2.  Contrast-enhanced ECG-gated CT heart  3.  Delayed-phase (1 minute) ECG-gated CT heart  4.  Delayed-phase (2 minutes) ECG-gated CT heart    Contrast:  63 mL Omnipaque 350    Complications:  None    Heart rate:  98 bpm    Quality:  Fair (increased image noise)    Post-processing:  Images analyzed with Vitrea software.    Comparison:  CT angiography chest 12.12.2024    Results:    Superior vena cava (SVC)  Right-sided port terminates in the SVC.    Low-attenuation structure originating from the distal tip of the port in   the SVC extends into the right atrium. The structure measures   approximately 15 mm x 15 mm in cross section within the SVC and extends   approximately 65 mm inferiorly into the right atrium. The low-attenuation   structure persists on delayed-phase imaging.    Chambers  The cardiac chambers are qualitatively normal in size.    No overt filling defect noted in the left atrial appendage.    Coronary arteries  This study was not protocoled for coronary artery assessment.    No coronary artery calcium noted.    There is no anomalous coronary artery origin.    Aorta  There is no thoracic aortic aneurysm.    Pericardium  No pericardial effusion noted.    Non-cardiac findings  Multiple lymph nodes/lesions are noted within the left breast/left   anterior chest wall/left axillary region. No hilar or mediastinal   adenopathy is noted. Pulmonary embolus is noted within the right lower   lobe pulmonary artery. Fairly large patchy opacity in the right lower   lobe represents pulmonary infarct. Linear opacities representing   atelectasis are noted in the left lower lobe. No pleural effusions are   noted. Degenerative changes of the spine are noted.    Impression:  Thrombus associated with the distal tip of the port in the SVC extends   into the right atrium. Thrombus measures up to 15 mm x 15 mm in cross   section within the SVC and extends approximately 65 mm inferiorly into   the right atrial cavity. Findings reviewed with Dr. Russ Foster on   12.13.2024 at 15:18 with Teams.    Pulmonary embolus within the right lower lobe pulmonary artery with   associated fairly large infarct in the right lower lobe.    --- End of Report ---         Patient is a 38y old  Female who presents with a chief complaint of PE (14 Dec 2024 12:22)    HPI:  Patient is a 38 year old female with  newly diagnosed poorly differentiated breast ca, recently started neoadjuvant chemotherapy at [facility] presented to [facility] with complaint of R sided flank pain with dyspnea , found to have  bilateral PE with possible clot in right atrium, transferred to [facility] for PERT evaluation. At the time of admission, patient states she has noticed some bleeding from left breast biopsy site, but denies any other acute symptoms. She states she was having back pain and dyspnea prior and that was the reason she went to [facility], but it is not resolved. On ROS, she denies headache, dizziness, fever, chills, chest pain, dyspnea, orthopnea, palpitations, abdominal pain, nausea, vomiting, gum bleeding, nose bleeding, hemoptysis blood in stool, hematuria, bruising, any new rash, swelling, weakness, numbness, difficulty ambulating, cough, dysphagia, congestion, hearing change, change in vision, tinnitus, or change in appetite.     ER/hospital course: Being managed for PE. Tmax 100.9 overnight, COVID +, ID asked to evaluate.      RVP COVID +  UA 4 WBC  Blood cx collected      prior hospital charts reviewed [ x ]  primary team notes reviewed [ x ]  other consultant notes reviewed [ x ]    PAST MEDICAL & SURGICAL HISTORY:  Breast mass          Allergies  No Known Allergies    ANTIMICROBIALS (past 90 days)  MEDICATIONS  (STANDING):          MEDICATIONS  (STANDING):  acetaminophen     Tablet .. 650 every 6 hours PRN  benzonatate 100 three times a day PRN  heparin   Injectable 8000 every 6 hours PRN  heparin   Injectable 4000 every 6 hours PRN  heparin  Infusion. 2800 <Continuous>    SOCIAL HISTORY: Lives with family, no active tobacco ETOH use    FAMILY HISTORY: No history of cancers in first degree relatives    REVIEW OF SYSTEMS  [  ] ROS unobtainable because:    [ x ] All other systems negative except as noted below:	    Constitutional:  [ ] fever [ ] chills  [ ] weight loss  [ ] weakness  Skin:  [ ] rash [ ] phlebitis	  Eyes: [ ] icterus [ ] pain  [ ] discharge	  ENMT: [ ] sore throat  [ ] thrush [ ] ulcers [ ] exudates  Respiratory: [ ] dyspnea [ ] hemoptysis [ ] cough [ ] sputum	  Cardiovascular:  [ ] chest pain [ ] palpitations [ ] edema	  Gastrointestinal:  [ ] nausea [ ] vomiting [ ] diarrhea [ ] constipation [ ] pain	  Genitourinary:  [ ] dysuria [ ] frequency [ ] hematuria [ ] discharge [ ] flank pain  [ ] incontinence  Musculoskeletal:  [ ] myalgias [ ] arthralgias [ ] arthritis  [ ] back pain  Neurological:  [ ] headache [ ] seizures  [ ] confusion/altered mental status  Psychiatric:  [ ] anxiety [ ] depression	  Hematology/Lymphatics:  [ ] lymphadenopathy  Endocrine:  [ ] adrenal [ ] thyroid  Allergic/Immunologic:	 [ ] transplant [ ] seasonal    Vital Signs Last 24 Hrs  T(F): 98.6 (12-14-24 @ 04:47), Max: 100.9 (12-14-24 @ 00:17)  Vital Signs Last 24 Hrs  HR: 96 (12-14-24 @ 04:47) (96 - 117)  BP: 100/60 (12-14-24 @ 04:47) (100/60 - 123/72)  RR: 18 (12-14-24 @ 04:47)  SpO2: 94% (12-14-24 @ 04:47) (92% - 94%)  Wt(kg): --    PHYSICAL EXAM:    General: Patient in NAD  HEENT: NCAT, EOMI, PERRL, no oral lesions  CV: S1+S2, no m/r/g appreciated   Lungs: No respiratory distress, CTAB  Abd: Soft, nontender, no guarding, no rebound tenderness, + bowel sounds   : No suprapubic tenderness  Neuro: Alert and oriented to time, place and person. Moves all extremities against gravity.  Ext: No cyanosis, no edema  Skin: No rash, no phlebitis, Port site clean                              7.8    11.57 )-----------( 323      ( 14 Dec 2024 12:21 )             26.1   12-14    135  |  98  |  8   ----------------------------<  148[H]  4.2   |  21[L]  |  0.51    Ca    8.5      14 Dec 2024 12:22  Phos  3.7     12-12  Mg     1.8     12-12    TPro  6.2  /  Alb  2.5[L]  /  TBili  0.3  /  DBili  x   /  AST  18  /  ALT  18  /  AlkPhos  90  12-14    Urinalysis Basic - ( 14 Dec 2024 12:22 )    Color: x / Appearance: x / SG: x / pH: x  Gluc: 148 mg/dL / Ketone: x  / Bili: x / Urobili: x   Blood: x / Protein: x / Nitrite: x   Leuk Esterase: x / RBC: x / WBC x   Sq Epi: x / Non Sq Epi: x / Bacteria: x    MICROBIOLOGY:              RADIOLOGY:  imaging below personally reviewed and agree with findings    < from: Xray Chest 1 View- PORTABLE-Urgent (Xray Chest 1 View- PORTABLE-Urgent .) (12.14.24 @ 11:31) >    ACC: [id number] EXAM:  XR CHEST PORTABLE URGENT 1V   ORDERED BY: PHILIPP VAZQUEZ     PROCEDURE DATE:  12/14/2024          INTERPRETATION:  CLINICAL INDICATION: Fever, COVID    EXAM: Frontal radiograph of the chest.    COMPARISON: None    FINDINGS:  Right chest port with tip in the SVC.  Hazy opacities bilateral lung bases.  There is no pleural effusion or pneumothorax.  The heart size is not well evaluated in this position  No acute osseous abnormality.    IMPRESSION:  Hazy opacities bilateral lung bases may represent atelectasis. Infection   is not excluded.      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    Procedure:  Cardiac CT    Scanner:  Aidee Aqu[employer] One    Acquisitions:  1.  Non-contrast prospective ECG-gated CT heart  2.  Contrast-enhanced ECG-gated CT heart  3.  Delayed-phase (1 minute) ECG-gated CT heart  4.  Delayed-phase (2 minutes) ECG-gated CT heart    Contrast:  63 mL Omnipaque 350    Complications:  None    Heart rate:  98 bpm    Quality:  Fair (increased image noise)    Post-processing:  Images analyzed with Vitrea software.    Comparison:  CT angiography chest 12.12.2024    Results:    Superior vena cava (SVC)  Right-sided port terminates in the SVC.    Low-attenuation structure originating from the distal tip of the port in   the SVC extends into the right atrium. The structure measures   approximately 15 mm x 15 mm in cross section within the SVC and extends   approximately 65 mm inferiorly into the right atrium. The low-attenuation   structure persists on delayed-phase imaging.    Chambers  The cardiac chambers are qualitatively normal in size.    No overt filling defect noted in the left atrial appendage.    Coronary arteries  This study was not protocoled for coronary artery assessment.    No coronary artery calcium noted.    There is no anomalous coronary artery origin.    Aorta  There is no thoracic aortic aneurysm.    Pericardium  No pericardial effusion noted.    Non-cardiac findings  Multiple lymph nodes/lesions are noted within the left breast/left   anterior chest wall/left axillary region. No hilar or mediastinal   adenopathy is noted. Pulmonary embolus is noted within the right lower   lobe pulmonary artery. Fairly large patchy opacity in the right lower   lobe represents pulmonary infarct. Linear opacities representing   atelectasis are noted in the left lower lobe. No pleural effusions are   noted. Degenerative changes of the spine are noted.    Impression:  Thrombus associated with the distal tip of the port in the SVC extends   into the right atrium. Thrombus measures up to 15 mm x 15 mm in cross   section within the SVC and extends approximately 65 mm inferiorly into   the right atrial cavity. Findings reviewed with Dr. Russ Foster on   12.13.2024 at 15:18 with Teams.    Pulmonary embolus within the right lower lobe pulmonary artery with   associated fairly large infarct in the right lower lobe.    --- End of Report ---         Patient is a 38y old  Female who presents with a chief complaint of PE (14 Dec 2024 12:22)    HPI:  Patient is a 38 year old female with  newly diagnosed poorly differentiated breast ca, recently started neoadjuvant chemotherapy at [facility] presented to [facility] with complaint of R sided flank pain with dyspnea , found to have  bilateral PE with possible clot in right atrium, transferred to [facility] for PERT evaluation. At the time of admission, patient states she has noticed some bleeding from left breast biopsy site, but denies any other acute symptoms. She states she was having back pain and dyspnea prior and that was the reason she went to [facility], but it is not resolved. On ROS, she denies headache, dizziness, fever, chills, chest pain, dyspnea, orthopnea, palpitations, abdominal pain, nausea, vomiting, gum bleeding, nose bleeding, hemoptysis blood in stool, hematuria, bruising, any new rash, swelling, weakness, numbness, difficulty ambulating, cough, dysphagia, congestion, hearing change, change in vision, tinnitus, or change in appetite.     ER/hospital course: Being managed for PE. Tmax 100.9 overnight, COVID +, ID asked to evaluate.      RVP COVID +  UA 4 WBC  Blood cx collected      prior hospital charts reviewed [ x ]  primary team notes reviewed [ x ]  other consultant notes reviewed [ x ]      PAST MEDICAL & SURGICAL HISTORY:  Breast mass          Allergies  No Known Allergies    ANTIMICROBIALS (past 90 days)  MEDICATIONS  (STANDING):          MEDICATIONS  (STANDING):  acetaminophen     Tablet .. 650 every 6 hours PRN  benzonatate 100 three times a day PRN  heparin   Injectable 8000 every 6 hours PRN  heparin   Injectable 4000 every 6 hours PRN  heparin  Infusion. 2800 <Continuous>      SOCIAL HISTORY: Lives with family, no active tobacco ETOH use      FAMILY HISTORY: No history of cancers in first degree relatives      REVIEW OF SYSTEMS  [  ] ROS unobtainable because:    [ x ] All other systems negative except as noted below:	    Constitutional:  [ x] fever [ ] chills    Skin:  [ ] rash [ ] phlebitis	  Eyes: [ ] icterus [ ] pain  [ ] discharge	  ENMT: [ ] sore throat  [ ] thrush  Respiratory: [ ] dyspnea [x ] cough [ ] sputum	  Cardiovascular:  [ ] chest pain 	  Gastrointestinal:  [ ] nausea [ ] vomiting [ ] diarrhea [ ] constipation [ ] pain	  Genitourinary:  [ ] dysuria [ ] frequency   Musculoskeletal:  [ ] myalgias  [ ] back pain  Neurological:  [ ] headache  [ ] confusion/altered mental status  Endocrine:  [ ] adrenal [ ] thyroid  Allergic/Immunologic:	 [ ] transplant [ ] seasonal          Vital Signs Last 24 Hrs  T(F): 98.6 (12-14-24 @ 04:47), Max: 100.9 (12-14-24 @ 00:17)  Vital Signs Last 24 Hrs  HR: 96 (12-14-24 @ 04:47) (96 - 117)  BP: 100/60 (12-14-24 @ 04:47) (100/60 - 123/72)  RR: 18 (12-14-24 @ 04:47)  SpO2: 94% (12-14-24 @ 04:47) (92% - 94%)  Wt(kg): --      PHYSICAL EXAM:    General: Patient in NAD  EYES: no discharge   ENT: no oral lesions  CV: S1+S2, normal  Lungs: No respiratory distress, lt breast swelling, induration, serous discharge   Abd: Soft, nontender,   : No suprapubic tenderness  Neuro: Alert and oriented, no new focal deficits  Ext: No edema  Skin: No rash, Port site clean                              7.8    11.57 )-----------( 323      ( 14 Dec 2024 12:21 )             26.1   12-14    135  |  98  |  8   ----------------------------<  148[H]  4.2   |  21[L]  |  0.51    Ca    8.5      14 Dec 2024 12:22  Phos  3.7     12-12  Mg     1.8     12-12    TPro  6.2  /  Alb  2.5[L]  /  TBili  0.3  /  DBili  x   /  AST  18  /  ALT  18  /  AlkPhos  90  12-14    Urinalysis Basic - ( 14 Dec 2024 12:22 )    Color: x / Appearance: x / SG: x / pH: x  Gluc: 148 mg/dL / Ketone: x  / Bili: x / Urobili: x   Blood: x / Protein: x / Nitrite: x   Leuk Esterase: x / RBC: x / WBC x   Sq Epi: x / Non Sq Epi: x / Bacteria: x    MICROBIOLOGY:        RADIOLOGY:  imaging below personally reviewed and agree with findings      < from: Xray Chest 1 View- PORTABLE-Urgent (Xray Chest 1 View- PORTABLE-Urgent .) (12.14.24 @ 11:31) >    ACC: [id number] EXAM:  XR CHEST PORTABLE URGENT 1V   ORDERED BY: PHILIPP VAZQUEZ     PROCEDURE DATE:  12/14/2024          INTERPRETATION:  CLINICAL INDICATION: Fever, COVID    EXAM: Frontal radiograph of the chest.    COMPARISON: None    FINDINGS:  Right chest port with tip in the SVC.  Hazy opacities bilateral lung bases.  There is no pleural effusion or pneumothorax.  The heart size is not well evaluated in this position  No acute osseous abnormality.    IMPRESSION:  Hazy opacities bilateral lung bases may represent atelectasis. Infection   is not excluded.      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    Procedure:  Cardiac CT    Scanner:  [employer] One    Acquisitions:  1.  Non-contrast prospective ECG-gated CT heart  2.  Contrast-enhanced ECG-gated CT heart  3.  Delayed-phase (1 minute) ECG-gated CT heart  4.  Delayed-phase (2 minutes) ECG-gated CT heart    Contrast:  63 mL Omnipaque 350    Complications:  None    Heart rate:  98 bpm    Quality:  Fair (increased image noise)    Post-processing:  Images analyzed with Vitrea software.    Comparison:  CT angiography chest 12.12.2024    Results:    Superior vena cava (SVC)  Right-sided port terminates in the SVC.    Low-attenuation structure originating from the distal tip of the port in   the SVC extends into the right atrium. The structure measures   approximately 15 mm x 15 mm in cross section within the SVC and extends   approximately 65 mm inferiorly into the right atrium. The low-attenuation   structure persists on delayed-phase imaging.    Chambers  The cardiac chambers are qualitatively normal in size.    No overt filling defect noted in the left atrial appendage.    Coronary arteries  This study was not protocoled for coronary artery assessment.    No coronary artery calcium noted.    There is no anomalous coronary artery origin.    Aorta  There is no thoracic aortic aneurysm.    Pericardium  No pericardial effusion noted.    Non-cardiac findings  Multiple lymph nodes/lesions are noted within the left breast/left   anterior chest wall/left axillary region. No hilar or mediastinal   adenopathy is noted. Pulmonary embolus is noted within the right lower   lobe pulmonary artery. Fairly large patchy opacity in the right lower   lobe represents pulmonary infarct. Linear opacities representing   atelectasis are noted in the left lower lobe. No pleural effusions are   noted. Degenerative changes of the spine are noted.    Impression:  Thrombus associated with the distal tip of the port in the SVC extends   into the right atrium. Thrombus measures up to 15 mm x 15 mm in cross   section within the SVC and extends approximately 65 mm inferiorly into   the right atrial cavity. Findings reviewed with Dr. Russ Foster on   12.13.2024 at 15:18 with Teams.    Pulmonary embolus within the right lower lobe pulmonary artery with   associated fairly large infarct in the right lower lobe.

## 2024-12-14 NOTE — PROGRESS NOTE ADULT - PROBLEM SELECTOR PLAN 6
DVT PPX: on Heparin gtt  Diet: Regular       Dietician/ Nutritionist Eval.   OOBTC, ambulate as tolerated. Fall precautions. Aspiration precautions. Delirium prevention measures, Promote sleep hygiene. Incentive spirometry.   # Dispo: pending clinical improvement, anticipate discharge back to home, F/U CM.  # Med recs done per notes from [facility], patient arrived on heparin gtt.

## 2024-12-14 NOTE — PROGRESS NOTE ADULT - SUBJECTIVE AND OBJECTIVE BOX
[facility] Division of Hospital Medicine  Juan Antonio Child MD  M-F, 8A-5P: MS Teams  Other Times: HIC Extension / HIC Pager      Patient is a 38y old  Female who presents with a chief complaint of PE (14 Dec 2024 09:08)      SUBJECTIVE / OVERNIGHT EVENTS:  Patient was examined today. She denies any new symptoms. She denies headache, dizziness, chest pain, nausea, vomiting, fever, dyspnea abd pain, nose bleed, hemoptysis gum bleed, hematuria, blood in stool, she states her last BM was .     ADDITIONAL REVIEW OF SYSTEMS:    MEDICATIONS  (STANDING):  folic acid 1 milliGRAM(s) Oral daily  heparin  Infusion. 2800 Unit(s)/Hr (28 mL/Hr) IV Continuous <Continuous>    MEDICATIONS  (PRN):  acetaminophen     Tablet .. 650 milliGRAM(s) Oral every 6 hours PRN Temp greater or equal to 38C (100.4F), Mild Pain (1 - 3)  benzonatate 100 milliGRAM(s) Oral three times a day PRN Cough  heparin   Injectable 8000 Unit(s) IV Push every 6 hours PRN For aPTT less than 40  heparin   Injectable 4000 Unit(s) IV Push every 6 hours PRN For aPTT between 40 - 57      CAPILLARY BLOOD GLUCOSE          I&O's Summary      Daily     Daily     PHYSICAL EXAM:  Vital Signs Last 24 Hrs  T(C): 37 (14 Dec 2024 04:47), Max: 38.3 (14 Dec 2024 00:17)  T(F): 98.6 (14 Dec 2024 04:47), Max: 100.9 (14 Dec 2024 00:17)  HR: 96 (14 Dec 2024 04:47) (96 - 117)  BP: 100/60 (14 Dec 2024 04:47) (100/60 - 123/72)  BP(mean): --  RR: 18 (14 Dec 2024 04:47) (18 - 18)  SpO2: 94% (14 Dec 2024 04:47) (92% - 94%)    Parameters below as of 14 Dec 2024 04:47  Patient On (Oxygen Delivery Method): room air      CONSTITUTIONAL: Well-developed, obese  EYES: No conjunctival or scleral injection, non-icteric; PERRLA and symmetric  ENMT: No external nasal lesions; nasal mucosa not inflamed, oral mucosa with moist membranes  RESPIRATORY: Breathing comfortably; lungs CTABL  CARDIOVASCULAR: +S1S2, RRR, no M/G/R; pedal pulses full and symmetric; no peripheral edema   GASTROINTESTINAL: No palpable masses or tenderness, soft, +BS throughout,   MUSCULOSKELETAL: No digital clubbing or cyanosis;  normal strength and tone of extremities  SKIN: No rashes or ulcers noted; no subcutaneous nodules or induration palpable  NEUROLOGIC: sensation intact in LEs b/l to light touch,  motor strength 5/5 all extremities   PSYCHIATRIC: A+O x 3; mood and affect appropriate; calm, cooperative. poor insight.    LABS:                        8.0    11.87 )-----------( 327      ( 14 Dec 2024 07:03 )             26.4     12-    136  |  99  |  12  ----------------------------<  185[H]  3.3[L]   |  22  |  0.68    Ca    8.5      14 Dec 2024 07:03  Phos  3.7     12-12  Mg     1.8     12-12    TPro  6.1  /  Alb  3.0[L]  /  TBili  0.4  /  DBili  x   /  AST  19  /  ALT  17  /  AlkPhos  69  12-12    LIVER FUNCTIONS - ( 12 Dec 2024 18:27 )  Alb: 3.0 g/dL / Pro: 6.1 g/dL / ALK PHOS: 69 U/L / ALT: 17 U/L / AST: 19 U/L / GGT: x           PT/INR - ( 12 Dec 2024 18:27 )   PT: 13.9 sec;   INR: 1.22 ratio         PTT - ( 14 Dec 2024 09:54 )  PTT:43.1 sec      Urinalysis Basic - ( 14 Dec 2024 07:04 )    Color: Yellow / Appearance: Clear / S.011 / pH: x  Gluc: x / Ketone: Negative mg/dL  / Bili: Negative / Urobili: 0.2 mg/dL   Blood: x / Protein: Negative mg/dL / Nitrite: Negative   Leuk Esterase: Trace / RBC: 0 /HPF / WBC 4 /HPF   Sq Epi: x / Non Sq Epi: 5 /HPF / Bacteria: Negative /HPF            RADIOLOGY & ADDITIONAL TESTS:  Results Reviewed:   Imaging Personally Reviewed:  Electrocardiogram Personally Reviewed:    COORDINATION OF CARE:  Care Discussed with Consultants/Other Providers [Y/N]:  Prior or Outpatient Records Reviewed [Y/N]:

## 2024-12-15 LAB
ADD ON TEST-SPECIMEN IN LAB: SIGNIFICANT CHANGE UP
ALBUMIN SERPL ELPH-MCNC: 2.2 G/DL — LOW (ref 3.3–5)
ALP SERPL-CCNC: 98 U/L — SIGNIFICANT CHANGE UP (ref 40–120)
ALT FLD-CCNC: 20 U/L — SIGNIFICANT CHANGE UP (ref 10–45)
APTT BLD: 74.3 SEC — HIGH (ref 24.5–35.6)
APTT BLD: 87.9 SEC — HIGH (ref 24.5–35.6)
AST SERPL-CCNC: 24 U/L — SIGNIFICANT CHANGE UP (ref 10–40)
BILIRUB DIRECT SERPL-MCNC: <0.1 MG/DL — SIGNIFICANT CHANGE UP (ref 0–0.3)
BILIRUB INDIRECT FLD-MCNC: >0.2 MG/DL — SIGNIFICANT CHANGE UP (ref 0.2–1)
BILIRUB SERPL-MCNC: 0.3 MG/DL — SIGNIFICANT CHANGE UP (ref 0.2–1.2)
CREAT SERPL-MCNC: 0.51 MG/DL — SIGNIFICANT CHANGE UP (ref 0.5–1.3)
EGFR: 122 ML/MIN/1.73M2 — SIGNIFICANT CHANGE UP
HCT VFR BLD CALC: 31.6 % — LOW (ref 34.5–45)
HGB BLD-MCNC: 9.1 G/DL — LOW (ref 11.5–15.5)
INR BLD: 1.3 RATIO — HIGH (ref 0.85–1.16)
MCHC RBC-ENTMCNC: 22.6 PG — LOW (ref 27–34)
MCHC RBC-ENTMCNC: 28.8 G/DL — LOW (ref 32–36)
MCV RBC AUTO: 78.6 FL — LOW (ref 80–100)
NRBC # BLD: 0 /100 WBCS — SIGNIFICANT CHANGE UP (ref 0–0)
PLATELET # BLD AUTO: 359 K/UL — SIGNIFICANT CHANGE UP (ref 150–400)
PROT SERPL-MCNC: 6.4 G/DL — SIGNIFICANT CHANGE UP (ref 6–8.3)
PROTHROM AB SERPL-ACNC: 14.9 SEC — HIGH (ref 9.9–13.4)
RBC # BLD: 4.02 M/UL — SIGNIFICANT CHANGE UP (ref 3.8–5.2)
RBC # FLD: 16.4 % — HIGH (ref 10.3–14.5)
WBC # BLD: 10.19 K/UL — SIGNIFICANT CHANGE UP (ref 3.8–10.5)
WBC # FLD AUTO: 10.19 K/UL — SIGNIFICANT CHANGE UP (ref 3.8–10.5)

## 2024-12-15 PROCEDURE — 99233 SBSQ HOSP IP/OBS HIGH 50: CPT

## 2024-12-15 RX ORDER — LIDOCAINE 40 MG/G
2 CREAM TOPICAL DAILY
Refills: 0 | Status: DISCONTINUED | OUTPATIENT
Start: 2024-12-15 | End: 2024-12-18

## 2024-12-15 RX ORDER — ENOXAPARIN SODIUM 30 MG/.3ML
100 INJECTION SUBCUTANEOUS EVERY 12 HOURS
Refills: 0 | Status: DISCONTINUED | OUTPATIENT
Start: 2024-12-15 | End: 2024-12-18

## 2024-12-15 RX ORDER — ACETAMINOPHEN 500MG 500 MG/1
1000 TABLET, COATED ORAL EVERY 8 HOURS
Refills: 0 | Status: DISCONTINUED | OUTPATIENT
Start: 2024-12-15 | End: 2024-12-18

## 2024-12-15 RX ADMIN — Medication 3200 UNIT(S)/HR: at 07:17

## 2024-12-15 RX ADMIN — ENOXAPARIN SODIUM 100 MILLIGRAM(S): 30 INJECTION SUBCUTANEOUS at 16:44

## 2024-12-15 RX ADMIN — Medication 3200 UNIT(S)/HR: at 10:08

## 2024-12-15 RX ADMIN — ACETAMINOPHEN 500MG 1000 MILLIGRAM(S): 500 TABLET, COATED ORAL at 10:09

## 2024-12-15 RX ADMIN — ACETAMINOPHEN 500MG 1000 MILLIGRAM(S): 500 TABLET, COATED ORAL at 12:44

## 2024-12-15 RX ADMIN — Medication 1 MILLIGRAM(S): at 10:14

## 2024-12-15 RX ADMIN — Medication 3200 UNIT(S)/HR: at 04:54

## 2024-12-15 RX ADMIN — LIDOCAINE 2 PATCH: 40 CREAM TOPICAL at 14:44

## 2024-12-15 RX ADMIN — ACETAMINOPHEN 500MG 650 MILLIGRAM(S): 500 TABLET, COATED ORAL at 02:28

## 2024-12-15 RX ADMIN — LIDOCAINE 2 PATCH: 40 CREAM TOPICAL at 03:03

## 2024-12-15 RX ADMIN — LIDOCAINE 2 PATCH: 40 CREAM TOPICAL at 23:52

## 2024-12-15 RX ADMIN — ACETAMINOPHEN 500MG 650 MILLIGRAM(S): 500 TABLET, COATED ORAL at 01:58

## 2024-12-15 RX ADMIN — LIDOCAINE 2 PATCH: 40 CREAM TOPICAL at 10:15

## 2024-12-15 RX ADMIN — Medication 3200 UNIT(S)/HR: at 02:39

## 2024-12-15 RX ADMIN — REMDESIVIR 200 MILLIGRAM(S): 100 INJECTION, POWDER, LYOPHILIZED, FOR SOLUTION INTRAVENOUS at 21:10

## 2024-12-15 RX ADMIN — LIDOCAINE 2 PATCH: 40 CREAM TOPICAL at 19:40

## 2024-12-15 NOTE — DIETITIAN INITIAL EVALUATION ADULT - OTHER INFO
Reports UBW fluctuates between 190-212lbs. Denies recent wt changes.   Dosing wt: 212 lbs (12-13)  Wt history per chart: 212 lbs (12-13, standing). RD to continue to monitor weight trends as able/available.     Additional nutrition-related concerns:  - Breast cancer recently starting chemotherapy  - COVID-19(+). Ordered for remdesivir

## 2024-12-15 NOTE — DIETITIAN INITIAL EVALUATION ADULT - PERTINENT LABORATORY DATA
12-15    x   |  x   |  x   ----------------------------<  x   x    |  x   |  0.51    Ca    8.5      14 Dec 2024 12:22    TPro  6.4  /  Alb  2.2[L]  /  TBili  0.3  /  DBili  <0.1  /  AST  24  /  ALT  20  /  AlkPhos  98  12-15  A1C with Estimated Average Glucose Result: 5.9 % (12-14-24 @ 12:21)

## 2024-12-15 NOTE — PROGRESS NOTE ADULT - SUBJECTIVE AND OBJECTIVE BOX
Subjective  Events of transfer last 24 hours were reviewed.  Patient continues to note some right sided pleuritic chest discomfort upon deep intubation/extubation and when she tries to move.  This is largely unchanged compared to the prior day.  She otherwise denies any cardiopulmonary complaints at rest.  Denies any lightheaded sensation, dizziness, palpitation, abdominal pain.  Continues to have drainage from her left breast which she notes is unchanged in nature.  No blood noted in her stool or urine.    Review of systems  14 point review of systems is otherwise unremarkable separate described above in the history of present illness    Physical examination  No apparent distress, alert and oriented x 3, appropriate affect  JVD is not elevated, supple, no lymphadenopathy  Clear to auscultation bilateral with no wheezing rhonchi crackles  Regular rate and rhythm with no murmur rub or gallop line positive bowel sound, soft, nontender, nondistended, no mass and guarding or rebound tenderness  No clubbing cyanosis or edema  Moving all extremities spontaneously  2+ DP/PT pulse  No focal deficits    Assessment/plan  Poorly differentiated breast cancer T4 N3, triple negative  --Large necrotic left breast mass  --Necrotic left subpectoral and left axillary lymph nodes  Low risk pulmonary embolism  Thrombus on Mediport extending into the right atrium  COVID positive    Further discussions were had with the patient and her mother who is at her bedside reviewing her clinical condition, imaging on studies today and potential various treatment options. Etiologies of the patient's clot was gone over.      The various treatment options were again gone over in terms of management of her pulmonary embolism and thrombus on right sided Mediport.  The potential acute/subacute/chronic complications of her pulmonary embolism was gone over.  The pros/cons and risk/benefits of the various treatment options were gone over.  The patient has a low risk pulmonary embolism.  It is recommended that she be treated in a conservative/medical fashion for her pulmonary embolism.    The potential acute/subacute/chronic risk of the clot on the Mediport was gone over.  Explained to the patient the pros/cons of being treated in a conservative/medical fashion along with the pros/cons of mechanical thrombectomy of clot on Mediport.  Indications for mechanical thrombectomy were gone over.  The patient wishes to be treated in a conservative/medical fashion for her clot on her Mediport.  Continue to closely monitor for signs and symptoms of bleeding.      Would recommend that the patient be transition to Lovenox.  Indications for Lovenox and benefits/risks of the medication were discussed with the patient and her mother.  When patient is ready for discharge anticoagulation therapy as per oncology team along with length of anticoagulation therapy treatment.    Findings and plan were discussed with the patient's medical team.    35 minutes were spent on this encounter for extensive review of medical record details including labs and/or imaging studies and/or adjacent care team and consultant records, as well as review and reconciliation of current medications. Time was spent on obtaining a history, performing physical examination of patient, and answering patient and/or family questions regarding plan of care. Time was also spent discussing plan of care with patient’s other care team members including primary and consulting teams. Time also was spent on documentation of this encounter into the EHR.

## 2024-12-15 NOTE — DIETITIAN INITIAL EVALUATION ADULT - NSFNSGIIOFT_GEN_A_CORE
Denies nausea, vomiting, constipation, diarrhea. Reports last BM 12/14. Pt not currently ordered for bowel regimen.

## 2024-12-15 NOTE — PROGRESS NOTE ADULT - SUBJECTIVE AND OBJECTIVE BOX
[facility] Division of Hospital Medicine  Juan Antonio Child MD M-F, 8A-5P: MS Teams  Other Times: HIC Extension / HIC Pager      Patient is a 38y old  Female who presents with a chief complaint of Other pulmonary embolism without acute cor pulmonale    Per chart, Patient is a 38 year old female with newly diagnosed poorly differentiated breast cancer, recently started neoadjuvant chemotherapy at [facility] presented to [facility] with complaint of R sided back pain with dyspnea , found to have  bilateral PE with possible clot in right atrium, transferred to [facility] for PERT evaluation.  (15 Dec 2024 10:44)      SUBJECTIVE / OVERNIGHT EVENTS:  Patient was examined today. She denies any new complaint, on ROS she states she has some back pain. She denies fever, chills, cough, dyspnea, chest pain, dizziness, palpitations, abd pain, nausea, gum bleeding, nose bleeding, hematuria, melena.     ADDITIONAL REVIEW OF SYSTEMS:    MEDICATIONS  (STANDING):  folic acid 1 milliGRAM(s) Oral daily  heparin  Infusion. 2800 Unit(s)/Hr (28 mL/Hr) IV Continuous <Continuous>  lidocaine   4% Patch 2 Patch Transdermal daily  remdesivir  IVPB   IV Intermittent   remdesivir  IVPB 100 milliGRAM(s) IV Intermittent every 24 hours    MEDICATIONS  (PRN):  acetaminophen     Tablet .. 1000 milliGRAM(s) Oral every 8 hours PRN Temp greater or equal to 38C (100.4F), Severe Pain (7 - 10)  benzonatate 100 milliGRAM(s) Oral three times a day PRN Cough  heparin   Injectable 8000 Unit(s) IV Push every 6 hours PRN For aPTT less than 40  heparin   Injectable 4000 Unit(s) IV Push every 6 hours PRN For aPTT between 40 - 57      CAPILLARY BLOOD GLUCOSE          I&O's Summary    14 Dec 2024 07:01  -  15 Dec 2024 07:00  --------------------------------------------------------  IN: 516 mL / OUT: 0 mL / NET: 516 mL    15 Dec 2024 07:01  -  15 Dec 2024 12:49  --------------------------------------------------------  IN: 192 mL / OUT: 0 mL / NET: 192 mL        Daily     Daily     PHYSICAL EXAM:  Vital Signs Last 24 Hrs  T(C): 36.4 (15 Dec 2024 08:30), Max: 37 (15 Dec 2024 00:17)  T(F): 97.6 (15 Dec 2024 08:30), Max: 98.6 (15 Dec 2024 00:17)  HR: 92 (15 Dec 2024 08:30) (82 - 105)  BP: 122/71 (15 Dec 2024 08:30) (102/65 - 132/70)  BP(mean): --  RR: 18 (15 Dec 2024 08:30) (18 - 18)  SpO2: 97% (15 Dec 2024 08:30) (93% - 98%)    Parameters below as of 15 Dec 2024 08:30  Patient On (Oxygen Delivery Method): nasal cannula  O2 Flow (L/min): 1    CONSTITUTIONAL: Well-developed, obese  EYES: No conjunctival or scleral injection, non-icteric; PERRLA and symmetric  ENMT: No external nasal lesions; nasal mucosa not inflamed, oral mucosa with moist membranes  RESPIRATORY: Breathing comfortably; lungs CTABL  CARDIOVASCULAR: +S1S2, RRR, no M/G/R; pedal pulses full and symmetric; no peripheral edema   GASTROINTESTINAL: No palpable masses or tenderness, soft, +BS throughout,   MUSCULOSKELETAL: No digital clubbing or cyanosis;  normal strength and tone of extremities  SKIN: No rashes or ulcers noted; no subcutaneous nodules or induration palpable  NEUROLOGIC: sensation intact in LEs b/l to light touch,  motor strength 5/5 all extremities   PSYCHIATRIC: A+O x 3; mood and affect appropriate; calm, cooperative. poor insight.      LABS:                        9.1    10.19 )-----------( 359      ( 15 Dec 2024 07:03 )             31.6     12-15    x   |  x   |  x   ----------------------------<  x   x    |  x   |  0.51    Ca    8.5      14 Dec 2024 12:22    TPro  6.4  /  Alb  2.2[L]  /  TBili  0.3  /  DBili  <0.1  /  AST  24  /  ALT  20  /  AlkPhos  98  12-15    LIVER FUNCTIONS - ( 15 Dec 2024 07:03 )  Alb: 2.2 g/dL / Pro: 6.4 g/dL / ALK PHOS: 98 U/L / ALT: 20 U/L / AST: 24 U/L / GGT: x           PT/INR - ( 15 Dec 2024 07:03 )   PT: 14.9 sec;   INR: 1.30 ratio         PTT - ( 15 Dec 2024 08:50 )  PTT:87.9 sec      Urinalysis Basic - ( 14 Dec 2024 12:22 )    Color: x / Appearance: x / SG: x / pH: x  Gluc: 148 mg/dL / Ketone: x  / Bili: x / Urobili: x   Blood: x / Protein: x / Nitrite: x   Leuk Esterase: x / RBC: x / WBC x   Sq Epi: x / Non Sq Epi: x / Bacteria: x        Culture - Blood (collected 14 Dec 2024 01:46)  Source: .Blood BLOOD  Preliminary Report (15 Dec 2024 09:02):    No growth at 24 hours    Culture - Blood (collected 14 Dec 2024 01:46)  Source: .Blood BLOOD  Preliminary Report (15 Dec 2024 09:02):    No growth at 24 hours          RADIOLOGY & ADDITIONAL TESTS:  Results Reviewed:   Imaging Personally Reviewed:  Electrocardiogram Personally Reviewed:    COORDINATION OF CARE:  Care Discussed with Consultants/Other Providers [Y/N]:  Prior or Outpatient Records Reviewed [Y/N]:

## 2024-12-15 NOTE — PROGRESS NOTE ADULT - PROBLEM SELECTOR PLAN 2
Febrile overnight Tmax 100.9 Dec 14.  COVID+  F/U blood cx x2, urine cx.  Check stat CBC/diff, CMP, LDH, Procalcitonin, CPK, Troponin, CRP, Ferritin, D-dimer, PT/INR, PTT, EKG, CXR, A1c.  Not hypoxic, patient denies any symptoms. started remdesivir.   ID team consulted > F/U on their eval and recs.

## 2024-12-15 NOTE — PROGRESS NOTE ADULT - ASSESSMENT
Patient is a 38 year old female with  newly diagnosed poorly differentiated breast ca, recently started neoadjuvant chemotherapy at [facility] presented to [facility] with complaint of R sided back pain with dyspnea , found to have  bilateral PE with possible clot in right atrium, transferred to [facility] for PERT evaluation.

## 2024-12-15 NOTE — DIETITIAN INITIAL EVALUATION ADULT - ADD RECOMMEND
1) Continue Regular diet.   2) Recommend Ensure Max 3x/day to help support optimal protein-energy intake.  3) Monitor PO intake, GI tolerance, skin integrity, labs, weight, and bowel movement regularity.   4) Honor food preferences as feasible. Assist with meals PRN and encourage PO intake

## 2024-12-15 NOTE — DIETITIAN INITIAL EVALUATION ADULT - ENERGY INTAKE
Pt reports good PO intake in-house. Is asking for Ensures (sugar-free) however as she states she forces herself to eat.

## 2024-12-15 NOTE — DIETITIAN INITIAL EVALUATION ADULT - REASON FOR ADMISSION
Other pulmonary embolism without acute cor pulmonale    Per chart, Patient is a 38 year old female with newly diagnosed poorly differentiated breast cancer, recently started neoadjuvant chemotherapy at [facility] presented to [facility] with complaint of R sided back pain with dyspnea , found to have  bilateral PE with possible clot in right atrium, transferred to [facility] for PERT evaluation.

## 2024-12-15 NOTE — DIETITIAN INITIAL EVALUATION ADULT - PERSON TAUGHT/METHOD
Discussed importance of adequate protein-energy consumption to meet estimated nutrient needs. Encouraged intake of meals and oral nutrition supplements as tolerated. Encouraged intake of protein-rich foods first at mealtimes to help preserve lean muscle mass. Reviewed food choices that are high in protein. Pt noted with good comprehension and made aware RD remains available for further questions/concerns./verbal instruction/patient instructed

## 2024-12-15 NOTE — PROGRESS NOTE ADULT - PROBLEM SELECTOR PLAN 1
#bilateral PE   #RA clot  #nonhealing breast wound/drainage.    - AC: patient arrived to [facility] ED on heparin gtt (discussed with clinical pharmacist, continue heparin gtt as previously ordered).  - patient was planned for MIKEY 12/13 am, however she refused. checked cardiac CT.  - 12/13: GOC discussion documented by vascular team and also as noted in admission note with me, ?that patient has very poor insight?, needs repeat GOC risk/benefit discussions. SW consulted, consider psych eval.   - CTA showing likely catheter-associated thrombus in SVC/RA that could potentially embolize.  - 12/14: patient stated she wants to discuss options with vascular team again 12/14. per vascular cards 12/14: patient has now opted for conservative/medical approach. However 12/15: patient stated to me during rounds that she defers her decision to whatever vascular cards recommends > vascular team to follow up on further risks/benefits/GOC discussions, will keep patient NPO after MN Sunday night incase she decides to proceed with vascular procedure on Monday.   >       > F/U vascular cardiology and surgery teams for further recs ?plan for thrombectomy/embolization? further GOC discussions.   >        >  Vascular cards rec switching to lovenox > Hem Onc team in agreement, awaiting accurate weight to dose Lovenox, RN aware.   - BL LE duplex US: No evidence of deep venous thrombosis in either lower extremity.   - Case discussed with breast surgery attending and they rec local wound care as of 12/13 and 12/14 and that patient needs to follow up with outpatient Dr. Mack Major.  - IR consulted regarding question of mediport/alternative access for chemo.    - call cardiology/PERT team if becomes hypotensive, may need TPA at that point.  - daily bmp for Mg>2, K>4.  - Telemetry. #bilateral PE   #RA clot  #nonhealing breast wound/drainage.    - AC: patient arrived to [facility] ED on heparin gtt (discussed with clinical pharmacist, continue heparin gtt as previously ordered).  - patient was planned for MIKEY 12/13 am, however she refused. checked cardiac CT.  - 12/13: GOC discussion documented by vascular team and also as noted in admission note with me, ?that patient has very poor insight?, needs repeat GOC risk/benefit discussions. SW consulted, consider psych eval.   - CTA showing likely catheter-associated thrombus in SVC/RA that could potentially embolize.  - 12/14: patient stated she wants to discuss options with vascular team again 12/14. per vascular cards 12/14: patient has now opted for conservative/medical approach. However 12/15: patient stated to me during rounds that she defers her decision to whatever vascular cards recommends > vascular team to follow up on further risks/benefits/GOC discussions, will keep patient NPO after MN Sunday night incase she decides to proceed with vascular procedure on Monday. consider psych eval/ethics consult.   >       > F/U vascular cardiology and surgery teams for further recs ?plan for thrombectomy/embolization? further GOC discussions.   >        >  Vascular cards rec switching to lovenox > Hem Onc team in agreement, awaiting accurate weight to dose Lovenox, RN aware.   - BL LE duplex US: No evidence of deep venous thrombosis in either lower extremity.   - Case discussed with breast surgery attending and they rec local wound care as of 12/13 and 12/14 and that patient needs to follow up with outpatient Dr. Mack Major.  - IR consulted regarding question of mediport/alternative access for chemo.    - call cardiology/PERT team if becomes hypotensive, may need TPA at that point.  - daily bmp for Mg>2, K>4.  - Telemetry.

## 2024-12-15 NOTE — DIETITIAN INITIAL EVALUATION ADULT - PERTINENT MEDS FT
MEDICATIONS  (STANDING):  folic acid 1 milliGRAM(s) Oral daily  heparin  Infusion. 2800 Unit(s)/Hr (28 mL/Hr) IV Continuous <Continuous>  lidocaine   4% Patch 2 Patch Transdermal daily  remdesivir  IVPB   IV Intermittent   remdesivir  IVPB 100 milliGRAM(s) IV Intermittent every 24 hours    MEDICATIONS  (PRN):  acetaminophen     Tablet .. 1000 milliGRAM(s) Oral every 8 hours PRN Temp greater or equal to 38C (100.4F), Severe Pain (7 - 10)  benzonatate 100 milliGRAM(s) Oral three times a day PRN Cough  heparin   Injectable 8000 Unit(s) IV Push every 6 hours PRN For aPTT less than 40  heparin   Injectable 4000 Unit(s) IV Push every 6 hours PRN For aPTT between 40 - 57

## 2024-12-15 NOTE — DIETITIAN INITIAL EVALUATION ADULT - PROBLEM SELECTOR PLAN 2
Started chemo past week, has R port.   [facility] consulted on admission. F/U on their eval and recs.

## 2024-12-15 NOTE — PROGRESS NOTE ADULT - PROBLEM SELECTOR PLAN 3
Started chemo 12/5/24, has R port.   Follows Dr. Quezada outpatient.  [facility] consulted on admission. Case discussed with Oncology attending 12/13 , they rec prioritizing AC and vascular intervention, surgery evaluation for bleeding biopsy site, that outpatient oncologist Dr. Quezada agrees with plan for vascular intervention.  -  F/U on further Hem Onc recs.

## 2024-12-15 NOTE — PROGRESS NOTE ADULT - PROBLEM SELECTOR PLAN 6
DVT PPX: on Heparin gtt  Diet: Regular       Dietician/ Nutritionist Eval.   OOBTC, ambulate as tolerated. Fall precautions. Aspiration precautions. Delirium prevention measures, Promote sleep hygiene. Incentive spirometry.   # Dispo: pending clinical improvement, anticipate discharge back to home, F/U CM.  # Med recs done per notes from [facility], patient arrived on heparin gtt.

## 2024-12-15 NOTE — DIETITIAN INITIAL EVALUATION ADULT - NS FNS DIET ORDER
Diet, NPO after Midnight:      NPO Start Date: 15-Dec-2024,   NPO Start Time: 23:59  Except Medications (12-15-24 @ 07:23) [Active]  Diet, Regular (12-13-24 @ 11:48) [Active]

## 2024-12-15 NOTE — DIETITIAN INITIAL EVALUATION ADULT - PROBLEM SELECTOR PLAN 1
#bilateral PE   #RA clot  #nonhealing breast wound/drainage.    - AC: patient arrived to [facility] ED on heparin gtt (discussed with clinical pharmacist, continue heparin gtt as previously ordered).  - patient was planned for MIKEY this am, however she refused. Now plan for cardiac CT - pending.   - GOC discussion documented by vascular team and also as above with me, ?that patient has very poor insight?, will need repeat GOC discussion, SW consulted, consider psych eval.   - Check BL LE duplex US.   - F/U vascular cardiology and surgery teams for further recs.   >     > Surgery team paged again during admission due to concern for serosanguinous oozing from patient's left breast.   - call cardiology if becomes hypotensive, may need TPA at that point.  - daily bmp for Mg>2, K>4.  - Telemetry.      ==> CHART UPDATE: Case discussed with vascular cards attending, if patient agreeable to procedure again then it will have to be likely done on Monday now. Case discussed with IR regarding question of mediport/alternative access for chemo. Case discussed with Oncology attending and they rec prioritizing AC and clot evacuation, surgery evaluation for bleeding biopsy site, that outpatient oncologist Dr. Calderon agrees with plan for vascular intervention. Case discussed with breast surgery attending and they rec local wound care for now and that patient needs to follow up with outpatient Dr. Mack Major.

## 2024-12-16 LAB
ALBUMIN SERPL ELPH-MCNC: 2.5 G/DL — LOW (ref 3.3–5)
ALP SERPL-CCNC: 90 U/L — SIGNIFICANT CHANGE UP (ref 40–120)
ALT FLD-CCNC: 20 U/L — SIGNIFICANT CHANGE UP (ref 10–45)
ANION GAP SERPL CALC-SCNC: 16 MMOL/L — SIGNIFICANT CHANGE UP (ref 5–17)
APTT BLD: 30.2 SEC — SIGNIFICANT CHANGE UP (ref 24.5–35.6)
AST SERPL-CCNC: 17 U/L — SIGNIFICANT CHANGE UP (ref 10–40)
BILIRUB DIRECT SERPL-MCNC: <0.1 MG/DL — SIGNIFICANT CHANGE UP (ref 0–0.3)
BILIRUB INDIRECT FLD-MCNC: >0.1 MG/DL — LOW (ref 0.2–1)
BILIRUB SERPL-MCNC: 0.2 MG/DL — SIGNIFICANT CHANGE UP (ref 0.2–1.2)
BLD GP AB SCN SERPL QL: NEGATIVE — SIGNIFICANT CHANGE UP
BUN SERPL-MCNC: 13 MG/DL — SIGNIFICANT CHANGE UP (ref 7–23)
CALCIUM SERPL-MCNC: 9 MG/DL — SIGNIFICANT CHANGE UP (ref 8.4–10.5)
CHLORIDE SERPL-SCNC: 99 MMOL/L — SIGNIFICANT CHANGE UP (ref 96–108)
CO2 SERPL-SCNC: 21 MMOL/L — LOW (ref 22–31)
CREAT SERPL-MCNC: 0.62 MG/DL — SIGNIFICANT CHANGE UP (ref 0.5–1.3)
EGFR: 116 ML/MIN/1.73M2 — SIGNIFICANT CHANGE UP
GLUCOSE SERPL-MCNC: 140 MG/DL — HIGH (ref 70–99)
HCT VFR BLD CALC: 26.5 % — LOW (ref 34.5–45)
HGB BLD-MCNC: 8.1 G/DL — LOW (ref 11.5–15.5)
INR BLD: 1.38 RATIO — HIGH (ref 0.85–1.16)
MAGNESIUM SERPL-MCNC: 2 MG/DL — SIGNIFICANT CHANGE UP (ref 1.6–2.6)
MCHC RBC-ENTMCNC: 22.5 PG — LOW (ref 27–34)
MCHC RBC-ENTMCNC: 30.6 G/DL — LOW (ref 32–36)
MCV RBC AUTO: 73.6 FL — LOW (ref 80–100)
NRBC # BLD: 0 /100 WBCS — SIGNIFICANT CHANGE UP (ref 0–0)
PHOSPHATE SERPL-MCNC: 3.2 MG/DL — SIGNIFICANT CHANGE UP (ref 2.5–4.5)
PLATELET # BLD AUTO: 422 K/UL — HIGH (ref 150–400)
POTASSIUM SERPL-MCNC: 4.1 MMOL/L — SIGNIFICANT CHANGE UP (ref 3.5–5.3)
POTASSIUM SERPL-SCNC: 4.1 MMOL/L — SIGNIFICANT CHANGE UP (ref 3.5–5.3)
PROT SERPL-MCNC: 6.3 G/DL — SIGNIFICANT CHANGE UP (ref 6–8.3)
PROTHROM AB SERPL-ACNC: 15.7 SEC — HIGH (ref 9.9–13.4)
RBC # BLD: 3.6 M/UL — LOW (ref 3.8–5.2)
RBC # FLD: 16.2 % — HIGH (ref 10.3–14.5)
RH IG SCN BLD-IMP: POSITIVE — SIGNIFICANT CHANGE UP
SODIUM SERPL-SCNC: 136 MMOL/L — SIGNIFICANT CHANGE UP (ref 135–145)
WBC # BLD: 9.47 K/UL — SIGNIFICANT CHANGE UP (ref 3.8–10.5)
WBC # FLD AUTO: 9.47 K/UL — SIGNIFICANT CHANGE UP (ref 3.8–10.5)

## 2024-12-16 PROCEDURE — 99233 SBSQ HOSP IP/OBS HIGH 50: CPT

## 2024-12-16 PROCEDURE — 99232 SBSQ HOSP IP/OBS MODERATE 35: CPT

## 2024-12-16 RX ORDER — GUAIFENESIN 400 MG
100 TABLET ORAL ONCE
Refills: 0 | Status: COMPLETED | OUTPATIENT
Start: 2024-12-16 | End: 2024-12-16

## 2024-12-16 RX ADMIN — ENOXAPARIN SODIUM 100 MILLIGRAM(S): 30 INJECTION SUBCUTANEOUS at 05:36

## 2024-12-16 RX ADMIN — Medication 1 MILLIGRAM(S): at 11:41

## 2024-12-16 RX ADMIN — LIDOCAINE 2 PATCH: 40 CREAM TOPICAL at 20:26

## 2024-12-16 RX ADMIN — BENZONATATE 100 MILLIGRAM(S): 100 CAPSULE ORAL at 05:35

## 2024-12-16 RX ADMIN — LIDOCAINE 2 PATCH: 40 CREAM TOPICAL at 11:41

## 2024-12-16 RX ADMIN — LIDOCAINE 2 PATCH: 40 CREAM TOPICAL at 23:47

## 2024-12-16 RX ADMIN — ACETAMINOPHEN 500MG 1000 MILLIGRAM(S): 500 TABLET, COATED ORAL at 11:42

## 2024-12-16 RX ADMIN — REMDESIVIR 200 MILLIGRAM(S): 100 INJECTION, POWDER, LYOPHILIZED, FOR SOLUTION INTRAVENOUS at 20:54

## 2024-12-16 RX ADMIN — ENOXAPARIN SODIUM 100 MILLIGRAM(S): 30 INJECTION SUBCUTANEOUS at 17:08

## 2024-12-16 RX ADMIN — BENZONATATE 100 MILLIGRAM(S): 100 CAPSULE ORAL at 11:40

## 2024-12-16 RX ADMIN — ACETAMINOPHEN 500MG 1000 MILLIGRAM(S): 500 TABLET, COATED ORAL at 12:15

## 2024-12-16 RX ADMIN — Medication 100 MILLIGRAM(S): at 22:29

## 2024-12-16 NOTE — PROVIDER CONTACT NOTE (OTHER) - BACKGROUND
Pt admitted with b/l PE, on heparin gtt. Recent dx breast cancer
Pt admitted for acute PE.
38F PMH L breast CA p/w PE
Vaccine status unknown

## 2024-12-16 NOTE — PROGRESS NOTE ADULT - SUBJECTIVE AND OBJECTIVE BOX
SUBJECTIVE / OVERNIGHT EVENTS:  Today is hospital day 3d. There are no new issues or overnight events.   Did not report any lightheadedness, vertigo, shortness of breathe, chest pain, palpitations, vomiting, diarrhea currently    HPI:  Patient is a 38 year old female with  newly diagnosed poorly differentiated breast ca, recently started neoadjuvant chemotherapy at [facility] presented to [facility] with complaint of R sided back pain with dyspnea , found to have  bilateral PE with possible clot in right atrium, transferred to [facility] for PERT evaluation. At the time of admission, patient states she has noticed some bleeding from left breast biopsy site, but denies any other acute symptoms. She states she was having back pain and dyspnea prior and that was the reason she went to [facility], but it is not resolved. On ROS, she denies headache, dizziness, fever, chills, chest pain, dyspnea, orthopnea, palpitations, abdominal pain, nausea, vomiting, gum bleeding, nose bleeding, hemoptysis blood in stool, hematuria, bruising, any new rash, swelling, weakness, numbness, difficulty ambulating, cough, dysphagia, congestion, hearing change, change in vision, tinnitus, or change in appetite. On ROS she states she feels sad but denies SI/HI. She is eager to eat.       Chart reviewed for previous outside hospital notes, outpatient notes, labs, imaging, specialist notes, ED notes.  (13 Dec 2024 11:49)    MEDICATIONS  (STANDING):  enoxaparin Injectable 100 milliGRAM(s) SubCutaneous every 12 hours  folic acid 1 milliGRAM(s) Oral daily  lidocaine   4% Patch 2 Patch Transdermal daily  remdesivir  IVPB   IV Intermittent   remdesivir  IVPB 100 milliGRAM(s) IV Intermittent every 24 hours    MEDICATIONS  (PRN):  acetaminophen     Tablet .. 1000 milliGRAM(s) Oral every 8 hours PRN Temp greater or equal to 38C (100.4F), Severe Pain (7 - 10)  benzonatate 100 milliGRAM(s) Oral three times a day PRN Cough    HOME MEDICATIONS:    PHYSICAL EXAM  Vital Signs Last 24 Hrs  T(C): 36.6 (16 Dec 2024 04:42), Max: 36.9 (15 Dec 2024 12:25)  T(F): 97.9 (16 Dec 2024 04:42), Max: 98.5 (15 Dec 2024 22:00)  HR: 109 (16 Dec 2024 04:42) (92 - 109)  BP: 135/84 (16 Dec 2024 04:42) (96/57 - 135/84)  BP(mean): --  RR: 18 (16 Dec 2024 04:42) (18 - 18)  SpO2: 96% (16 Dec 2024 04:42) (93% - 99%)    Parameters below as of 16 Dec 2024 04:42  Patient On (Oxygen Delivery Method): nasal cannula  O2 Flow (L/min): 1      12-15-24 @ 07:01  -  12-16-24 @ 07:00  --------------------------------------------------------  IN: 192 mL / OUT: 0 mL / NET: 192 mL      CONSTITUTIONAL: Well-groomed, in no apparent distress;  EYES: No conjunctival or scleral injection, non-icteric;  ENMT: No external nasal lesions; Normal outer ears;  NECK: Trachea midline;  RESPIRATORY: Normal respiratory effort;   CARDIOVASCULAR: Regular rate and rhythm;  GASTROINTESTINAL: Non-distended;   EXTREMITIES:  No lower extremity edema;  NEUROLOGY: Does respond to commands appropriately;  PSYCHIATRY: Mood and Affect appropriate    LABS:                        8.1    9.47  )-----------( 422      ( 16 Dec 2024 07:20 )             26.5     12-16    136  |  99  |  13  ----------------------------<  140[H]  4.1   |  21[L]  |  0.62    Ca    9.0      16 Dec 2024 07:20  Phos  3.2     12-16  Mg     2.0     12-16    TPro  6.3  /  Alb  2.5[L]  /  TBili  0.2  /  DBili  <0.1  /  AST  17  /  ALT  20  /  AlkPhos  90  12-16    PT/INR - ( 16 Dec 2024 07:20 )   PT: 15.7 sec;   INR: 1.38 ratio         PTT - ( 16 Dec 2024 07:20 )  PTT:30.2 sec      Urinalysis Basic - ( 16 Dec 2024 07:20 )    Color: x / Appearance: x / SG: x / pH: x  Gluc: 140 mg/dL / Ketone: x  / Bili: x / Urobili: x   Blood: x / Protein: x / Nitrite: x   Leuk Esterase: x / RBC: x / WBC x   Sq Epi: x / Non Sq Epi: x / Bacteria: x        Culture - Blood (collected 14 Dec 2024 01:46)  Source: .Blood BLOOD  Preliminary Report (15 Dec 2024 09:02):    No growth at 24 hours    Culture - Blood (collected 14 Dec 2024 01:46)  Source: .Blood BLOOD  Preliminary Report (15 Dec 2024 09:02):    No growth at 24 hours          RADIOLOGY & ADDITIONAL TESTS:  EKG    Xray Chest 1 View- PORTABLE-Urgent:   ACC: [id number] EXAM:  XR CHEST PORTABLE URGENT 1V   ORDERED BY: PHILIPP VAZQUEZ     PROCEDURE DATE:  12/14/2024          INTERPRETATION:  CLINICAL INDICATION: Fever, COVID    EXAM: Frontal radiograph of the chest.    COMPARISON: None    FINDINGS:  Right chest port with tip in the SVC.  Hazy opacities bilateral lung bases.  There is no pleural effusion or pneumothorax.  The heart size is not well evaluated in this position  No acute osseous abnormality.    IMPRESSION:  Hazy opacities bilateral lung bases may represent atelectasis. Infection   is not excluded.    --- End of Report ---          SHERICE ARANDA MD; Resident Radiologist  This document has been electronically signed.  ROSIE WAGONER MD; Attending Radiologist  This document has been electronically signed. Dec 14 2024 12:27PM (12-14-24 @ 11:31)  CT Head No Cont:   ACC: [id number] EXAM:  CT BRAIN   ORDERED BY:  MARYA PERSAUD     PROCEDURE DATE:  12/12/2024          INTERPRETATION:  CLINICAL INFORMATION: Recently diagnosed left breast   cancer. Evaluate acute intracranial process.    COMPARISON: None.    CONTRAST:  IV Contrast: NONE  .    TECHNIQUE:  Serial axial images were obtained from the skull base to the   vertex using multi-slice helical technique. Sagittal and coronal   reformats were obtained.    FINDINGS:    VENTRICLES AND SULCI: Normal in size and configuration.  INTRA-AXIAL: No mass effect, acute hemorrhage, or midline shift.  EXTRA-AXIAL: No mass or fluid collection. Basal cisterns are normal in   appearance.    VISUALIZED SINUSES: Mild mucosal thickening in the left ethmoid and left   maxillary sinuses.  TYMPANOMASTOID CAVITIES: Clear.  VISUALIZED ORBITS: Normal.  CALVARIUM: Intact.    MISCELLANEOUS: None.      IMPRESSION:  No acute intracranial hemorrhage, mass effect, or midline shift.    If clinical concern persists for intracranial metastatic disease, a   contrast-enhanced MRI of the brain would be a more sensitive and specific   examination.          --- End of Report ---           LEANNE GALLEGOS MD; Resident Radiologist  This document has been electronically signed.  CAMILA CARRILLO MD; Attending Radiologist  This document has been electronically signed. Dec 13 2024 12:25AM (12-12-24 @ 22:48)

## 2024-12-16 NOTE — PROGRESS NOTE ADULT - PROBLEM SELECTOR PLAN 1
#bilateral PE   #RA clot  #nonhealing breast wound/drainage.  - AC: patient arrived to [facility] ED on heparin gtt. Changed to lovenox SC 12/15  - patient was planned for MIKEY 12/13 am, however she refused. checked cardiac CT.  - 12/13: GOC discussion documented by vascular team and also as noted in admission note with me, ?that patient has very poor insight?, needs repeat GOC risk/benefit discussions. SW consulted, consider psych eval.   - CTA showing likely catheter-associated thrombus in SVC/RA that could potentially embolize.  - 12/14: patient stated she wants to discuss options with vascular team again 12/14. per vascular cards 12/14: patient has now opted for conservative/medical approach. However 12/15: patient stated to me during rounds that she defers her decision to whatever vascular cards recommends > vascular team to follow up on further risks/benefits/GOC discussions, will keep patient NPO after MN Sunday night incase she decides to proceed with vascular procedure on Monday. consider psych eval/ethics consult.   >       > F/U vascular cardiology and surgery teams for further recs ?plan for thrombectomy/embolization? further GOC discussions.   >        >  Vascular cards rec switching to lovenox > Hem Onc team in agreement, awaiting accurate weight to dose Lovenox, RN aware.   - BL LE duplex US: No evidence of deep venous thrombosis in either lower extremity.   - Case discussed with breast surgery attending and they rec local wound care as of 12/13 and 12/14 and that patient needs to follow up with outpatient Dr. Mack Major.  - IR consulted regarding question of mediport/alternative access for chemo.    - call cardiology/PERT team if becomes hypotensive, may need TPA at that point.  - daily bmp for Mg>2, K>4.  - Telemetry. #bilateral PE   #RA clot  #nonhealing breast wound/drainage.  - AC: patient arrived to [facility] ED on heparin gtt  - patient was planned for MIKEY 12/13 am, however she refused. checked cardiac CT.  - CTA showing likely catheter-associated thrombus in SVC/RA that could potentially embolize.  - 12/14: per vascular cards - pt opted for conservative/medical approach  - 12/15: patient stated to hospitalist during rounds that she defers her decision to whatever vascular cards recommends > vascular team to follow up on further risks/benefits/GOC discussions, will keep patient NPO after MN Sunday night incase she decides to proceed with vascular procedure on Monday. consider psych eval/ethics consult.  - 12/16 pt states she takes her words back about not wanting the vascular procedure now. Requesting to speak with vascular team again to go through with the procedure  - Vascular cards rec switching to lovenox > Hem Onc team in agreement 12/15  - BL LE duplex US: No evidence of deep venous thrombosis in either lower extremity.   - IR consulted regarding question of mediport/alternative access for chemo.    - call cardiology/PERT team if becomes hypotensive, may need TPA at that point.  - daily bmp for Mg>2, K>4.  - Telemetry

## 2024-12-16 NOTE — PROGRESS NOTE ADULT - PROBLEM SELECTOR PLAN 6
DVT PPX: on Heparin gtt  Diet: Regular       Dietician/ Nutritionist Eval.   OOBTC, ambulate as tolerated. Fall precautions. Aspiration precautions. Delirium prevention measures, Promote sleep hygiene. Incentive spirometry.   # Dispo: pending clinical improvement, anticipate discharge back to home, F/U CM.  # Med recs done per notes from [facility], patient arrived on heparin gtt. DVT PPX: on lovenox SC  Diet: NPO until vasc eval for PE   OOBTC, ambulate as tolerated. Fall precautions. Aspiration precautions. Delirium prevention measures, Promote sleep hygiene. Incentive spirometry.   Dispo: pt agreeable to thrombectomy 12/16. f/u vas recs

## 2024-12-16 NOTE — PROGRESS NOTE ADULT - ASSESSMENT
38 year old female with  newly diagnosed poorly differentiated breast ca, recently started neoadjuvant chemotherapy at [facility] presented to [facility] with complaint of R sided back pain with dyspnea , found to have  bilateral PE with possible clot in right atrium, transferred to [facility] for PERT evaluation  ER/hospital course: Being managed for PE. Tmax 100.9 overnight, COVID +, ID asked to evaluate.  Fever, leukocytosis    PE/Infarct  RVP COVID +  UA 4 WBC  Blood cx collected  Fevers likely associated with COVID infection vs PE vs infarct, no apparent alternate infectious source    Overall, COVID, Fever, PE  - RemD x 3 days then DC  - Supportive care for COVID  - Care for clot/PE per team  - Hold on Dexamethasone as long as no progressive O2 requirements  - Wound care to breast  - Monitor for alternate signs infection    Signing off. Please call [phone] or on call fellow with further questions or change in status.     Osvaldo Wang MD  Contact on TEAMS messaging from 9am - 5pm  From 5pm-9am, on weekends, or if no response call [phone]

## 2024-12-16 NOTE — PROGRESS NOTE ADULT - ATTENDING COMMENTS
Events that transpired over the last 24 hours were reviewed.  The patient reports that he is clinically doing better.  Denies any cardiopulmonary complaints at rest or upon minimal exertion.  Denies any fevers, chills, sweats, light sensation, dizzy or palpitations.  He is happy with his improvement in clinical symptoms during his acute hospitalization.  Agree with 14 point review of systems and physical examination as noted above.    The patient was supposed to undergo a MIKEY earlier today.  Multidisciplinary discussion was had with the patient concerning indications for the MIKEY.  The patient refused to undergo the MIKEY.  To assist further in terms of evaluation of clot in SVC/RA plan for patient to undergo heart CTA later today.  She does not want to be kept n.p.o. and does not want to undergo any further procedure/intervention at this time.  It was explained to the patient that there is concern that she has a large clot in her SVC/right atrium that if it was to embolize it could lead to hemodynamic instability and potentially death.  Explained to the patient indications and details for possible percutaneous catheter-based intervention which due to location of the thrombus will need to be done under MIKEY guidance/general anesthesia.  Benefits and risks of continuation with medical management/anticoagulation along with percutaneous catheter-based thrombectomy were reviewed.  Risks of the procedure were discussed which includes infection, bleeding, arrhythmia, TIA/stroke, hemodynamic instability, pericardial effusion/tamponade, need for urgent surgery and death.    Indications for anticoagulation therapy were gone over with the patient.  Risks of anticoagulation were discussed.  Long-term anticoagulation therapy as per oncology team.    Recommend patient be seen/evaluated by interventional radiology team if PORT may be able to be removed if indicated/necessary if patient was to undergo mechanical thrombectomy.    Appreciate assistance from oncology team to help determine patient overall prognosis.    Appreciate assistance from surgical oncology team to assess with worsening drainage from core biopsy site.    Greatly appreciate assistance/coordination care from hospitalist team.    All questions and concerns of the patient were addressed.    45 minutes were spent on this encounter for extensive review of medical record details including labs and/or imaging studies and/or adjacent care team and consultant records, as well as review and reconciliation of current medications. Time was spent on obtaining a history, performing physical examination of patient, and answering patient and/or family questions regarding plan of care. Time was also spent discussing plan of care with patient’s other care team members including primary and consulting teams. Time also was spent on documentation of this encounter into the EHR.
Events of transpired over the last 24 hours reviewed.  The patient is currently on enoxaparin.  She denies any blood in her stool or urine.  The patient reports that she is clinically doing well.  Does not have any significant cardiopulmonary complaints at rest or upon exertion.  Denies any fevers, chills, sweats, light sensation, dizzy or palpitations.  Agree with 14 point review of systems and physical examination as noted above.    Assessment/plan  Poorly differentiated breast cancer T4 N3, triple negative  --Large necrotic left breast mass  --Necrotic left subpectoral and left axillary lymph nodes  Low risk pulmonary embolism  Thrombus on Mediport extending into the right atrium  COVID positive    Further discussions were had by myself and cardiac surgery/ with the patient and her mother who was at her bedside reviewing her clinical condition, imaging on studies today and potential various treatment options. Etiologies of the patient's clot was gone over.  The various treatment options were again gone over in terms of management of her pulmonary embolism and thrombus on right sided Mediport.  The potential acute/subacute/chronic complications of her pulmonary embolism was gone over.  The pros/cons and risk/benefits of the various treatment options were gone over.  The patient has a low risk pulmonary embolism.  It is recommended that she be treated in a conservative/medical fashion for her pulmonary embolism.    The potential acute/subacute/chronic risk of the clot on the Mediport was gone over.  Explained to the patient the pros/cons of being treated in a conservative/medical fashion along with the pros/cons of mechanical thrombectomy of clot on Mediport.  Indications for mechanical thrombectomy were gone over.  The patient is happy with her progress.  She is aware that it will take time for the thrombus to resolve and there is a risk that it may not completely dissolve despite anticoagulation therapy.  The patient and her mother wishes that miss Solano be  treated in a conservative/medical fashion for her clot on her Mediport.  Continue to closely monitor for signs and symptoms of bleeding.      The patient is currently on Lovenox 100 mg every 12 hours.  Would recommend patient be transition to oral anticoagulant/Eliquis as per oncology team.    All questions concerns of the patient and her mother were addressed.    Findings and plan were discussed with cardiac surgery/Dr. Alex, hospitalist and oncology teams.    35 minutes were spent on this encounter for extensive review of medical record details including labs and/or imaging studies and/or adjacent care team and consultant records, as well as review and reconciliation of current medications. Time was spent on obtaining a history, performing physical examination of patient, and answering patient and/or family questions regarding plan of care. Time was also spent discussing plan of care with patient’s other care team members including primary and consulting teams. Time also was spent on documentation of this encounter into the EHR.
37 yo F with recent diagnosis of breast cancer 11/7/2024 (told was curable with chemo and surgery, last chemo 5 days ago, has R port in place), presented from [facility] for bilateral PE, transferred to [facility] for further eval. PE is small and the patient has no current hemodynamic compromise, however CTA is showing likely catheter-associated thrombus in SVC/RA that could potentially embolize. The risk of this embolization must be weighed against the risk of bleeding into necrotic breast mass, as well as the risk of any further intervention such as thrombectomy. Given that the patient is currently HD stable, will favor monitoring on anticoagulation.     continue heparin gtt with therapeutic PTT goal  - call PERT team if becomes hypotensive, consider further embolization of thrombus causing hemodynamically significant PE

## 2024-12-16 NOTE — PROGRESS NOTE ADULT - ASSESSMENT
Patient is a 38 year old female with  newly diagnosed poorly differentiated breast ca, recently started neoadjuvant chemotherapy at [facility] presented to [facility] with complaint of R sided back pain with dyspnea , found to have  bilateral PE with possible clot in right atrium, transferred to [facility] for PERT evaluation.

## 2024-12-16 NOTE — PROGRESS NOTE ADULT - SUBJECTIVE AND OBJECTIVE BOX
Vascular Cardiology  Progress note  DIRECT SERVICE NUMBER: [phone]               INTERVAL HISTORY:  AF HR 90s-100s BP 90s-130s/50s-80s satting 94% 1L NC   Standing weight 214 lbs   9.47 < 8.1 (from 9.1) > 422 MCV 73.6  PT 15.7 aPTT 30.2 INR 1.38   136/4.1 | 99/21 | 13/0.62 < 140   17/20 | 90 | <0.1 | 6.3/2.5   hstrop < 6, bnp 88     No Known Allergies  	  MEDICATIONS:  enoxaparin Injectable 100 milliGRAM(s) SubCutaneous every 12 hours    remdesivir  IVPB   IV Intermittent   remdesivir  IVPB 100 milliGRAM(s) IV Intermittent every 24 hours    benzonatate 100 milliGRAM(s) Oral three times a day PRN    acetaminophen     Tablet .. 1000 milliGRAM(s) Oral every 8 hours PRN    folic acid 1 milliGRAM(s) Oral daily  lidocaine   4% Patch 2 Patch Transdermal daily    PAST MEDICAL & SURGICAL HISTORY:  Breast mass    FAMILY HISTORY:    SOCIAL HISTORY:  unchanged    REVIEW OF SYSTEMS: 14pt ROS neg unless stated above	    [ x] All others negative	  [ ] Unable to obtain    PHYSICAL EXAM:  T(C): 36.8 (12-16-24 @ 09:06), Max: 36.9 (12-15-24 @ 12:25)  HR: 91 (12-16-24 @ 09:06) (91 - 109)  BP: 115/62 (12-16-24 @ 09:06) (96/57 - 135/84)  RR: 18 (12-16-24 @ 09:06) (18 - 18)  SpO2: 94% (12-16-24 @ 09:06) (93% - 99%)  Wt(kg): --  I&O's Summary    15 Dec 2024 07:01  -  16 Dec 2024 07:00  --------------------------------------------------------  IN: 192 mL / OUT: 0 mL / NET: 192 mL        Appearance: Normal	  HEENT:   Normal oral mucosa, PERRL, EOMI	  Carotid:  Right: No bruit    Left:  No bruit  Lymphatic: No lymphadenopathy  Cardiovascular: Normal S1 S2, No JVD, No murmurs, No edema  Respiratory: Lungs clear to auscultation	  Psychiatry: A & O x 3, Mood & affect appropriate  Gastrointestinal:  Soft, Non-tender, + BS	  Skin: No rashes, No ecchymoses, No cyanosis	  Neurologic: Non-focal  Extremities: Normal range of motion, No clubbing, cyanosis.  Vascular:   Right Femoral:  Palpable   Left Femoral:  Palpable  Right Popliteal: Palpable   Left Popliteal:  palpable  Right DP:  Palpable             Left DP:  Palpable  Right PT:  Palpable              Left PT:  Palpable      LABS:	 	    CBC Full  -  ( 16 Dec 2024 07:20 )  WBC Count : 9.47 K/uL  Hemoglobin : 8.1 g/dL  Hematocrit : 26.5 %  Platelet Count - Automated : 422 K/uL  Mean Cell Volume : 73.6 fl  Mean Cell Hemoglobin : 22.5 pg  Mean Cell Hemoglobin Concentration : 30.6 g/dL  Auto Neutrophil # : x  Auto Lymphocyte # : x  Auto Monocyte # : x  Auto Eosinophil # : x  Auto Basophil # : x  Auto Neutrophil % : x  Auto Lymphocyte % : x  Auto Monocyte % : x  Auto Eosinophil % : x  Auto Basophil % : x    12-16    136  |  99  |  13  ----------------------------<  140[H]  4.1   |  21[L]  |  0.62  12-15    x   |  x   |  x   ----------------------------<  x   x    |  x   |  0.51    Ca    9.0      16 Dec 2024 07:20  Ca    8.5      14 Dec 2024 12:22  Phos  3.2     12-16  Mg     2.0     12-16    TPro  6.3  /  Alb  2.5[L]  /  TBili  0.2  /  DBili  <0.1  /  AST  17  /  ALT  20  /  AlkPhos  90  12-16  TPro  6.4  /  Alb  2.2[L]  /  TBili  0.3  /  DBili  <0.1  /  AST  24  /  ALT  20  /  AlkPhos  98  12-15    TTE 12/12  CONCLUSIONS:   1. Technically difficulty study.   2. Left ventricular endocardium is not well visualized; however, the left ventricular systolic function appears grossly normal.   3. There is poor visualization of all the endocardial borders to determine the presence of wall motion abnormalities. Probably normal.   4. The right ventricle is not well visualized. Based on visual assessment, the right ventricle appears normal in size and right ventricular systolic function is normal.   5. Unable to exclude the presence of right atrium, superior or inferior vena cava mass.   6. No significant valvular disease within study limitations.   7. No prior echocardiogram is available for comparison.    DVTs neg    12/13 CT  Impression:  Thrombus associated with the distal tip of the port in the SVC extends   into the right atrium. Thrombus measures up to 15 mm x 15 mm in cross   section within the SVC and extends approximately 65 mm inferiorly into   the right atrial cavity. Findings reviewed with Dr. Russ Foster on   12.13.2024 at 15:18 with Teams.  Pulmonary embolus within the right lower lobe pulmonary artery with   associated fairly large infarct in the right lower lobe. Vascular Cardiology  Progress note  DIRECT SERVICE NUMBER: [phone]               INTERVAL HISTORY:  AF HR 90s-100s BP 90s-130s/50s-80s satting 94% 1L NC   Standing weight 214 lbs   9.47 < 8.1 (from 9.1) > 422 MCV 73.6  PT 15.7 aPTT 30.2 INR 1.38   136/4.1 | 99/21 | 13/0.62 < 140   17/20 | 90 | <0.1 | 6.3/2.5   hstrop < 6, bnp 88  Very anxious at bedside. Oscillating decisions on whether she wants conservative medical management vs. thrombectomy      No Known Allergies  	  MEDICATIONS:  enoxaparin Injectable 100 milliGRAM(s) SubCutaneous every 12 hours    remdesivir  IVPB   IV Intermittent   remdesivir  IVPB 100 milliGRAM(s) IV Intermittent every 24 hours    benzonatate 100 milliGRAM(s) Oral three times a day PRN    acetaminophen     Tablet .. 1000 milliGRAM(s) Oral every 8 hours PRN    folic acid 1 milliGRAM(s) Oral daily  lidocaine   4% Patch 2 Patch Transdermal daily    PAST MEDICAL & SURGICAL HISTORY:  Breast mass    FAMILY HISTORY:    SOCIAL HISTORY:  unchanged    REVIEW OF SYSTEMS: 14pt ROS neg unless stated above	    [ x] All others negative	  [ ] Unable to obtain    PHYSICAL EXAM:  T(C): 36.8 (12-16-24 @ 09:06), Max: 36.9 (12-15-24 @ 12:25)  HR: 91 (12-16-24 @ 09:06) (91 - 109)  BP: 115/62 (12-16-24 @ 09:06) (96/57 - 135/84)  RR: 18 (12-16-24 @ 09:06) (18 - 18)  SpO2: 94% (12-16-24 @ 09:06) (93% - 99%)  Wt(kg): --  I&O's Summary    15 Dec 2024 07:01  -  16 Dec 2024 07:00  --------------------------------------------------------  IN: 192 mL / OUT: 0 mL / NET: 192 mL        Appearance: Normal	  HEENT:   Normal oral mucosa, PERRL, EOMI	  Carotid:  Right: No bruit    Left:  No bruit  Lymphatic: No lymphadenopathy  Cardiovascular: Normal S1 S2, No JVD, No murmurs, No edema  Respiratory: Lungs clear to auscultation	  Psychiatry: A & O x 3, Mood & affect appropriate  Gastrointestinal:  Soft, Non-tender, + BS	  Skin: No rashes, No ecchymoses, No cyanosis	  Neurologic: Non-focal  Extremities: Normal range of motion, No clubbing, cyanosis.  Vascular:   Right Femoral:  Palpable   Left Femoral:  Palpable  Right Popliteal: Palpable   Left Popliteal:  palpable  Right DP:  Palpable             Left DP:  Palpable  Right PT:  Palpable              Left PT:  Palpable      LABS:	 	    CBC Full  -  ( 16 Dec 2024 07:20 )  WBC Count : 9.47 K/uL  Hemoglobin : 8.1 g/dL  Hematocrit : 26.5 %  Platelet Count - Automated : 422 K/uL  Mean Cell Volume : 73.6 fl  Mean Cell Hemoglobin : 22.5 pg  Mean Cell Hemoglobin Concentration : 30.6 g/dL  Auto Neutrophil # : x  Auto Lymphocyte # : x  Auto Monocyte # : x  Auto Eosinophil # : x  Auto Basophil # : x  Auto Neutrophil % : x  Auto Lymphocyte % : x  Auto Monocyte % : x  Auto Eosinophil % : x  Auto Basophil % : x    12-16    136  |  99  |  13  ----------------------------<  140[H]  4.1   |  21[L]  |  0.62  12-15    x   |  x   |  x   ----------------------------<  x   x    |  x   |  0.51    Ca    9.0      16 Dec 2024 07:20  Ca    8.5      14 Dec 2024 12:22  Phos  3.2     12-16  Mg     2.0     12-16    TPro  6.3  /  Alb  2.5[L]  /  TBili  0.2  /  DBili  <0.1  /  AST  17  /  ALT  20  /  AlkPhos  90  12-16  TPro  6.4  /  Alb  2.2[L]  /  TBili  0.3  /  DBili  <0.1  /  AST  24  /  ALT  20  /  AlkPhos  98  12-15    TTE 12/12  CONCLUSIONS:   1. Technically difficulty study.   2. Left ventricular endocardium is not well visualized; however, the left ventricular systolic function appears grossly normal.   3. There is poor visualization of all the endocardial borders to determine the presence of wall motion abnormalities. Probably normal.   4. The right ventricle is not well visualized. Based on visual assessment, the right ventricle appears normal in size and right ventricular systolic function is normal.   5. Unable to exclude the presence of right atrium, superior or inferior vena cava mass.   6. No significant valvular disease within study limitations.   7. No prior echocardiogram is available for comparison.    DVTs neg    12/13 CT  Impression:  Thrombus associated with the distal tip of the port in the SVC extends   into the right atrium. Thrombus measures up to 15 mm x 15 mm in cross   section within the SVC and extends approximately 65 mm inferiorly into   the right atrial cavity. Findings reviewed with Dr. Russ Foster on   12.13.2024 at 15:18 with Teams.  Pulmonary embolus within the right lower lobe pulmonary artery with   associated fairly large infarct in the right lower lobe.

## 2024-12-16 NOTE — PROGRESS NOTE ADULT - ASSESSMENT
Assessment:  Poorly differentiated breast cancer T4 N3, triple negative  --Large necrotic left breast mass  --Necrotic left subpectoral and left axillary lymph nodes  Low risk pulmonary embolism  Thrombus on Mediport extending into the right atrium  COVID positive    Plan:   - Conservative management for low risk PE, mediport clot     Note not finalized until cosigned by attendings    Vascular Cardiology Service  DIRECT SERVICE NUMBER [phone]         Assessment:  Poorly differentiated breast cancer T4 N3, triple negative  --Large necrotic left breast mass  --Necrotic left subpectoral and left axillary lymph nodes  Low risk pulmonary embolism.   Thrombus on Mediport extending into the right atrium  COVID positive    Plan:   - Conservative management for low risk PE, mediport clot. Continue lovenox 100mg q12h    Note not finalized until cosigned by attendings    Vascular Cardiology Service  DIRECT SERVICE NUMBER [phone]         Assessment:  Poorly differentiated breast cancer T4 N3, triple negative  --Large necrotic left breast mass  --Necrotic left subpectoral and left axillary lymph nodes  pulmonary embolism w/ Thrombus on Mediport extending into the right atrium  COVID positive    Plan:   - Mediport clot noted. Continue lovenox 100mg q12h for now  - Should SVC clot embolize could further compromise RV function. Patient at this time precontemplative for thrombectomy (originally planned for after MIKEY).     Note not finalized until cosigned by attendings    Vascular Cardiology Service  DIRECT SERVICE NUMBER [phone]         Assessment:  Poorly differentiated breast cancer T4 N3, triple negative  --Large necrotic left breast mass  --Necrotic left subpectoral and left axillary lymph nodes  pulmonary embolism w/ Thrombus on Mediport extending into the right atrium  COVID positive    Plan:   - Mediport clot noted. Continue lovenox 100mg q12h for now  - Should SVC clot embolize could further compromise RV function. She is currently clinically stable and doing well. Patient at this time precontemplative for thrombectomy (originally planned for after MIKEY).     Note not finalized until cosigned by attendings    Vascular Cardiology Service  DIRECT SERVICE NUMBER [phone]

## 2024-12-16 NOTE — PROVIDER CONTACT NOTE (OTHER) - ASSESSMENT
pt A&Ox4. VSS. No events on tele. No complaints of cp, dizziness or sob or other discomfort. IV site assessed and remains WDL. pt is requesting to be put on heparin gtt. education was given on the difference of lovenox and heparin. pt still insists on speaking with a provider.
Pt is A+Ox4, denies feeling warm, denies chills, denies SOB. V/S as charted, sinus tachycardia on telemetry. Pt denies chest pain, palpitations
Pt AOx4. On tele  90s with O2 sat 100% on RA. Seems upset and anxious about her health and her plan of care.

## 2024-12-16 NOTE — PROVIDER CONTACT NOTE (OTHER) - REASON
pt is requesting to be put back on heparin gtt and speak to a provider
Oral temperature 100.9F
Questions regarding care

## 2024-12-16 NOTE — PROGRESS NOTE ADULT - SUBJECTIVE AND OBJECTIVE BOX
CC: F/U for COVID    Saw/spoke to patient. Unchanged. No new complaints.    Allergies  No Known Allergies    ANTIMICROBIALS:  remdesivir  IVPB    remdesivir  IVPB 100 every 24 hours    PE:    Vital Signs Last 24 Hrs  T(C): 36.7 (16 Dec 2024 12:15), Max: 36.9 (15 Dec 2024 22:00)  T(F): 98 (16 Dec 2024 12:15), Max: 98.5 (15 Dec 2024 22:00)  HR: 91 (16 Dec 2024 12:15) (91 - 109)  BP: 119/72 (16 Dec 2024 12:15) (96/57 - 135/84)  RR: 18 (16 Dec 2024 12:15) (18 - 18)  SpO2: 93% (16 Dec 2024 12:15) (87% - 99%)    Gen: AOx3, NAD, non-toxic  CV: Nontachycardic  Resp: Breathing comfortably, RA  Abd: Soft, nontender  IV/Skin: No thrombophlebitis    LABS:                        8.1    9.47  )-----------( 422      ( 16 Dec 2024 07:20 )             26.5     12-16    136  |  99  |  13  ----------------------------<  140[H]  4.1   |  21[L]  |  0.62    Ca    9.0      16 Dec 2024 07:20  Phos  3.2     12-16  Mg     2.0     12-16    TPro  6.3  /  Alb  2.5[L]  /  TBili  0.2  /  DBili  <0.1  /  AST  17  /  ALT  20  /  AlkPhos  90  12-16    Urinalysis Basic - ( 16 Dec 2024 07:20 )    Color: x / Appearance: x / SG: x / pH: x  Gluc: 140 mg/dL / Ketone: x  / Bili: x / Urobili: x   Blood: x / Protein: x / Nitrite: x   Leuk Esterase: x / RBC: x / WBC x   Sq Epi: x / Non Sq Epi: x / Bacteria: x    MICROBIOLOGY:    .Blood BLOOD  12-14-24   No growth at 48 Hours  --  --    RADIOLOGY:    12/14 XR    IMPRESSION:  Hazy opacities bilateral lung bases may represent atelectasis. Infection   is not excluded.

## 2024-12-16 NOTE — PROGRESS NOTE ADULT - ASSESSMENT
Patient is a 38 year old female with  newly diagnosed poorly differentiated breast ca, recently started neoadjuvant chemotherapy at [facility] presented to [facility] with complaint of R sided back pain with dyspnea , found to have  bilateral PE with possible clot in right atrium, transferred to [facility] for PERT evaluation.    1. 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 Nuvia Quezada of [facility]   --locally advanced, w/ good prognosis   --underwent diagnostic mammogram on 10/16/24 which revealed multiple suspicious masses in the left breast and the left axilla.  --underwent biopsy on 11/4/24 which revealed poorly differentiated invasive carcinoma, ER/MI/HER2 negative at 2:00, 6:00 and in the left axilla.  --outpatient CT c/a/p w/   1. Large necrotic left breast mass measuring at least 16.4 cm, not completely included in the field of view. Adjacent left breast mass measuring up to 5.5 cm.  2. Numerous necrotic bulky enlarged left subpectoral and left axillary lymph nodes, compatible with metastasis.  3. No metastatic disease in the abdomen or pelvis.    --s/p Insertion of right-sided power-injectable single-lumen tunneled chest port on 12/06--> with catheter tip in the expected location of the cavoatrial junction  --started treatment on 12/05 w/ Carboplatin AUC5 D1 + Taxol 80mg/m2 D1,8,15 + Keytruda 200mg D1 Q21 days x 4 cycles. Next treatment scheduled for Monday, 12/16.  --no plan for treatment while inpatient  --ongoing care after discharge    2. Leukocytosis, Anemia  --leukocytosis in the setting of new PE  --Hgb 11-11.4 at baseline, now lower d/t chemo.   --Outpatient labs also c/w WILMA, pt w/ %sat of 7. Scheduled to start IV iron in outpatient clinic.  --surgery consulted d/t bleed from L breast biopsy site, noacute surgical intervention indicated, wound care following    3. bilateral PE, new  --Found upon admission to [facility], pt transferred to [facility] for PERT   --Filling defect in first order right lower lobe segmental branch extending into the subsegmental lower lobe branches. Second order pulmonary embolism in the left lower lobe subsegmental branch.   --LE duplex w/ No evidence of deep venous thrombosis in either lower extremity.  --vascular following, NPO after midnight for possible intervention tomorrow 12/14  --was scheduled for TTE this morning but refused, now plan for possible cardiac CT. (as per event note) Patient very anxious at baseline which is why she refused TTE, she is now agreeable to plan of care. D/w team. Per Dr Quezada, pt is odd and has some learning limitations but mostly d/t her anxiety. Please consult psych for ongoing concerns re patients capacity to make decisions.   --was on heparin gtt, and is now on Lovenox. Can transition to Eliquis if no further procedures pending  --call cardiology if becomes hypotensive, consider further embolization of thrombus causing hemodynamically significant PE  will follow,    Charline Breaux NP  Hematology/ Oncology  [facility]  [phone] (office)  [phone] (alt office)  Evenings and weekends please call MD on call or office     Patient is a 38 year old female with  newly diagnosed poorly differentiated breast ca, recently started neoadjuvant chemotherapy at [facility] presented to [facility] with complaint of R sided back pain with dyspnea , found to have  bilateral PE with possible clot in right atrium, transferred to [facility] for PERT evaluation.    1. 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 Nuvia Quezada of [facility]   --locally advanced, w/ good prognosis   --underwent diagnostic mammogram on 10/16/24 which revealed multiple suspicious masses in the left breast and the left axilla.  --underwent biopsy on 11/4/24 which revealed poorly differentiated invasive carcinoma, ER/RI/HER2 negative at 2:00, 6:00 and in the left axilla.  --outpatient CT c/a/p w/   1. Large necrotic left breast mass measuring at least 16.4 cm, not completely included in the field of view. Adjacent left breast mass measuring up to 5.5 cm.  2. Numerous necrotic bulky enlarged left subpectoral and left axillary lymph nodes, compatible with metastasis.  3. No metastatic disease in the abdomen or pelvis.    --s/p Insertion of right-sided power-injectable single-lumen tunneled chest port on 12/06--> with catheter tip in the expected location of the cavoatrial junction  --started treatment on 12/05 w/ Carboplatin AUC5 D1 + Taxol 80mg/m2 D1,8,15 + Keytruda 200mg D1 Q21 days x 4 cycles. Next treatment scheduled for Monday, 12/16.  --no plan for treatment while inpatient  --ongoing care after discharge    2. Leukocytosis, Anemia  --leukocytosis in the setting of new PE  --Hgb 11-11.4 at baseline, now lower d/t chemo.   --Outpatient labs also c/w WILMA, pt w/ %sat of 7. Scheduled to start IV iron in outpatient clinic.  --surgery consulted d/t bleed from L breast biopsy site, no acute surgical intervention indicated, wound care following    3. bilateral PE, new  --Found upon admission to [facility], pt transferred to [facility] for PERT   --Filling defect in first order right lower lobe segmental branch extending into the subsegmental lower lobe branches. Second order pulmonary embolism in the left lower lobe subsegmental branch.   --LE duplex w/ No evidence of deep venous thrombosis in either lower extremity.  --vascular following, NPO after midnight for possible intervention tomorrow 12/14  --was scheduled for TTE this morning but refused, now plan for possible cardiac CT. (as per event note) Patient very anxious at baseline which is why she refused TTE, she is now agreeable to plan of care. D/w team. Per Dr Quezada, pt is odd and has some learning limitations but mostly d/t her anxiety. Please consult psych for ongoing concerns re patients capacity to make decisions.   --was on heparin gtt, and is now on Lovenox. Can transition to Eliquis if no further procedures pending  --call cardiology if becomes hypotensive, consider further embolization of thrombus causing hemodynamically significant PE  will follow,    Charline Breaux NP  Hematology/ Oncology  [facility]  [phone] (office)  [phone] (alt office)  Evenings and weekends please call MD on call or office

## 2024-12-16 NOTE — PROGRESS NOTE ADULT - SUBJECTIVE AND OBJECTIVE BOX
Patient is a 38y old  Female who presents with a chief complaint of PE (15 Dec 2024 13:59)    No acute overnight events  Patient requesting to be put back on heparin gtt this morning    MEDICATIONS  (STANDING):  enoxaparin Injectable 100 milliGRAM(s) SubCutaneous every 12 hours  folic acid 1 milliGRAM(s) Oral daily  lidocaine   4% Patch 2 Patch Transdermal daily  remdesivir  IVPB   IV Intermittent   remdesivir  IVPB 100 milliGRAM(s) IV Intermittent every 24 hours    MEDICATIONS  (PRN):  acetaminophen     Tablet .. 1000 milliGRAM(s) Oral every 8 hours PRN Temp greater or equal to 38C (100.4F), Severe Pain (7 - 10)  benzonatate 100 milliGRAM(s) Oral three times a day PRN Cough      Vital Signs Last 24 Hrs  T(C): 36.6 (16 Dec 2024 04:42), Max: 36.9 (15 Dec 2024 12:25)  T(F): 97.9 (16 Dec 2024 04:42), Max: 98.5 (15 Dec 2024 22:00)  HR: 109 (16 Dec 2024 04:42) (92 - 109)  BP: 135/84 (16 Dec 2024 04:42) (96/57 - 135/84)  BP(mean): --  RR: 18 (16 Dec 2024 04:42) (18 - 18)  SpO2: 96% (16 Dec 2024 04:42) (93% - 99%)    Parameters below as of 16 Dec 2024 04:42  Patient On (Oxygen Delivery Method): nasal cannula  O2 Flow (L/min): 1      PE  Awake  Anicteric, MMM  RRR  CTAB  Abd soft, NT, ND  No c/c                        8.1    9.47  )-----------( 422      ( 16 Dec 2024 07:20 )             26.5       12-15    x   |  x   |  x   ----------------------------<  x   x    |  x   |  0.51    Ca    8.5      14 Dec 2024 12:22    TPro  6.4  /  Alb  2.2[L]  /  TBili  0.3  /  DBili  <0.1  /  AST  24  /  ALT  20  /  AlkPhos  98  12-15

## 2024-12-16 NOTE — PROGRESS NOTE ADULT - PROBLEM SELECTOR PLAN 3
Started chemo 12/5/24, has R port.   Follows Dr. Quezada outpatient.  [facility] consulted on admission. Case discussed with Oncology attending 12/13 , they rec prioritizing AC and vascular intervention, surgery evaluation for bleeding biopsy site, that outpatient oncologist Dr. Quezada agrees with plan for vascular intervention.  -  F/U on further Hem Onc recs. Started chemo 12/5/24, has R port.   Follows Dr. Quezada outpatient.  [facility] consulted on admission. Case discussed with Oncology attending 12/13 , they rec prioritizing AC and vascular intervention, surgery evaluation for bleeding biopsy site, that outpatient oncologist Dr. Quezada agrees with plan for vascular intervention.  -  F/U on further Hem Onc recs  - Case discussed with breast surgery attending and they rec local wound care as of 12/13 and 12/14 and that patient needs to follow up with outpatient Dr. Mack Major.

## 2024-12-16 NOTE — PROVIDER CONTACT NOTE (OTHER) - ACTION/TREATMENT ORDERED:
Provider notified. Provider to come to bedside. No interventions at this time.
provider is notified and aware. provider will defer to day team
BCx2, UA, RVP. Administer prn Tylenol + extra 325mg if fever does not break

## 2024-12-16 NOTE — PROVIDER CONTACT NOTE (OTHER) - SITUATION
Pt has several questions regarding her care and is visibly upset. Asked to speak to provider.
pt is requesting to be put back on heparin gtt and speak to a provider
Oral temperature 100.9F

## 2024-12-17 LAB
ALBUMIN SERPL ELPH-MCNC: 2.5 G/DL — LOW (ref 3.3–5)
ALP SERPL-CCNC: 88 U/L — SIGNIFICANT CHANGE UP (ref 40–120)
ALT FLD-CCNC: 18 U/L — SIGNIFICANT CHANGE UP (ref 10–45)
ANION GAP SERPL CALC-SCNC: 17 MMOL/L — SIGNIFICANT CHANGE UP (ref 5–17)
AST SERPL-CCNC: 17 U/L — SIGNIFICANT CHANGE UP (ref 10–40)
BASOPHILS # BLD AUTO: 0.03 K/UL — SIGNIFICANT CHANGE UP (ref 0–0.2)
BASOPHILS NFR BLD AUTO: 0.4 % — SIGNIFICANT CHANGE UP (ref 0–2)
BILIRUB SERPL-MCNC: 0.2 MG/DL — SIGNIFICANT CHANGE UP (ref 0.2–1.2)
BUN SERPL-MCNC: 15 MG/DL — SIGNIFICANT CHANGE UP (ref 7–23)
CALCIUM SERPL-MCNC: 8.8 MG/DL — SIGNIFICANT CHANGE UP (ref 8.4–10.5)
CHLORIDE SERPL-SCNC: 100 MMOL/L — SIGNIFICANT CHANGE UP (ref 96–108)
CO2 SERPL-SCNC: 23 MMOL/L — SIGNIFICANT CHANGE UP (ref 22–31)
CREAT SERPL-MCNC: 0.57 MG/DL — SIGNIFICANT CHANGE UP (ref 0.5–1.3)
EGFR: 118 ML/MIN/1.73M2 — SIGNIFICANT CHANGE UP
EOSINOPHIL # BLD AUTO: 0.07 K/UL — SIGNIFICANT CHANGE UP (ref 0–0.5)
EOSINOPHIL NFR BLD AUTO: 0.9 % — SIGNIFICANT CHANGE UP (ref 0–6)
GLUCOSE SERPL-MCNC: 108 MG/DL — HIGH (ref 70–99)
HCT VFR BLD CALC: 25.7 % — LOW (ref 34.5–45)
HGB BLD-MCNC: 7.7 G/DL — LOW (ref 11.5–15.5)
IMM GRANULOCYTES NFR BLD AUTO: 1.5 % — HIGH (ref 0–0.9)
LYMPHOCYTES # BLD AUTO: 1.53 K/UL — SIGNIFICANT CHANGE UP (ref 1–3.3)
LYMPHOCYTES # BLD AUTO: 20.4 % — SIGNIFICANT CHANGE UP (ref 13–44)
MCHC RBC-ENTMCNC: 22.3 PG — LOW (ref 27–34)
MCHC RBC-ENTMCNC: 30 G/DL — LOW (ref 32–36)
MCV RBC AUTO: 74.5 FL — LOW (ref 80–100)
MONOCYTES # BLD AUTO: 0.99 K/UL — HIGH (ref 0–0.9)
MONOCYTES NFR BLD AUTO: 13.2 % — SIGNIFICANT CHANGE UP (ref 2–14)
NEUTROPHILS # BLD AUTO: 4.77 K/UL — SIGNIFICANT CHANGE UP (ref 1.8–7.4)
NEUTROPHILS NFR BLD AUTO: 63.6 % — SIGNIFICANT CHANGE UP (ref 43–77)
NRBC # BLD: 0 /100 WBCS — SIGNIFICANT CHANGE UP (ref 0–0)
PLATELET # BLD AUTO: 458 K/UL — HIGH (ref 150–400)
POTASSIUM SERPL-MCNC: 3.9 MMOL/L — SIGNIFICANT CHANGE UP (ref 3.5–5.3)
POTASSIUM SERPL-SCNC: 3.9 MMOL/L — SIGNIFICANT CHANGE UP (ref 3.5–5.3)
PROT SERPL-MCNC: 6.2 G/DL — SIGNIFICANT CHANGE UP (ref 6–8.3)
RBC # BLD: 3.45 M/UL — LOW (ref 3.8–5.2)
RBC # FLD: 16.7 % — HIGH (ref 10.3–14.5)
SODIUM SERPL-SCNC: 140 MMOL/L — SIGNIFICANT CHANGE UP (ref 135–145)
WBC # BLD: 7.5 K/UL — SIGNIFICANT CHANGE UP (ref 3.8–10.5)
WBC # FLD AUTO: 7.5 K/UL — SIGNIFICANT CHANGE UP (ref 3.8–10.5)

## 2024-12-17 PROCEDURE — 99233 SBSQ HOSP IP/OBS HIGH 50: CPT

## 2024-12-17 RX ORDER — GUAIFENESIN 400 MG
200 TABLET ORAL ONCE
Refills: 0 | Status: COMPLETED | OUTPATIENT
Start: 2024-12-17 | End: 2024-12-17

## 2024-12-17 RX ORDER — GUAIFENESIN 400 MG
100 TABLET ORAL ONCE
Refills: 0 | Status: COMPLETED | OUTPATIENT
Start: 2024-12-17 | End: 2024-12-17

## 2024-12-17 RX ADMIN — Medication 200 MILLIGRAM(S): at 21:46

## 2024-12-17 RX ADMIN — LIDOCAINE 2 PATCH: 40 CREAM TOPICAL at 12:34

## 2024-12-17 RX ADMIN — BENZONATATE 100 MILLIGRAM(S): 100 CAPSULE ORAL at 20:07

## 2024-12-17 RX ADMIN — ACETAMINOPHEN 500MG 1000 MILLIGRAM(S): 500 TABLET, COATED ORAL at 06:39

## 2024-12-17 RX ADMIN — ENOXAPARIN SODIUM 100 MILLIGRAM(S): 30 INJECTION SUBCUTANEOUS at 06:10

## 2024-12-17 RX ADMIN — Medication 100 MILLIGRAM(S): at 06:34

## 2024-12-17 RX ADMIN — Medication 1 MILLIGRAM(S): at 12:33

## 2024-12-17 RX ADMIN — BENZONATATE 100 MILLIGRAM(S): 100 CAPSULE ORAL at 06:09

## 2024-12-17 RX ADMIN — ACETAMINOPHEN 500MG 1000 MILLIGRAM(S): 500 TABLET, COATED ORAL at 06:09

## 2024-12-17 RX ADMIN — ENOXAPARIN SODIUM 100 MILLIGRAM(S): 30 INJECTION SUBCUTANEOUS at 17:45

## 2024-12-17 NOTE — PROGRESS NOTE ADULT - PROBLEM SELECTOR PLAN 1
#bilateral PE   #RA clot  #nonhealing breast wound/drainage.  - AC: patient arrived to [facility] ED on heparin gtt  - patient was planned for MIKEY 12/13 am, however she refused. checked cardiac CT.  - CTA showing likely catheter-associated thrombus in SVC/RA that could potentially embolize.  - 12/14: per vascular cards - pt opted for conservative/medical approach  - 12/15: patient stated to hospitalist during rounds that she defers her decision to whatever vascular cards recommends > vascular team to follow up on further risks/benefits/GOC discussions, will keep patient NPO after MN Sunday night incase she decides to proceed with vascular procedure on Monday. consider psych eval/ethics consult.  - 12/16 and 12/17 pt states she takes her words back about not wanting the vascular procedure now. Requesting to speak with vascular team again to go through with the procedure - vasc cards fellow contacted, will speak to pt again but at this time believe pt would benefit from conservative/medical management, if no plan for procedure, will transition from lovenox to DOAC  - Vascular cards rec switching to lovenox > Hem Onc team in agreement 12/15  - BL LE duplex US: No evidence of deep venous thrombosis in either lower extremity.   - IR consulted regarding question of mediport/alternative access for chemo. At this time no indication to remove port.  - call cardiology/PERT team if becomes hypotensive, may need TPA at that point.  - daily bmp for Mg>2, K>4.  - Telemetry

## 2024-12-17 NOTE — PROGRESS NOTE ADULT - PROBLEM SELECTOR PLAN 3
Started chemo 12/5/24, has R port.   Follows Dr. Quezada outpatient.  [facility] consulted on admission. Case discussed with Oncology attending 12/13 , they rec prioritizing AC and vascular intervention, surgery evaluation for bleeding biopsy site, that outpatient oncologist Dr. Quezada agrees with plan for vascular intervention.  -  F/U on further Hem Onc recs  - Case discussed with breast surgery attending and they rec local wound care as of 12/13 and 12/14 and that patient needs to follow up with outpatient Dr. Mack Major.

## 2024-12-17 NOTE — PROGRESS NOTE ADULT - PROBLEM SELECTOR PLAN 4
Likely Acute on chronic.  Surgery consulted to evaluate for bleed at left breast biopsy site.   If concerns for active bleeding >> call the surgery team.  Monitor for any other s/s of bleed.   AC as above.  Monitor HH. Keep active T&S.  F/U iron panel, ferritin, b12, folate. started folic acid.   F/U hem onc team for further recs.  Scheduled to start IV iron in outpatient clinic.
Likely Acute on chronic.  Folate deficiency, iron deficiency, normal B12  Started on folate supplementation, plan to start IV iron in outpatient clinic  Surgery consulted to evaluate for bleed at left breast biopsy site >> no acute surgical intervention, local wound care, okay to c/w AC  If concerns for active bleeding >> call the surgery team.  Monitor for any other s/s of bleed.   AC as above.  Monitor HH. Keep active T&S.
Likely Acute on chronic.  Surgery consulted to evaluate for bleed at left breast biopsy site.   If concerns for active bleeding >> call the surgery team.  Monitor for any other s/s of bleed.   AC as above.  Monitor HH. Keep active T&S.  F/U iron panel, ferritin, b12, folate. started folic acid.   F/U hem onc team for further recs.  Scheduled to start IV iron in outpatient clinic.
Likely Acute on chronic.  Surgery consulted to evaluate for bleed at left breast biopsy site.   If concerns for active bleeding >> call the surgery team.  Monitor for any other s/s of bleed.   AC as above.  Monitor HH. Keep active T&S.  F/U iron panel, ferritin, b12, folate. started folic acid.   F/U hem onc team for further recs.  Scheduled to start IV iron in outpatient clinic.

## 2024-12-17 NOTE — PROGRESS NOTE ADULT - SUBJECTIVE AND OBJECTIVE BOX
[facility] Division of Hospital Medicine  Tammie Loving MD  Available on Teams Lucien    Patient is a 38y old  Female who presents with a chief complaint of PE (17 Dec 2024 11:46)      SUBJECTIVE / OVERNIGHT EVENTS:  Patient seen and evaluated at bedside this AM, with mother present. States she is now interested and requesting to receive thrombectomy. Denies any f/c, CP, SOB, abd pain. Notes slight purulent discharge from L breast.     ADDITIONAL REVIEW OF SYSTEMS: negative    MEDICATIONS  (STANDING):  enoxaparin Injectable 100 milliGRAM(s) SubCutaneous every 12 hours  folic acid 1 milliGRAM(s) Oral daily  lidocaine   4% Patch 2 Patch Transdermal daily    MEDICATIONS  (PRN):  acetaminophen     Tablet .. 1000 milliGRAM(s) Oral every 8 hours PRN Temp greater or equal to 38C (100.4F), Severe Pain (7 - 10)  benzonatate 100 milliGRAM(s) Oral three times a day PRN Cough      CAPILLARY BLOOD GLUCOSE        I&O's Summary    16 Dec 2024 07:01  -  17 Dec 2024 07:00  --------------------------------------------------------  IN: 1030 mL / OUT: 0 mL / NET: 1030 mL        PHYSICAL EXAM:  Vital Signs Last 24 Hrs  T(C): 36.3 (17 Dec 2024 12:12), Max: 37.1 (17 Dec 2024 00:00)  T(F): 97.3 (17 Dec 2024 12:12), Max: 98.8 (17 Dec 2024 00:00)  HR: 74 (17 Dec 2024 12:12) (74 - 99)  BP: 103/61 (17 Dec 2024 12:12) (103/61 - 121/69)  BP(mean): --  RR: 18 (17 Dec 2024 12:12) (18 - 18)  SpO2: 96% (17 Dec 2024 12:12) (94% - 96%)    Parameters below as of 17 Dec 2024 12:12  Patient On (Oxygen Delivery Method): room air      CONSTITUTIONAL: Well-groomed, in no apparent distress  EYES: No conjunctival or scleral injection, non-icteric; PERRLA and symmetric  ENMT: Oral mucosa with moist membranes  RESPIRATORY: Breathing comfortably; lungs CTA without wheeze/rhonchi/rales  CARDIOVASCULAR: +S1S2, RRR, no M/G/R; pedal pulses full and symmetric; no lower extremity edema  GASTROINTESTINAL: No palpable masses or tenderness, +BS throughout, no rebound/guarding  MUSCULOSKELETAL: no digital clubbing or cyanosis  SKIN: L breast with thickening of skin, slight purulent discharge on gauze. R port site without active drainage.   NEUROLOGIC: CN II-XII intact; sensation intact in LEs b/l to light touch  PSYCHIATRIC: A+O x 3; mood and affect appropriate    LABS:                        7.7    7.50  )-----------( 458      ( 17 Dec 2024 07:27 )             25.7     12-17    140  |  100  |  15  ----------------------------<  108[H]  3.9   |  23  |  0.57    Ca    8.8      17 Dec 2024 07:27  Phos  3.2     12-16  Mg     2.0     12-16    TPro  6.2  /  Alb  2.5[L]  /  TBili  0.2  /  DBili  x   /  AST  17  /  ALT  18  /  AlkPhos  88  12-17    PT/INR - ( 16 Dec 2024 07:20 )   PT: 15.7 sec;   INR: 1.38 ratio         PTT - ( 16 Dec 2024 07:20 )  PTT:30.2 sec      Urinalysis Basic - ( 17 Dec 2024 07:27 )    Color: x / Appearance: x / SG: x / pH: x  Gluc: 108 mg/dL / Ketone: x  / Bili: x / Urobili: x   Blood: x / Protein: x / Nitrite: x   Leuk Esterase: x / RBC: x / WBC x   Sq Epi: x / Non Sq Epi: x / Bacteria: x          RADIOLOGY & ADDITIONAL TESTS:  Results Reviewed:   Imaging Personally Reviewed:  Electrocardiogram Personally Reviewed:    COORDINATION OF CARE:  Care Discussed with Consultants/Other Providers [Y/N]:  Prior or Outpatient Records Reviewed [Y/N]:

## 2024-12-17 NOTE — PROGRESS NOTE ADULT - PROBLEM SELECTOR PLAN 5
Recheck BMP. Na improved.   Check serum osm, urine osm, urine Na.    # hypokalemia: replace with K supplements.

## 2024-12-17 NOTE — PROGRESS NOTE ADULT - ASSESSMENT
Patient is a 38 year old female with  newly diagnosed poorly differentiated breast ca, recently started neoadjuvant chemotherapy at [facility] presented to [facility] with complaint of R sided back pain with dyspnea , found to have  bilateral PE with possible clot in right atrium, transferred to [facility] for PERT evaluation.

## 2024-12-17 NOTE — PROGRESS NOTE ADULT - SUBJECTIVE AND OBJECTIVE BOX
Patient is a 38y old  Female who presents with a chief complaint of PE (16 Dec 2024 10:13)    Patient seen and examined  Appears comfortable, no new complaints    MEDICATIONS  (STANDING):  enoxaparin Injectable 100 milliGRAM(s) SubCutaneous every 12 hours  folic acid 1 milliGRAM(s) Oral daily  lidocaine   4% Patch 2 Patch Transdermal daily    MEDICATIONS  (PRN):  acetaminophen     Tablet .. 1000 milliGRAM(s) Oral every 8 hours PRN Temp greater or equal to 38C (100.4F), Severe Pain (7 - 10)  benzonatate 100 milliGRAM(s) Oral three times a day PRN Cough      Vital Signs Last 24 Hrs  T(C): 36.4 (17 Dec 2024 08:10), Max: 37.1 (17 Dec 2024 00:00)  T(F): 97.6 (17 Dec 2024 08:10), Max: 98.8 (17 Dec 2024 00:00)  HR: 79 (17 Dec 2024 08:10) (79 - 99)  BP: 110/68 (17 Dec 2024 08:10) (110/68 - 121/69)  BP(mean): --  RR: 18 (17 Dec 2024 08:10) (18 - 18)  SpO2: 95% (17 Dec 2024 08:10) (87% - 95%)    Parameters below as of 17 Dec 2024 08:10  Patient On (Oxygen Delivery Method): room air        PE  Awake, alert  Anicteric, MMM  RRR  CTAB  Abd soft, NT, ND  No c/c                        7.7    7.50  )-----------( 458      ( 17 Dec 2024 07:27 )             25.7       12-17    140  |  100  |  15  ----------------------------<  108[H]  3.9   |  23  |  0.57    Ca    8.8      17 Dec 2024 07:27  Phos  3.2     12-16  Mg     2.0     12-16    TPro  6.2  /  Alb  2.5[L]  /  TBili  0.2  /  DBili  x   /  AST  17  /  ALT  18  /  AlkPhos  88  12-17

## 2024-12-17 NOTE — PROGRESS NOTE ADULT - PROBLEM SELECTOR PLAN 6
DVT PPX: on lovenox SC  Diet: Regular diet, A1c 5.9 prediabetes  OOBTC, ambulate as tolerated. Fall precautions. Aspiration precautions. Delirium prevention measures, Promote sleep hygiene. Incentive spirometry.   Dispo: pt agreeable to thrombectomy 12/17. f/u vasc recs, if no plan for procedure, transition to DOAC, dc planning

## 2024-12-17 NOTE — PROGRESS NOTE ADULT - ASSESSMENT
Patient is a 38 year old female with  newly diagnosed poorly differentiated breast ca, recently started neoadjuvant chemotherapy at [facility] presented to [facility] with complaint of R sided back pain with dyspnea , found to have  bilateral PE with possible clot in right atrium, transferred to [facility] for PERT evaluation.    1. 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 Nuvia Quezada of [facility]   --locally advanced, w/ good prognosis   --underwent diagnostic mammogram on 10/16/24 which revealed multiple suspicious masses in the left breast and the left axilla.  --underwent biopsy on 11/4/24 which revealed poorly differentiated invasive carcinoma, ER/HI/HER2 negative at 2:00, 6:00 and in the left axilla.  --outpatient CT c/a/p w/   1. Large necrotic left breast mass measuring at least 16.4 cm, not completely included in the field of view. Adjacent left breast mass measuring up to 5.5 cm.  2. Numerous necrotic bulky enlarged left subpectoral and left axillary lymph nodes, compatible with metastasis.  3. No metastatic disease in the abdomen or pelvis.    --s/p Insertion of right-sided power-injectable single-lumen tunneled chest port on 12/06--> with catheter tip in the expected location of the cavoatrial junction  --started treatment on 12/05 w/ Carboplatin AUC5 D1 + Taxol 80mg/m2 D1,8,15 + Keytruda 200mg D1 Q21 days x 4 cycles.   --no plan for treatment while inpatient  --ongoing care after discharge    2. Leukocytosis, Anemia  --leukocytosis in the setting of new PE  --Hgb 11-11.4 at baseline, now lower d/t chemo.   --Outpatient labs also c/w WILMA, pt w/ %sat of 7. Scheduled to start IV iron in outpatient clinic.  --surgery consulted d/t bleed from L breast biopsy site, no acute surgical intervention indicated, wound care following    3. bilateral PE, new  --Found upon admission to [facility], pt transferred to [facility] for PERT   --Filling defect in first order right lower lobe segmental branch extending into the subsegmental lower lobe branches. Second order pulmonary embolism in the left lower lobe subsegmental branch.   --LE duplex w/ No evidence of deep venous thrombosis in either lower extremity.  --vascular following, NPO after midnight for possible intervention tomorrow 12/14  --was scheduled for TTE this morning but refused, now plan for possible cardiac CT. (as per event note) Patient very anxious at baseline which is why she refused TTE, she is now agreeable to plan of care. D/w team. Per Dr Quezada, pt is odd and has some learning limitations but mostly d/t her anxiety. Please consult psych for ongoing concerns re patients capacity to make decisions.   --was on heparin gtt, and is now on Lovenox. Can transition to Eliquis if no further procedures pending  --call cardiology if becomes hypotensive, consider further embolization of thrombus causing hemodynamically significant PE      will follow,    Charline Breaux NP  Hematology/ Oncology  [facility]  [phone] (office)  [phone] (alt office)  Evenings and weekends please call MD on call or office

## 2024-12-17 NOTE — PROGRESS NOTE ADULT - PROBLEM SELECTOR PLAN 2
Febrile overnight Tmax 100.9 on Dec 14.  COVID+, blood cx with NGTD, UA negative, proCal 0.32  Not hypoxic, patient denies any symptoms.   ID input appreciated, likely in setting of PE and COVID, s/p 3d remdesivir completed 12/16, no indication for steroids  Remains afebrile, c/w supportive care for COVID

## 2024-12-17 NOTE — PROGRESS NOTE ADULT - PROVIDER SPECIALTY LIST ADULT
Vascular Cardiology
Surgery
Vascular Cardiology
Vascular Cardiology
Cardiology
Heme/Onc
Infectious Disease
Vascular Cardiology
Heme/Onc
Hospitalist
Hospitalist
Internal Medicine
Internal Medicine

## 2024-12-17 NOTE — PROGRESS NOTE ADULT - NS ATTEND AMEND GEN_ALL_CORE FT
Patient seen and examined with the NP during rounds  I agree with her assessment and plan
37 y/o F with TNBC locally advanced breast cancer receiving neoadjuvant keynote 522 regimen, admitted with bilateral PE's and thrombus at distal tip of port in the setting of COVID infection. No DVT on doppler. Started on a heparin gtt. Refused TTE. Transitioned to lovenox from heparin gtt, pt refused. Would just switch to DOAC.

## 2024-12-18 ENCOUNTER — TRANSCRIPTION ENCOUNTER (OUTPATIENT)
Age: 39
End: 2024-12-18

## 2024-12-18 VITALS
RESPIRATION RATE: 18 BRPM | TEMPERATURE: 98 F | SYSTOLIC BLOOD PRESSURE: 115 MMHG | HEART RATE: 89 BPM | OXYGEN SATURATION: 96 % | DIASTOLIC BLOOD PRESSURE: 65 MMHG

## 2024-12-18 LAB
ANION GAP SERPL CALC-SCNC: 13 MMOL/L — SIGNIFICANT CHANGE UP (ref 5–17)
BUN SERPL-MCNC: 14 MG/DL — SIGNIFICANT CHANGE UP (ref 7–23)
CALCIUM SERPL-MCNC: 9.2 MG/DL — SIGNIFICANT CHANGE UP (ref 8.4–10.5)
CHLORIDE SERPL-SCNC: 100 MMOL/L — SIGNIFICANT CHANGE UP (ref 96–108)
CO2 SERPL-SCNC: 24 MMOL/L — SIGNIFICANT CHANGE UP (ref 22–31)
CREAT SERPL-MCNC: 0.57 MG/DL — SIGNIFICANT CHANGE UP (ref 0.5–1.3)
EGFR: 118 ML/MIN/1.73M2 — SIGNIFICANT CHANGE UP
GLUCOSE SERPL-MCNC: 110 MG/DL — HIGH (ref 70–99)
HCT VFR BLD CALC: 25.5 % — LOW (ref 34.5–45)
HGB BLD-MCNC: 7.6 G/DL — LOW (ref 11.5–15.5)
MCHC RBC-ENTMCNC: 22.4 PG — LOW (ref 27–34)
MCHC RBC-ENTMCNC: 29.8 G/DL — LOW (ref 32–36)
MCV RBC AUTO: 75.2 FL — LOW (ref 80–100)
NRBC # BLD: 0 /100 WBCS — SIGNIFICANT CHANGE UP (ref 0–0)
PLATELET # BLD AUTO: 388 K/UL — SIGNIFICANT CHANGE UP (ref 150–400)
POTASSIUM SERPL-MCNC: 3.9 MMOL/L — SIGNIFICANT CHANGE UP (ref 3.5–5.3)
POTASSIUM SERPL-SCNC: 3.9 MMOL/L — SIGNIFICANT CHANGE UP (ref 3.5–5.3)
RBC # BLD: 3.39 M/UL — LOW (ref 3.8–5.2)
RBC # FLD: 16.7 % — HIGH (ref 10.3–14.5)
SODIUM SERPL-SCNC: 137 MMOL/L — SIGNIFICANT CHANGE UP (ref 135–145)
WBC # BLD: 7.43 K/UL — SIGNIFICANT CHANGE UP (ref 3.8–10.5)
WBC # FLD AUTO: 7.43 K/UL — SIGNIFICANT CHANGE UP (ref 3.8–10.5)

## 2024-12-18 PROCEDURE — 84702 CHORIONIC GONADOTROPIN TEST: CPT

## 2024-12-18 PROCEDURE — 80053 COMPREHEN METABOLIC PANEL: CPT

## 2024-12-18 PROCEDURE — 83615 LACTATE (LD) (LDH) ENZYME: CPT

## 2024-12-18 PROCEDURE — 84132 ASSAY OF SERUM POTASSIUM: CPT

## 2024-12-18 PROCEDURE — 75574 CT ANGIO HRT W/3D IMAGE: CPT | Mod: MC

## 2024-12-18 PROCEDURE — 80048 BASIC METABOLIC PNL TOTAL CA: CPT

## 2024-12-18 PROCEDURE — 84100 ASSAY OF PHOSPHORUS: CPT

## 2024-12-18 PROCEDURE — 84540 ASSAY OF URINE/UREA-N: CPT

## 2024-12-18 PROCEDURE — 85014 HEMATOCRIT: CPT

## 2024-12-18 PROCEDURE — 85018 HEMOGLOBIN: CPT

## 2024-12-18 PROCEDURE — 85025 COMPLETE CBC W/AUTO DIFF WBC: CPT

## 2024-12-18 PROCEDURE — 85027 COMPLETE CBC AUTOMATED: CPT

## 2024-12-18 PROCEDURE — 99239 HOSP IP/OBS DSCHRG MGMT >30: CPT

## 2024-12-18 PROCEDURE — 86140 C-REACTIVE PROTEIN: CPT

## 2024-12-18 PROCEDURE — 82746 ASSAY OF FOLIC ACID SERUM: CPT

## 2024-12-18 PROCEDURE — 82550 ASSAY OF CK (CPK): CPT

## 2024-12-18 PROCEDURE — 86900 BLOOD TYPING SEROLOGIC ABO: CPT

## 2024-12-18 PROCEDURE — 93306 TTE W/DOPPLER COMPLETE: CPT

## 2024-12-18 PROCEDURE — 83605 ASSAY OF LACTIC ACID: CPT

## 2024-12-18 PROCEDURE — 83550 IRON BINDING TEST: CPT

## 2024-12-18 PROCEDURE — 83935 ASSAY OF URINE OSMOLALITY: CPT

## 2024-12-18 PROCEDURE — 84295 ASSAY OF SERUM SODIUM: CPT

## 2024-12-18 PROCEDURE — 82728 ASSAY OF FERRITIN: CPT

## 2024-12-18 PROCEDURE — 99221 1ST HOSP IP/OBS SF/LOW 40: CPT

## 2024-12-18 PROCEDURE — 83880 ASSAY OF NATRIURETIC PEPTIDE: CPT

## 2024-12-18 PROCEDURE — 82803 BLOOD GASES ANY COMBINATION: CPT

## 2024-12-18 PROCEDURE — 99285 EMERGENCY DEPT VISIT HI MDM: CPT

## 2024-12-18 PROCEDURE — 87040 BLOOD CULTURE FOR BACTERIA: CPT

## 2024-12-18 PROCEDURE — 81001 URINALYSIS AUTO W/SCOPE: CPT

## 2024-12-18 PROCEDURE — 82565 ASSAY OF CREATININE: CPT

## 2024-12-18 PROCEDURE — 84145 PROCALCITONIN (PCT): CPT

## 2024-12-18 PROCEDURE — 85610 PROTHROMBIN TIME: CPT

## 2024-12-18 PROCEDURE — 71045 X-RAY EXAM CHEST 1 VIEW: CPT

## 2024-12-18 PROCEDURE — 86850 RBC ANTIBODY SCREEN: CPT

## 2024-12-18 PROCEDURE — 82607 VITAMIN B-12: CPT

## 2024-12-18 PROCEDURE — 87637 SARSCOV2&INF A&B&RSV AMP PRB: CPT

## 2024-12-18 PROCEDURE — 86901 BLOOD TYPING SEROLOGIC RH(D): CPT

## 2024-12-18 PROCEDURE — 36415 COLL VENOUS BLD VENIPUNCTURE: CPT

## 2024-12-18 PROCEDURE — 82330 ASSAY OF CALCIUM: CPT

## 2024-12-18 PROCEDURE — 82947 ASSAY GLUCOSE BLOOD QUANT: CPT

## 2024-12-18 PROCEDURE — 82435 ASSAY OF BLOOD CHLORIDE: CPT

## 2024-12-18 PROCEDURE — 80076 HEPATIC FUNCTION PANEL: CPT

## 2024-12-18 PROCEDURE — 83735 ASSAY OF MAGNESIUM: CPT

## 2024-12-18 PROCEDURE — 70450 CT HEAD/BRAIN W/O DYE: CPT | Mod: MC

## 2024-12-18 PROCEDURE — 85379 FIBRIN DEGRADATION QUANT: CPT

## 2024-12-18 PROCEDURE — 83930 ASSAY OF BLOOD OSMOLALITY: CPT

## 2024-12-18 PROCEDURE — 83036 HEMOGLOBIN GLYCOSYLATED A1C: CPT

## 2024-12-18 PROCEDURE — 93005 ELECTROCARDIOGRAM TRACING: CPT

## 2024-12-18 PROCEDURE — 93970 EXTREMITY STUDY: CPT

## 2024-12-18 PROCEDURE — 84484 ASSAY OF TROPONIN QUANT: CPT

## 2024-12-18 PROCEDURE — 85730 THROMBOPLASTIN TIME PARTIAL: CPT

## 2024-12-18 PROCEDURE — 83540 ASSAY OF IRON: CPT

## 2024-12-18 PROCEDURE — 84300 ASSAY OF URINE SODIUM: CPT

## 2024-12-18 PROCEDURE — 96374 THER/PROPH/DIAG INJ IV PUSH: CPT

## 2024-12-18 RX ORDER — BENZONATATE 100 MG/1
1 CAPSULE ORAL
Qty: 15 | Refills: 0
Start: 2024-12-18

## 2024-12-18 RX ORDER — LIDOCAINE 40 MG/G
2 CREAM TOPICAL
Qty: 10 | Refills: 0
Start: 2024-12-18

## 2024-12-18 RX ORDER — GLUCOSAMINE SULFATE DIPOT CHLR 500 MG
1 CAPSULE ORAL
Qty: 30 | Refills: 0
Start: 2024-12-18

## 2024-12-18 RX ORDER — APIXABAN 2.5 MG/1
10 TABLET, FILM COATED ORAL EVERY 12 HOURS
Refills: 0 | Status: DISCONTINUED | OUTPATIENT
Start: 2024-12-18 | End: 2024-12-18

## 2024-12-18 RX ORDER — APIXABAN 2.5 MG/1
1 TABLET, FILM COATED ORAL
Qty: 60 | Refills: 0
Start: 2024-12-18 | End: 2025-01-16

## 2024-12-18 RX ADMIN — ENOXAPARIN SODIUM 100 MILLIGRAM(S): 30 INJECTION SUBCUTANEOUS at 06:24

## 2024-12-18 RX ADMIN — Medication 1 MILLIGRAM(S): at 12:29

## 2024-12-18 RX ADMIN — LIDOCAINE 2 PATCH: 40 CREAM TOPICAL at 12:29

## 2024-12-18 RX ADMIN — APIXABAN 10 MILLIGRAM(S): 2.5 TABLET, FILM COATED ORAL at 15:29

## 2024-12-18 NOTE — DISCHARGE NOTE NURSING/CASE MANAGEMENT/SOCIAL WORK - PATIENT PORTAL LINK FT
You can access the FollowMyHealth Patient Portal offered by [facility] by registering at the following website: [email or web address]. By joining [employer]’s FollowMyHealth portal, you will also be able to view your health information using other applications (apps) compatible with our system.

## 2024-12-18 NOTE — DISCHARGE NOTE PROVIDER - ATTENDING DISCHARGE PHYSICAL EXAMINATION:
Patient evaluated on day of discharge; plan explained to patient with vascular cardiology and mother at bedside.  Gen: NAD, resting comfortably in bed on RA  Cardiac: RRR, R mediport sign clean/dry  Lungs: CTA bilaterally  Abd: soft, nontender  MSK: no peripheral edema  Skin: L breast with wall thickening, minimal purulent discharge on gauze  Neuro: A&OX3, anxious affect

## 2024-12-18 NOTE — DISCHARGE NOTE PROVIDER - PROVIDER TOKENS
PROVIDER:[TOKEN:[[id number]],FOLLOWUP:[1 week]],PROVIDER:[TOKEN:[[id number]],FOLLOWUP:[1 week]]

## 2024-12-18 NOTE — DISCHARGE NOTE NURSING/CASE MANAGEMENT/SOCIAL WORK - FINANCIAL ASSISTANCE
[facility] provides services at a reduced cost to those who are determined to be eligible through [facility]’s financial assistance program. Information regarding [facility]’s financial assistance program can be found by going to [email or web address] or by calling [phone].

## 2024-12-18 NOTE — DISCHARGE NOTE PROVIDER - CARE PROVIDER_API CALL
Russ Foster  Interventional Cardiology  [address] [place] [id number]  Phone: [phone]  Fax: [phone]  Follow Up Time: 1 week    Nuvia Quezada  Medical Oncology  [address] [place] [id number]  Phone: [phone]  Fax: [phone]  Follow Up Time: 1 week

## 2024-12-18 NOTE — DISCHARGE NOTE PROVIDER - NSDCFUADDAPPT_GEN_ALL_CORE_FT
APPTS ARE READY TO BE MADE: [X] YES    Best Family or Patient Contact (if needed):    Additional Information about above appointments (if needed):    1: Vascular cardiology - Russ Foster MD within 1 week  2: Patient's oncologist Nuvia Quezada within 1 week  3:     Other comments or requests:

## 2024-12-18 NOTE — DISCHARGE NOTE PROVIDER - HOSPITAL COURSE
HPI:  Patient is a 38 year old female with  newly diagnosed poorly differentiated breast ca, recently started neoadjuvant chemotherapy at [facility] presented to [facility] with complaint of R sided back pain with dyspnea , found to have  bilateral PE with possible clot in right atrium, transferred to [facility] for PERT evaluation. At the time of admission, patient states she has noticed some bleeding from left breast biopsy site, but denies any other acute symptoms. She states she was having back pain and dyspnea prior and that was the reason she went to [facility], but it is not resolved. On ROS, she denies headache, dizziness, fever, chills, chest pain, dyspnea, orthopnea, palpitations, abdominal pain, nausea, vomiting, gum bleeding, nose bleeding, hemoptysis blood in stool, hematuria, bruising, any new rash, swelling, weakness, numbness, difficulty ambulating, cough, dysphagia, congestion, hearing change, change in vision, tinnitus, or change in appetite. On ROS she states she feels sad but denies SI/HI. She is eager to eat.       Chart reviewed for previous outside hospital notes, outpatient notes, labs, imaging, specialist notes, ED notes.  (13 Dec 2024 11:49)    Hospital Course:  Patient was kept on heparin gtt and seen by vascular cardiology during her admission. TTE completed 12/12 showed grossly normal LV systolic function, no regional WMA, normal RV and systolic function. No significant valvular disease. Limited to assess for clot in SVC/RA. Patient was recommended for MIKEY, initially planned 12/13 AM but declined. CTA 12/13 showed thrombus associated with the distal tip of the port in the SVC extends into the right atrium. Thrombus measures up to 15 mm x 15 mm in cross section within the SVC and extends approximately 65 mm inferiorly into the right atrial cavity. Pulmonary embolus within the right lower lobe pulmonary artery with associated fairly large infarct in the right lower lobe. BLE duplex negative for DVT. Was evaluated by IR with no indication to remove mediport; okay to receive chemotherapy through mediport. Was also seen by heme/onc with no acute treatment planned while inpatient, but per oncology, no contraindication proceeding with chemotherapy once discharged. Given initial oxygen requirements, clot burden, patient was initially offered thrombectomy however declined and wanted time to decide; wavered over decision and on 12/16 expressed desire for procedure. At that time given pt responding to conservative/medical intervention, vascular cardiology felt best to continue with medical management. Her blood counts remained stable, with known anemia. Scheduled to start IV iron outpatient per heme/onc team. Upon discussion with pt's oncologist, transitioned over to therapeutic lovenox, then to Eliquis for remainder of therapy. Remained hemodynamically stable, with HR in 90s-low 100s, BP within appropriate range, weaned to RA.     Given pt spiked fever of 100.9 overnight, ID was asked to evaluated. Did test COVID positive, saturating on RA, given 3d course of redmesivir, with no further fevers or leukocytosis. Blood cx wtih NGTD.     For patient's necrotic L breast mass, with concern for potential discharge - surgery was consulted, with no acute surgical intervention. Deemed safe to continue anticoagulation given PE and R atrial/mediport clot. She was seen by wound care, recommended to maintain adpatic, aquacel with 4x4 dressing.     Important Medication Changes and Reason:  Eliquis BID  lidocaine patch for back discomfort  Folic acid daily  tessalon perrls 3x/day for cough    Active or Pending Issues Requiring Follow-up:  F/u with cardiology for clot, as well as oncology.     Advanced Directives:   [ X] Full code  [ ] DNR  [ ] Hospice    Discharge Diagnoses:  Bilateral pulmonary emboli, SVC/RA thrombus

## 2024-12-18 NOTE — CONSULT NOTE ADULT - CONSULT REASON
Wound Consult
pert
L breast mass
triple negative breast ca  anemia  new PE
COVID 19/ Fevers
Port removal

## 2024-12-18 NOTE — DISCHARGE NOTE NURSING/CASE MANAGEMENT/SOCIAL WORK - NSDCPEFALRISK_GEN_ALL_CORE
For information on Fall & Injury Prevention, visit: [email or web address] OR  [email or web address] OR  https://www.cdc.gov/steadi/patient.html

## 2024-12-18 NOTE — CONSULT NOTE ADULT - SUBJECTIVE AND OBJECTIVE BOX
Wound SURGERY CONSULT NOTE    HPI:  Patient is a 38 year old female with  newly diagnosed poorly differentiated breast ca, recently started neoadjuvant chemotherapy at [facility] presented to [facility] with complaint of R sided back pain with dyspnea , found to have  bilateral PE with possible clot in right atrium, transferred to [facility] for PERT evaluation. At the time of admission, patient states she has noticed some bleeding from left breast biopsy site, but denies any other acute symptoms. She states she was having back pain and dyspnea prior and that was the reason she went to [facility], but it is not resolved. On ROS, she denies headache, dizziness, fever, chills, chest pain, dyspnea, orthopnea, palpitations, abdominal pain, nausea, vomiting, gum bleeding, nose bleeding, hemoptysis blood in stool, hematuria, bruising, any new rash, swelling, weakness, numbness, difficulty ambulating, cough, dysphagia, congestion, hearing change, change in vision, tinnitus, or change in appetite. On ROS she states she feels sad but denies SI/HI. She is eager to eat.       Chart reviewed for previous outside hospital notes, outpatient notes, labs, imaging, specialist notes, ED notes.  (13 Dec 2024 11:49)      Wound consult requested by team to assist w/ management of lt breast wound.   Pt w/o c/o pain, drainage, odor, color change,  or worsening swelling. Offloading and pericare initiated upon admission as pt Increasingly sedentary 2/2 to illness. Pt is continent of urine & stool. Mother at bedside. All questions asked and answered to pt's and family's expressed understanding and satisfaction.    Current Diet: Diet, Regular:   Supplement Feeding Modality:  Oral  Ensure Max Cans or Servings Per Day:  3       Frequency:  Daily (12-15-24 @ 15:34)      PAST MEDICAL & SURGICAL HISTORY:  Breast mass          REVIEW OF SYSTEMS:   General/ Breast/ Skin/Vasc/ Neuro/ MSK: see HPI  All other systems negative    MEDICATIONS  (STANDING):  apixaban 10 milliGRAM(s) Oral every 12 hours  folic acid 1 milliGRAM(s) Oral daily  lidocaine   4% Patch 2 Patch Transdermal daily    MEDICATIONS  (PRN):  acetaminophen     Tablet .. 1000 milliGRAM(s) Oral every 8 hours PRN Temp greater or equal to 38C (100.4F), Severe Pain (7 - 10)  benzonatate 100 milliGRAM(s) Oral three times a day PRN Cough      Allergies    No Known Allergies    Intolerances        SOCIAL HISTORY:      No hx of smoking, ETOH, drugs    FAMILY HISTORY:    No pertinent family hx among first degree relatives.    PHYSICAL EXAM:  Vital Signs Last 24 Hrs  T(C): 36.7 (18 Dec 2024 12:55), Max: 36.8 (17 Dec 2024 20:08)  T(F): 98 (18 Dec 2024 12:55), Max: 98.2 (17 Dec 2024 20:08)  HR: 93 (18 Dec 2024 12:55) (85 - 103)  BP: 104/69 (18 Dec 2024 12:55) (102/65 - 126/74)  BP(mean): --  RR: 18 (18 Dec 2024 12:55) (18 - 18)  SpO2: 96% (18 Dec 2024 12:55) (94% - 98%)    Parameters below as of 18 Dec 2024 12:55  Patient On (Oxygen Delivery Method): room air        NAD,  A&Ox3/ Alert/ Obese  Versa Care P500 bed  HEENT:  sclera clear, mucosa moist, throat clear, trachea midline, neck supple  Respiratory: nonlabored w/ equal chest rise  Gastrointestinal: soft NT/ND   : Deffered  Neurology:  verbal,  follows commands  Psych: calm/ appropriate  Musculoskeletal: no deformities/ contractures  Vascular: BLE equally warm,  no cyanosis, clubbing, edema nor acute ischemia        Skin:  moist w/ good turgor  Left breast erythema, swollen, hard, dimpled draining yellowish fluid no        no odor, increased warmth, tenderness, induration, fluctuance, nor crepitus    LABS/ CULTURES/ RADIOLOGY:                        7.6    7.43  )-----------( 388      ( 18 Dec 2024 07:07 )             25.5       137  |  100  |  14  ----------------------------<  110      [12-18-24 @ 07:06]  3.9   |  24  |  0.57        Ca     9.2     [12-18-24 @ 07:06]      Phos  3.2     [12-16-24 @ 07:20]    TPro  6.2  /  Alb  2.5  /  TBili  0.2  /  DBili  x   /  AST  17  /  ALT  18  /  AlkPhos  88  [12-17-24 @ 07:27]      A1C with Estimated Average Glucose Result: 5.9 % (12-14-24 @ 12:21)        Culture - Blood (collected 12-14-24 @ 01:46)  Source: .Blood BLOOD  Preliminary Report (12-18-24 @ 09:00):    No growth at 4 days    Culture - Blood (collected 12-14-24 @ 01:46)  Source: .Blood BLOOD  Preliminary Report (12-18-24 @ 09:00):    No growth at 4 days

## 2024-12-18 NOTE — CONSULT NOTE ADULT - ASSESSMENT
A/P:38 year old female with  newly diagnosed poorly differentiated breast ca, [facility] to [facility] with complaint of R sided back pain with dyspnea , found to have  bilateral PE with possible clot in right atrium, transferred to [facility] for PERT evaluation. At the time of admission, patient states she has noticed some bleeding from left breast biopsy site, but denies any other acute symptoms. She states she was having back pain and dyspnea prior and that was the reason she went to [facility], but it is not resolved. On ROS, she denies headache, dizziness, fever, chills, chest pain, dyspnea, orthopnea, palpitations, abdominal pain, nausea, vomiting, gum bleeding, nose bleeding, hemoptysis blood in stool, hematuria, bruising, any new rash, swelling, weakness, numbness, difficulty ambulating, cough, dysphagia, congestion, hearing change, change in vision, tinnitus, or change in appetite.    Wound Consult requested to assist w/ management of  Lt breast wound    Recommendation:   Lt breast: adaptic touch, aquacel, 4x4    Moisturize intact skin w/ SWEEN cream BID  Nutrition Consult for optimization in pt w/  Increased nutritional needs            encourage high quality protein, yumiko/ prosource, MVI & Vit C to promote wound healing  Continue turning and positioning w/ offloading assistive devices as per protocol  Buttocks/ Sacrum Sharmaine BID prophylactic treatment       Continue w/ attends under pads and Pericare as per protocol  Waffle Cushion to chair when oob to chair  Continue w/ low air loss pressure redistribution bed surface   Care as per medicine, will remain available as requested  If needed upon discharge f/u as outpatient at [facility] [address] [place] [address] [phone]  Seen and D/w team & RN  Thank you for this consult,  FLAQUITO Jiang-BC, [facility]  [phone]  Nights/ Weekends/ Holidays please call:  General Surgery Consult pager ([phone]) for emergencies  Wound PT for multilayer leg wrapping or VAC issues ([phone])

## 2024-12-18 NOTE — DISCHARGE NOTE PROVIDER - NSDCMRMEDTOKEN_GEN_ALL_CORE_FT
benzonatate 100 mg oral capsule: 1 cap(s) orally 3 times a day as needed for Cough  Eliquis Starter Pack for Treatment of DVT and PE 5 mg oral tablet: 1 tab(s) orally 2 times a day  folic acid 1 mg oral tablet: 1 tab(s) orally once a day  lidocaine 4% topical film: Apply topically to affected area once a day

## 2024-12-18 NOTE — DISCHARGE NOTE PROVIDER - CARE PROVIDERS DIRECT ADDRESSES
[email or web address]

## 2024-12-18 NOTE — PHARMACOTHERAPY INTERVENTION NOTE - COMMENTS
Eliquis Start Packet prescription(s) sent to [facility] pharmacy. The medications are covered by patient's insurance plan - $0/month.     Micheal (Tyrese Mcnally) Ian - PharmD, BCPS  Transitions of Care Pharmacist  Available on [employer] Teams (Preferred)  Spectra: [phone]

## 2024-12-18 NOTE — CONSULT NOTE ADULT - CONSULT REQUESTED DATE/TIME
14-Dec-2024 09:09
12-Dec-2024 18:32
14-Dec-2024 13:27
12-Dec-2024 20:15
13-Dec-2024 15:10
18-Dec-2024 13:34

## 2024-12-19 ENCOUNTER — NON-APPOINTMENT (OUTPATIENT)
Age: 39
End: 2024-12-19

## 2024-12-19 LAB
CULTURE RESULTS: SIGNIFICANT CHANGE UP
CULTURE RESULTS: SIGNIFICANT CHANGE UP
SPECIMEN SOURCE: SIGNIFICANT CHANGE UP
SPECIMEN SOURCE: SIGNIFICANT CHANGE UP

## 2024-12-23 ENCOUNTER — TRANSCRIPTION ENCOUNTER (OUTPATIENT)
Age: 39
End: 2024-12-23

## 2024-12-31 ENCOUNTER — INPATIENT (INPATIENT)
Facility: HOSPITAL | Age: 39
LOS: 4 days | Discharge: HOME | DRG: 856 | End: 2025-01-05
Attending: INTERNAL MEDICINE | Admitting: INTERNAL MEDICINE
Payer: MEDICAID

## 2024-12-31 VITALS
OXYGEN SATURATION: 100 % | HEART RATE: 127 BPM | RESPIRATION RATE: 20 BRPM | TEMPERATURE: 99 F | DIASTOLIC BLOOD PRESSURE: 70 MMHG | SYSTOLIC BLOOD PRESSURE: 101 MMHG | HEIGHT: 67 IN

## 2024-12-31 DIAGNOSIS — I26.99 OTHER PULMONARY EMBOLISM WITHOUT ACUTE COR PULMONALE: ICD-10-CM

## 2024-12-31 DIAGNOSIS — A41.9 SEPSIS, UNSPECIFIED ORGANISM: ICD-10-CM

## 2024-12-31 DIAGNOSIS — L98.499 NON-PRESSURE CHRONIC ULCER OF SKIN OF OTHER SITES WITH UNSPECIFIED SEVERITY: ICD-10-CM

## 2024-12-31 LAB
ALBUMIN SERPL ELPH-MCNC: 2.1 G/DL — LOW (ref 3.5–5)
ALP SERPL-CCNC: 99 U/L — SIGNIFICANT CHANGE UP (ref 40–120)
ALT FLD-CCNC: 23 U/L DA — SIGNIFICANT CHANGE UP (ref 10–60)
ANION GAP SERPL CALC-SCNC: 12 MMOL/L — SIGNIFICANT CHANGE UP (ref 5–17)
ANISOCYTOSIS BLD QL: SLIGHT — SIGNIFICANT CHANGE UP
APPEARANCE UR: ABNORMAL
APTT BLD: 42.2 SEC — HIGH (ref 24.5–35.6)
AST SERPL-CCNC: 21 U/L — SIGNIFICANT CHANGE UP (ref 10–40)
BACTERIA # UR AUTO: ABNORMAL /HPF
BASOPHILS # BLD AUTO: 0.02 K/UL — SIGNIFICANT CHANGE UP (ref 0–0.2)
BASOPHILS NFR BLD AUTO: 0.2 % — SIGNIFICANT CHANGE UP (ref 0–2)
BILIRUB SERPL-MCNC: 1.2 MG/DL — SIGNIFICANT CHANGE UP (ref 0.2–1.2)
BILIRUB UR-MCNC: ABNORMAL
BUN SERPL-MCNC: 26 MG/DL — HIGH (ref 7–18)
CALCIUM SERPL-MCNC: 9 MG/DL — SIGNIFICANT CHANGE UP (ref 8.4–10.5)
CHLORIDE SERPL-SCNC: 100 MMOL/L — SIGNIFICANT CHANGE UP (ref 96–108)
CO2 SERPL-SCNC: 21 MMOL/L — LOW (ref 22–31)
COLOR SPEC: SIGNIFICANT CHANGE UP
COMMENT - URINE: SIGNIFICANT CHANGE UP
CREAT SERPL-MCNC: 1.17 MG/DL — SIGNIFICANT CHANGE UP (ref 0.5–1.3)
D DIMER BLD IA.RAPID-MCNC: 741 NG/ML DDU — HIGH
DIFF PNL FLD: NEGATIVE — SIGNIFICANT CHANGE UP
EGFR: 60 ML/MIN/1.73M2 — SIGNIFICANT CHANGE UP
EOSINOPHIL # BLD AUTO: 0.02 K/UL — SIGNIFICANT CHANGE UP (ref 0–0.5)
EOSINOPHIL NFR BLD AUTO: 0.2 % — SIGNIFICANT CHANGE UP (ref 0–6)
EPI CELLS # UR: PRESENT
FLUAV AG NPH QL: SIGNIFICANT CHANGE UP
FLUBV AG NPH QL: SIGNIFICANT CHANGE UP
GLUCOSE SERPL-MCNC: 142 MG/DL — HIGH (ref 70–99)
GLUCOSE UR QL: NEGATIVE MG/DL — SIGNIFICANT CHANGE UP
HCG SERPL-ACNC: <1 MIU/ML — SIGNIFICANT CHANGE UP
HCT VFR BLD CALC: 24.7 % — LOW (ref 34.5–45)
HGB BLD-MCNC: 7.5 G/DL — LOW (ref 11.5–15.5)
IMM GRANULOCYTES NFR BLD AUTO: 0.9 % — SIGNIFICANT CHANGE UP (ref 0–0.9)
INR BLD: 2.14 RATIO — HIGH (ref 0.85–1.16)
KETONES UR-MCNC: ABNORMAL MG/DL
LACTATE SERPL-SCNC: 0.9 MMOL/L — SIGNIFICANT CHANGE UP (ref 0.7–2)
LEUKOCYTE ESTERASE UR-ACNC: ABNORMAL
LYMPHOCYTES # BLD AUTO: 0.7 K/UL — LOW (ref 1–3.3)
LYMPHOCYTES # BLD AUTO: 7.8 % — LOW (ref 13–44)
MANUAL SMEAR VERIFICATION: SIGNIFICANT CHANGE UP
MCHC RBC-ENTMCNC: 23.9 PG — LOW (ref 27–34)
MCHC RBC-ENTMCNC: 30.4 G/DL — LOW (ref 32–36)
MCV RBC AUTO: 78.7 FL — LOW (ref 80–100)
MICROCYTES BLD QL: SLIGHT — SIGNIFICANT CHANGE UP
MONOCYTES # BLD AUTO: 0.25 K/UL — SIGNIFICANT CHANGE UP (ref 0–0.9)
MONOCYTES NFR BLD AUTO: 2.8 % — SIGNIFICANT CHANGE UP (ref 2–14)
NEUTROPHILS # BLD AUTO: 7.95 K/UL — HIGH (ref 1.8–7.4)
NEUTROPHILS NFR BLD AUTO: 88.1 % — HIGH (ref 43–77)
NITRITE UR-MCNC: NEGATIVE — SIGNIFICANT CHANGE UP
NRBC # BLD: 0 /100 WBCS — SIGNIFICANT CHANGE UP (ref 0–0)
NT-PROBNP SERPL-SCNC: 121 PG/ML — SIGNIFICANT CHANGE UP (ref 0–125)
PH UR: 5.5 — SIGNIFICANT CHANGE UP (ref 5–8)
PLAT MORPH BLD: NORMAL — SIGNIFICANT CHANGE UP
PLATELET # BLD AUTO: 191 K/UL — SIGNIFICANT CHANGE UP (ref 150–400)
PLATELET COUNT - ESTIMATE: NORMAL — SIGNIFICANT CHANGE UP
POLYCHROMASIA BLD QL SMEAR: SLIGHT — SIGNIFICANT CHANGE UP
POTASSIUM SERPL-MCNC: 3.9 MMOL/L — SIGNIFICANT CHANGE UP (ref 3.5–5.3)
POTASSIUM SERPL-SCNC: 3.9 MMOL/L — SIGNIFICANT CHANGE UP (ref 3.5–5.3)
PROT SERPL-MCNC: 7.7 G/DL — SIGNIFICANT CHANGE UP (ref 6–8.3)
PROT UR-MCNC: 100 MG/DL
PROTHROM AB SERPL-ACNC: 24.9 SEC — HIGH (ref 9.9–13.4)
RBC # BLD: 3.14 M/UL — LOW (ref 3.8–5.2)
RBC # FLD: 22.8 % — HIGH (ref 10.3–14.5)
RBC BLD AUTO: ABNORMAL
RBC CASTS # UR COMP ASSIST: 0 /HPF — SIGNIFICANT CHANGE UP (ref 0–4)
RSV RNA NPH QL NAA+NON-PROBE: SIGNIFICANT CHANGE UP
SARS-COV-2 RNA SPEC QL NAA+PROBE: SIGNIFICANT CHANGE UP
SODIUM SERPL-SCNC: 133 MMOL/L — LOW (ref 135–145)
SP GR SPEC: 1.02 — SIGNIFICANT CHANGE UP (ref 1–1.03)
TROPONIN I, HIGH SENSITIVITY RESULT: 10.2 NG/L — SIGNIFICANT CHANGE UP
UROBILINOGEN FLD QL: 1 MG/DL — SIGNIFICANT CHANGE UP (ref 0.2–1)
WBC # BLD: 9.02 K/UL — SIGNIFICANT CHANGE UP (ref 3.8–10.5)
WBC # FLD AUTO: 9.02 K/UL — SIGNIFICANT CHANGE UP (ref 3.8–10.5)
WBC UR QL: 3 /HPF — SIGNIFICANT CHANGE UP (ref 0–5)

## 2024-12-31 PROCEDURE — 99223 1ST HOSP IP/OBS HIGH 75: CPT | Mod: 25

## 2024-12-31 PROCEDURE — 10061 I&D ABSCESS COMP/MULTIPLE: CPT

## 2024-12-31 PROCEDURE — 99285 EMERGENCY DEPT VISIT HI MDM: CPT

## 2024-12-31 PROCEDURE — 71275 CT ANGIOGRAPHY CHEST: CPT | Mod: 26,MC

## 2024-12-31 RX ORDER — VANCOMYCIN HYDROCHLORIDE 5 G/100ML
1000 INJECTION, POWDER, LYOPHILIZED, FOR SOLUTION INTRAVENOUS ONCE
Refills: 0 | Status: COMPLETED | OUTPATIENT
Start: 2024-12-31 | End: 2024-12-31

## 2024-12-31 RX ORDER — LORAZEPAM 1 MG/1
1 TABLET ORAL ONCE
Refills: 0 | Status: DISCONTINUED | OUTPATIENT
Start: 2024-12-31 | End: 2024-12-31

## 2024-12-31 RX ORDER — VITAMIN A 10000 UNIT
1 TABLET ORAL DAILY
Refills: 0 | Status: DISCONTINUED | OUTPATIENT
Start: 2024-12-31 | End: 2025-01-05

## 2024-12-31 RX ORDER — MAG HYDROX/ALUMINUM HYD/SIMETH 200-200-20
30 SUSPENSION, ORAL (FINAL DOSE FORM) ORAL EVERY 4 HOURS
Refills: 0 | Status: DISCONTINUED | OUTPATIENT
Start: 2024-12-31 | End: 2025-01-05

## 2024-12-31 RX ORDER — ACETAMINOPHEN 80 MG/.8ML
650 SOLUTION/ DROPS ORAL EVERY 6 HOURS
Refills: 0 | Status: DISCONTINUED | OUTPATIENT
Start: 2024-12-31 | End: 2025-01-05

## 2024-12-31 RX ORDER — APIXABAN 5 MG/1
1 TABLET, FILM COATED ORAL
Refills: 0 | DISCHARGE

## 2024-12-31 RX ORDER — GINKGO BILOBA 40 MG
3 CAPSULE ORAL AT BEDTIME
Refills: 0 | Status: DISCONTINUED | OUTPATIENT
Start: 2024-12-31 | End: 2025-01-05

## 2024-12-31 RX ORDER — APIXABAN 5 MG/1
5 TABLET, FILM COATED ORAL EVERY 12 HOURS
Refills: 0 | Status: DISCONTINUED | OUTPATIENT
Start: 2024-12-31 | End: 2025-01-05

## 2024-12-31 RX ORDER — ACETAMINOPHEN 80 MG/.8ML
650 SOLUTION/ DROPS ORAL ONCE
Refills: 0 | Status: COMPLETED | OUTPATIENT
Start: 2024-12-31 | End: 2024-12-31

## 2024-12-31 RX ORDER — SODIUM CHLORIDE 9 MG/ML
1000 INJECTION, SOLUTION INTRAMUSCULAR; INTRAVENOUS; SUBCUTANEOUS ONCE
Refills: 0 | Status: COMPLETED | OUTPATIENT
Start: 2024-12-31 | End: 2024-12-31

## 2024-12-31 RX ORDER — ONDANSETRON 4 MG/1
4 TABLET ORAL EVERY 8 HOURS
Refills: 0 | Status: DISCONTINUED | OUTPATIENT
Start: 2024-12-31 | End: 2025-01-05

## 2024-12-31 RX ADMIN — Medication 100 MILLIGRAM(S): at 11:09

## 2024-12-31 RX ADMIN — VANCOMYCIN HYDROCHLORIDE 250 MILLIGRAM(S): 5 INJECTION, POWDER, LYOPHILIZED, FOR SOLUTION INTRAVENOUS at 10:05

## 2024-12-31 RX ADMIN — LORAZEPAM 1 MILLIGRAM(S): 1 TABLET ORAL at 05:02

## 2024-12-31 RX ADMIN — LORAZEPAM 1 MILLIGRAM(S): 1 TABLET ORAL at 03:43

## 2024-12-31 RX ADMIN — SODIUM CHLORIDE 1000 MILLILITER(S): 9 INJECTION, SOLUTION INTRAMUSCULAR; INTRAVENOUS; SUBCUTANEOUS at 10:05

## 2024-12-31 RX ADMIN — ACETAMINOPHEN 650 MILLIGRAM(S): 80 SOLUTION/ DROPS ORAL at 06:56

## 2024-12-31 RX ADMIN — ACETAMINOPHEN 650 MILLIGRAM(S): 80 SOLUTION/ DROPS ORAL at 06:26

## 2024-12-31 RX ADMIN — APIXABAN 5 MILLIGRAM(S): 5 TABLET, FILM COATED ORAL at 19:02

## 2024-12-31 NOTE — ED ADULT NURSE NOTE - OBJECTIVE STATEMENT
Patient is a 38y/o female presenting to the ED c/o she has shortness of breath today.  Patient had heart surgery 4 days ago.  Patient is on Eliquis.

## 2024-12-31 NOTE — PATIENT PROFILE ADULT - FALL HARM RISK - HARM RISK INTERVENTIONS

## 2024-12-31 NOTE — ED PROVIDER NOTE - OBJECTIVE STATEMENT
39 year old female with  newly diagnosed poorly differentiated breast ca, recently started neoadjuvant chemotherapy at [facility], found to have  bilateral PE with possible clot in right atrium, transferred to [facility] for PERT evaluation 12/13. Patient states that she was discharged, with resolution symptoms.  However, shortness of breath and chest pain started again today.  She also had 2 episodes of vomiting and endorses current nausea.  Patient was discharged on Eliquis, to which she endorses compliance with.  Denies any fever or chills.

## 2024-12-31 NOTE — ED PROVIDER NOTE - CLINICAL SUMMARY MEDICAL DECISION MAKING FREE TEXT BOX
39 year old female with  newly diagnosed poorly differentiated breast ca, recently started neoadjuvant chemotherapy at [facility], found to have  bilateral PE with possible clot in right atrium, transferred to [facility] for PERT evaluation 12/13. Patient states that she was discharged, with resolution symptoms.  However, shortness of breath and chest pain started again today.  She also had 2 episodes of vomiting and endorses current nausea.  Patient was discharged on Eliquis, to which she endorses compliance with.  Denies any fever or chills.   Tachycardic 127, satting 100% room air.  Given history of bilateral PEs and clot in right atrium, concern for recurrent PE.  Will obtain CTA to evaluate.  Will obtain labs, CTA, and reassess.  Patient extremely anxious upon examination, moaning in pain.  Will give Ativan prior to CT imaging.

## 2024-12-31 NOTE — CONSULT NOTE ADULT - SUBJECTIVE AND OBJECTIVE BOX
HPI:  Patient is a 39yF, recently diagnosed with Left breast CA in 2024 on chemo. Patient was previously at [facility] in early december for SOB and chest pain found to have bilateral pulm emboli during the stay patient was offered thrombectomy however patient declined and later changed her mind during the stay however providers recommended the patient continue her medical management Eliquis and Rivaroxaban). Patient follows Dr. Damien STALLINGS and received her first session of chemotherapy on  as per the patient the infusion lasted 6hours and since then she has been having fatigue, sob on exertion, chronic productive cough, nausea and vomiting since the chemotherapy. Patient reports subjective fevers however did not measure the temperature at home. She denies palpitations, chest pain, lightheadedness, syncopal episodes, hemoptysis, hematemesis, hematochezia, melena.  On further questioning patient disclosed she has a biopsy done on her left breast in mid november. The site now appears to have an ulcer. She notes she has chronic discharge serosanguinous occasionally bloody, malodorous.    (31 Dec 2024 12:50)    Breast surgery HPI  39 year old female with PMhx of L breast Ca in 2024, presents to the ED for L breast swelling and ulceration. Patient was recently diagnose with breast ca in 2024 and had a biopsy done in which she says her symptoms started. Pt reports that was previously breastfeeding her son from earlier this year to around summer this year, used a breast pump on high suction around the time which caused discomfort. Pt is a poor historian and unable to give a clear timeline on events leading up to her L breast swelling. Patient has been to 5 different EDs in which they told her it was mastitis and to take PO meds in which she doesn't think helped. She says she went to chemotherapy last on . Been feeling feverish at home. She reports that she feels like a duct came out and looked very weird to here.     Of note, had PE since admission at [facility] in 2024 and at the time declined thrombectomy and only for AC (eliquis).          PAST MEDICAL & SURGICAL HISTORY:  Breast cancer      Pulmonary embolism      No significant past surgical history          MEDICATIONS  (STANDING):  apixaban 5 milliGRAM(s) Oral every 12 hours  folic acid 1 milliGRAM(s) Oral daily    MEDICATIONS  (PRN):  acetaminophen     Tablet .. 650 milliGRAM(s) Oral every 6 hours PRN Temp greater or equal to 38C (100.4F), Mild Pain (1 - 3)  aluminum hydroxide/magnesium hydroxide/simethicone Suspension 30 milliLiter(s) Oral every 4 hours PRN Dyspepsia  melatonin 3 milliGRAM(s) Oral at bedtime PRN Insomnia  ondansetron Injectable 4 milliGRAM(s) IV Push every 8 hours PRN Nausea and/or Vomiting      Allergies    No Known Allergies    Intolerances        ROS: Negative except per HPI.     SOCIAL HISTORY:  Smoking history   ETOH history    OBJECTIVE:    Vital Signs Last 24 Hrs  T(C): 37.1 (31 Dec 2024 16:21), Max: 38.7 (31 Dec 2024 16:10)  T(F): 98.8 (31 Dec 2024 16:21), Max: 101.7 (31 Dec 2024 16:10)  HR: 118 (31 Dec 2024 16:21) (99 - 127)  BP: 105/50 (31 Dec 2024 16:10) (99/71 - 121/75)  BP(mean): 76 (31 Dec 2024 05:04) (76 - 76)  RR: 18 (31 Dec 2024 16:10) (18 - 22)  SpO2: 98% (31 Dec 2024 16:10) (96% - 100%)    Parameters below as of 31 Dec 2024 16:10  Patient On (Oxygen Delivery Method): room air        I&O's Summary      LABS:                        7.5    9.02  )-----------( 191      ( 31 Dec 2024 03:59 )             24.7     12-    133[L]  |  100  |  26[H]  ----------------------------<  142[H]  3.9   |  21[L]  |  1.17    Ca    9.0      31 Dec 2024 03:59    TPro  7.7  /  Alb  2.1[L]  /  TBili  1.2  /  DBili  x   /  AST  21  /  ALT  23  /  AlkPhos  99  12-31    PT/INR - ( 31 Dec 2024 03:59 )   PT: 24.9 sec;   INR: 2.14 ratio         PTT - ( 31 Dec 2024 03:59 )  PTT:42.2 sec  Urinalysis Basic - ( 31 Dec 2024 06:39 )    Color: Dark Yellow / Appearance: Cloudy / S.022 / pH: x  Gluc: x / Ketone: Trace mg/dL  / Bili: Small / Urobili: 1.0 mg/dL   Blood: x / Protein: 100 mg/dL / Nitrite: Negative   Leuk Esterase: Trace / RBC: 0 /HPF / WBC 3 /HPF   Sq Epi: x / Non Sq Epi: x / Bacteria: Many /HPF      CAPILLARY BLOOD GLUCOSE        LIVER FUNCTIONS - ( 31 Dec 2024 03:59 )  Alb: 2.1 g/dL / Pro: 7.7 g/dL / ALK PHOS: 99 U/L / ALT: 23 U/L DA / AST: 21 U/L / GGT: x             Cultures:      PHYSICAL EXAM:   General: AOx3, NAD.   Cardio: RR  Pulm: Normal chest rise and expansion. Non-labored breathing.  Breast: L breast size of medium watermelon, with tenderness at the lateral 5pm area. Single ulceration noted at the 5pm area that is actively drainage pustular material, nonmalodorous. Very inflammed and warm breast to the touch.               HPI:  Patient is a 39yF, recently diagnosed with Left breast CA in 2024 on chemo. Patient was previously at [facility] in early december for SOB and chest pain found to have bilateral pulm emboli during the stay patient was offered thrombectomy however patient declined and later changed her mind during the stay however providers recommended the patient continue her medical management Eliquis and Rivaroxaban). Patient follows Dr. Damien STALLINGS and received her first session of chemotherapy on  as per the patient the infusion lasted 6hours and since then she has been having fatigue, sob on exertion, chronic productive cough, nausea and vomiting since the chemotherapy. Patient reports subjective fevers however did not measure the temperature at home. She denies palpitations, chest pain, lightheadedness, syncopal episodes, hemoptysis, hematemesis, hematochezia, melena.  On further questioning patient disclosed she has a biopsy done on her left breast in mid november. The site now appears to have an ulcer. She notes she has chronic discharge serosanguinous occasionally bloody, malodorous.    (31 Dec 2024 12:50)    Breast surgery HPI  39 year old female with PMhx of L breast Ca in 2024, presents to the ED for L breast swelling and ulceration. Patient was recently diagnose with breast ca in 2024 and had a biopsy done in which she says her symptoms started. Pt reports that was previously breastfeeding her son from earlier this year to around summer this year, used a breast pump on high suction around the time which caused discomfort. Pt is a poor historian and unable to give a clear timeline on events leading up to her L breast swelling. Patient has been to 5 different EDs in which they told her it was mastitis and to take PO meds in which she doesn't think helped. She says she went to chemotherapy last on . Been feeling feverish at home. She reports that she feels like a duct came out and looked very weird to here.     Of note, had PE since admission at [facility] in 2024 and at the time declined thrombectomy and only for AC (eliquis).          PAST MEDICAL & SURGICAL HISTORY:  Breast cancer      Pulmonary embolism      No significant past surgical history          MEDICATIONS  (STANDING):  apixaban 5 milliGRAM(s) Oral every 12 hours  folic acid 1 milliGRAM(s) Oral daily    MEDICATIONS  (PRN):  acetaminophen     Tablet .. 650 milliGRAM(s) Oral every 6 hours PRN Temp greater or equal to 38C (100.4F), Mild Pain (1 - 3)  aluminum hydroxide/magnesium hydroxide/simethicone Suspension 30 milliLiter(s) Oral every 4 hours PRN Dyspepsia  melatonin 3 milliGRAM(s) Oral at bedtime PRN Insomnia  ondansetron Injectable 4 milliGRAM(s) IV Push every 8 hours PRN Nausea and/or Vomiting      Allergies    No Known Allergies    Intolerances        ROS: Negative except per HPI.     SOCIAL HISTORY:  Smoking history   ETOH history    OBJECTIVE:    Vital Signs Last 24 Hrs  T(C): 37.1 (31 Dec 2024 16:21), Max: 38.7 (31 Dec 2024 16:10)  T(F): 98.8 (31 Dec 2024 16:21), Max: 101.7 (31 Dec 2024 16:10)  HR: 118 (31 Dec 2024 16:21) (99 - 127)  BP: 105/50 (31 Dec 2024 16:10) (99/71 - 121/75)  BP(mean): 76 (31 Dec 2024 05:04) (76 - 76)  RR: 18 (31 Dec 2024 16:10) (18 - 22)  SpO2: 98% (31 Dec 2024 16:10) (96% - 100%)    Parameters below as of 31 Dec 2024 16:10  Patient On (Oxygen Delivery Method): room air        I&O's Summary      LABS:                        7.5    9.02  )-----------( 191      ( 31 Dec 2024 03:59 )             24.7     12-    133[L]  |  100  |  26[H]  ----------------------------<  142[H]  3.9   |  21[L]  |  1.17    Ca    9.0      31 Dec 2024 03:59    TPro  7.7  /  Alb  2.1[L]  /  TBili  1.2  /  DBili  x   /  AST  21  /  ALT  23  /  AlkPhos  99  12-31    PT/INR - ( 31 Dec 2024 03:59 )   PT: 24.9 sec;   INR: 2.14 ratio         PTT - ( 31 Dec 2024 03:59 )  PTT:42.2 sec  Urinalysis Basic - ( 31 Dec 2024 06:39 )    Color: Dark Yellow / Appearance: Cloudy / S.022 / pH: x  Gluc: x / Ketone: Trace mg/dL  / Bili: Small / Urobili: 1.0 mg/dL   Blood: x / Protein: 100 mg/dL / Nitrite: Negative   Leuk Esterase: Trace / RBC: 0 /HPF / WBC 3 /HPF   Sq Epi: x / Non Sq Epi: x / Bacteria: Many /HPF      CAPILLARY BLOOD GLUCOSE        LIVER FUNCTIONS - ( 31 Dec 2024 03:59 )  Alb: 2.1 g/dL / Pro: 7.7 g/dL / ALK PHOS: 99 U/L / ALT: 23 U/L DA / AST: 21 U/L / GGT: x             Cultures:      PHYSICAL EXAM:   General: AOx3, NAD.   Cardio: RR  Pulm: Normal chest rise and expansion. Non-labored breathing.  Breast: Enlarged and inflamed left breast with thickened skin. 5-6:00 large necrotic ulcer draining seropurulent malodorous fluid, 3:00 fluctuant area measuring 2-3 cm with necrotic center,

## 2024-12-31 NOTE — H&P ADULT - PROBLEM SELECTOR PLAN 1
p/w fever, HR >90, tachypneic, lactate wnl  s/p biopsy of left breast in mid november 2024, since there is chronic drainage and open wound.   serosanguinous, malodorous ulcer   received 1L bolus  f/u blood cultures, urine cultures  ua negative  flu/covid: negative   tylenol prn  surgery consulted   ID Dr. Lizarraga consulted p/w fever, HR >90, tachypneic, lactate wnl  s/p biopsy of left breast in mid november 2024, since there is chronic drainage and open wound.   serosanguinous, malodorous ulcer   received 1L bolus  f/u blood cultures, urine cultures  ua negative  flu/covid: negative   tylenol prn  surgery consulted to evaluate ulcer  ID Dr. Lizarraga consulted

## 2024-12-31 NOTE — H&P ADULT - PROBLEM SELECTOR PLAN 2
ulcer present on left lateral lower quadrant of breast since mid november post biopsy  since then ulcer present and chronically draining: malodorous serosangionous bloody discharge daily  Left breast enlarged and appears to have a peu d'orange appearance compared to right breast  surgery consulted for possible intervention

## 2024-12-31 NOTE — ED PROVIDER NOTE - PHYSICAL EXAMINATION
Gen: anxious, moaning  Resp: CTAB, no W/R/R  CV: RRR, +S1/S2, no M/R/G  GI: Abdomen soft non-distended, NTTP, no masses  Ext: no edema, no deformity, warm and well-perfused  Skin: no rash or bruising

## 2024-12-31 NOTE — ED ADULT NURSE NOTE - NSFALLUNIVINTERV_ED_ALL_ED
Bed/Stretcher in lowest position, wheels locked, appropriate side rails in place/Call bell, personal items and telephone in reach/Instruct patient to call for assistance before getting out of bed/chair/stretcher/Non-slip footwear applied when patient is off stretcher/Topmost to call system/Physically safe environment - no spills, clutter or unnecessary equipment/Purposeful proactive rounding/Room/bathroom lighting operational, light cord in reach

## 2024-12-31 NOTE — H&P ADULT - PROBLEM SELECTOR PLAN 3
recently diagnosed with left  breast cancer in Nov 2024.   underwent 1 session of chemotherapy on Dec 5th as per patient it was 6 hour infusion after which she developed lethargy, nausea and vomiting  follows Dr. Quezada at [facility]  Consulted Dr. Aragon

## 2024-12-31 NOTE — PROCEDURE NOTE - ADDITIONAL PROCEDURE DETAILS
- Left breast 5-6:00 actively draining ulcer opening was bluntly extended allowing for drainage of copious seropurulent fluid with some necrotic substance also expressed, kerlix used for packing  - Left breast 3:00 2-3 cm fluctuant lesion with eschar, I&D performed with #15 blade and minimal drainage noted

## 2024-12-31 NOTE — H&P ADULT - ASSESSMENT
Patient is a 39yF, recently diagnosed with Left breast CA in nov 2024 on chemo. Recently in [facility] for bilateral PE refused thrombectomy initially, later agreeable however providers recommended medical management with eliquis. Patient presented to the ED with continued cough and sob on exertion which started when the PE was discovered. In the ED febrile Tmax 101, tachycardic. On exam patient has a left breast ulcer which has been present since the biopsy was performed in november. Patient is being admitted for Sepsis 2/2 Left breast ulcer and management of PE.

## 2024-12-31 NOTE — CONSULT NOTE ADULT - ASSESSMENT
39 year old female with recent L breast ca s/p bx in 11/2024 with L breast swelling and draining ulceration likely 2/2 inflammatory breast cancer.  tmax 101, tachy 120s, no white count    pt is a poor historian, unable to clarify timeline of events    - no acute surgical intervention required at this time  - hot compresses at L breast  - AC per primary team  - f/u heme/onc recs  - remainder of care per primary team  - discussed with Dr. Berman 39F with pmh left inflammatory breast cancer s/p biopsy in November 2024, s/p first round of chemotherapy in December complicated by PE on AC, now with fevers and tachycardia.     - Opening to inferior ulcer was extended at bedside and ~50cc of seropurulent drainage evacuated and sent for culture, 3:00 necrotic lesion incised with minimal drainage  - Antibiotics per ID  - Pain control  - Will follow up Medical Oncology treatment recommendations  - Care per primary team       39F with pmh left inflammatory breast cancer s/p biopsy in November 2024, s/p first round of chemotherapy in December complicated by PE on AC, now with fevers and tachycardia.     - Opening to inferior ulcer was extended at bedside and ~50cc of seropurulent drainage evacuated and sent for culture, 3:00 necrotic lesion incised with minimal drainage  - Follow up cultures*  - Wound packing to be changed daily*  - Antibiotics per ID  - Pain control  - Will follow up Medical Oncology treatment recommendations  - Care per primary team      Jonn Berman MD  Attending Breast Surgeon

## 2024-12-31 NOTE — H&P ADULT - HISTORY OF PRESENT ILLNESS
Patient is a 39yF, recently diagnosed with Left breast CA in nov 2024 on chemo. Patient  Patient is a 39yF, recently diagnosed with Left breast CA in nov 2024 on chemo. Patient was previously at [facility] in early december for SOB and chest pain found to have bilateral pulm emboli during the stay patient was offered thrombectomy however patient declined and later changed her mind during the stay however providers recommended the patient continue her medical management Eliquis and Rivaroxaban). Patient follows Dr. Damien STALLINGS and received her first session of chemotherapy on December 5th as per the patient the infusion lasted 6hours and since then she has been having fatigue, sob on exertion, chronic productive cough, nausea and vomiting since the chemotherapy. Patient reports subjective fevers however did not measure the temperature at home. She denies palpitations, chest pain, lightheadedness, syncopal episodes, hemoptysis, hematemesis, hematochezia, melena.  On further questioning patient disclosed she has a biopsy done on her left breast in mid november. The site now appears to have an ulcer. She notes she has chronic discharge serosanguinous occasionally bloody, malodorous.

## 2024-12-31 NOTE — ED ADULT TRIAGE NOTE - CHIEF COMPLAINT QUOTE
Insomnia, unspecified type  -     zolpidem (AMBIEN) 10 MG Tab; Take 1 Tab by mouth at bedtime as needed for Sleep for up to 30 days. (June 6, 2019 to July 6, 2019)  -     zolpidem (AMBIEN) 10 MG Tab; Take 1 Tab by mouth at bedtime as needed for Sleep for up to 30 days.(July 6, 2019 to August 5, 2019)    A Controlled Substance Agreement has been signed within the last year. A urine drug screen has been done in the past year.        The patient reports that insomnia is well controlled with the current treatment plan  They were counseled on other means to help with sleep   This patient is continuing to use a controlled substance (CS) on a long term basis.  The patient is thoroughly aware of the goals of treatment with the CS  The patient is aware that yearly and random urine drug screens are required.  The patient has been instructed to take the CS only as prescribed.  The patient is prohibited from sharing the CS with any other person.  The patient is instructed to inform the provider if any other CS is taken, of any alcohol or cannabis or other recreational drug use, any treatment for side effects of the CS or complications, if they have CS active rx in other states  The patient has evidence for a reason for the CS  The treatment plan has been discussed with the patient  The  report has been reviewed           she has shortness of breath today.  Patient had heart surgery 4 days ago she has shortness of breath today.  Patient had heart surgery 4 days ago.  Patient is on Eliquis.

## 2024-12-31 NOTE — H&P ADULT - PROBLEM SELECTOR PLAN 4
At [facility] diagnosed with bilateral PEs, patient refused thrombectomy initially hence started on medical management for PE.  p/w progressive productive cough and sob   on admission tachypneic to 22 in ED recieive  CTa Chest: stable PE.    At [facility] diagnosed with bilateral PEs, patient refused thrombectomy initially hence started on medical management for PE.  p/w progressive productive cough and sob   on admission tachypneic to 22 in ED recieive  CTa Chest: stable PE.   Dr. Aragon consulted   c/w tiffanie At [facility] diagnosed with bilateral PEs, patient refused thrombectomy initially hence started on medical management for PE.  p/w progressive productive cough and sob   on admission tachypneic to 22 in ED recieive  CTa Chest: stable PE.   c/w eliquis  Vascular consulted  Dr. Orta (card) consulted At [facility] diagnosed with bilateral PEs, patient refused thrombectomy initially hence started on medical management for PE.  p/w progressive productive cough and sob   on admission tachypneic to 22 in ED recieive  CTa Chest: stable PE.   c/w tiffanie Orta (card) consulted

## 2024-12-31 NOTE — H&P ADULT - NSHPREVIEWOFSYSTEMS_GEN_ALL_CORE
T(C): 37.1 (12-31-24 @ 16:21), Max: 38.7 (12-31-24 @ 16:10)  HR: 118 (12-31-24 @ 16:21) (99 - 127)  BP: 105/50 (12-31-24 @ 16:10) (99/71 - 121/75)  RR: 18 (12-31-24 @ 16:10) (18 - 22)  SpO2: 98% (12-31-24 @ 16:10) (96% - 100%)    REVIEW OF SYSTEMS:  CONSTITUTIONAL: + weakness, + fevers no chills  EYES/ENT: No visual changes;  No vertigo or throat pain   NECK: No pain or stiffness  RESPIRATORY: + cough,no wheezing,no hemoptysis; + shortness of breath on exertion  CARDIOVASCULAR: + chest tightness no palpitations  GASTROINTESTINAL: No abdominal or epigastric pain. + nausea, + vomiting, no hematemesis; No diarrhea or constipation. No melena or hematochezia.  GENITOURINARY: No dysuria, frequency or hematuria  NEUROLOGICAL: No numbness or weakness  SKIN: No itching, rashes

## 2024-12-31 NOTE — H&P ADULT - ATTENDING COMMENTS
pt was evaluated by writer earlier during the day  discussed management plan with house staff  cards/ vascular eval

## 2024-12-31 NOTE — ED ADULT NURSE REASSESSMENT NOTE - NS ED NURSE REASSESS COMMENT FT1
RG to Dr. Miles Parnell contacted to discontinue cardiac monitor order, patient is a medicine patient

## 2024-12-31 NOTE — H&P ADULT - SOCIAL HISTORY
Airway patent, Nasal mucosa clear. Mouth with normal mucosa. Throat has no vesicles, no oropharyngeal exudates and uvula is midline.
No
100

## 2024-12-31 NOTE — ED PROVIDER NOTE - HOW PATIENT ADDRESSED, PROFILE
Dr. Barnett [place] 90-day supply. Please review and sign pended orders if appropriate. LOV: 10/20/2022  LR: 12/7/2022   Future Appts: Colonoscopy 4/6/2023      Disp Refills Start End    Adalimumab (HUMIRA PEN) 40 MG/0.8ML Subcutaneous Pen-injector Kit 6 each 3 12/7/2022     Sig - Route: Inject 1 pen. into the skin every 14 (fourteen) days. - Subcutaneous    Sent to pharmacy as: Humira Pen 40 MG/0.8ML Subcutaneous Pen-injector Kit (Adalimumab)        Thank you! Linda

## 2024-12-31 NOTE — ED PROVIDER NOTE - PROGRESS NOTE DETAILS
Shila: CTA with stable pulmonary embolism.  Right sided pulmonary infarct appreciated at this time given fevers and pulmonary infarct will admit.  Antibiotics ordered

## 2024-12-31 NOTE — H&P ADULT - NSHPPHYSICALEXAM_GEN_ALL_CORE
PHYSICAL EXAM:  GENERAL: NAD, speaks in full sentences, no signs of respiratory distress  HEAD:  Atraumatic, Normocephalic  EYES: EOMI, conjunctiva and sclera clear  NECK: Supple, No JVD  CHEST/LUNG: Clear to auscultation bilaterally; No wheeze; No crackles; No accessory muscles used.  BREAST: left breast outer lower quadrant 1.5 x 1.5cm ulcer present with continuous serosanguinous discharge malodorous. left breast larger than right breast. Left breast appears to have peau d'orange appearance.  HEART: Regular rate and rhythm; No murmurs;   ABDOMEN: Soft, Nontender, Nondistended; Bowel sounds present; No guarding  EXTREMITIES:  2+ Peripheral Pulses, No cyanosis or edema  PSYCH: AAOx3. emotionally labile  NEUROLOGY: non-focal  SKIN: bruising on left groin region patient mentions its from the procedure to removal the atrial clot.

## 2025-01-01 LAB
ANION GAP SERPL CALC-SCNC: 10 MMOL/L — SIGNIFICANT CHANGE UP (ref 5–17)
BUN SERPL-MCNC: 15 MG/DL — SIGNIFICANT CHANGE UP (ref 7–18)
CALCIUM SERPL-MCNC: 8.8 MG/DL — SIGNIFICANT CHANGE UP (ref 8.4–10.5)
CHLORIDE SERPL-SCNC: 103 MMOL/L — SIGNIFICANT CHANGE UP (ref 96–108)
CO2 SERPL-SCNC: 22 MMOL/L — SIGNIFICANT CHANGE UP (ref 22–31)
CREAT SERPL-MCNC: 0.85 MG/DL — SIGNIFICANT CHANGE UP (ref 0.5–1.3)
EGFR: 89 ML/MIN/1.73M2 — SIGNIFICANT CHANGE UP
GLUCOSE SERPL-MCNC: 132 MG/DL — HIGH (ref 70–99)
GRAM STN FLD: ABNORMAL
HCT VFR BLD CALC: 23.9 % — LOW (ref 34.5–45)
HGB BLD-MCNC: 7.5 G/DL — LOW (ref 11.5–15.5)
MAGNESIUM SERPL-MCNC: 2.2 MG/DL — SIGNIFICANT CHANGE UP (ref 1.6–2.6)
MCHC RBC-ENTMCNC: 24.2 PG — LOW (ref 27–34)
MCHC RBC-ENTMCNC: 31.4 G/DL — LOW (ref 32–36)
MCV RBC AUTO: 77.1 FL — LOW (ref 80–100)
NRBC # BLD: 0 /100 WBCS — SIGNIFICANT CHANGE UP (ref 0–0)
PHOSPHATE SERPL-MCNC: 1.9 MG/DL — LOW (ref 2.5–4.5)
PLATELET # BLD AUTO: 180 K/UL — SIGNIFICANT CHANGE UP (ref 150–400)
POTASSIUM SERPL-MCNC: 3.5 MMOL/L — SIGNIFICANT CHANGE UP (ref 3.5–5.3)
POTASSIUM SERPL-SCNC: 3.5 MMOL/L — SIGNIFICANT CHANGE UP (ref 3.5–5.3)
RBC # BLD: 3.1 M/UL — LOW (ref 3.8–5.2)
RBC # FLD: 23 % — HIGH (ref 10.3–14.5)
SODIUM SERPL-SCNC: 135 MMOL/L — SIGNIFICANT CHANGE UP (ref 135–145)
SPECIMEN SOURCE: SIGNIFICANT CHANGE UP
WBC # BLD: 8.49 K/UL — SIGNIFICANT CHANGE UP (ref 3.8–10.5)
WBC # FLD AUTO: 8.49 K/UL — SIGNIFICANT CHANGE UP (ref 3.8–10.5)

## 2025-01-01 RX ORDER — PIPERACILLIN AND TAZOBACTAM 3; .375 G/15ML; G/15ML
3.38 INJECTION, POWDER, LYOPHILIZED, FOR SOLUTION INTRAVENOUS ONCE
Refills: 0 | Status: COMPLETED | OUTPATIENT
Start: 2025-01-01 | End: 2025-01-01

## 2025-01-01 RX ORDER — GUAIFENESIN 100 MG/5ML
200 SYRUP ORAL EVERY 6 HOURS
Refills: 0 | Status: COMPLETED | OUTPATIENT
Start: 2025-01-01 | End: 2025-01-04

## 2025-01-01 RX ORDER — PIPERACILLIN AND TAZOBACTAM 3; .375 G/15ML; G/15ML
3.38 INJECTION, POWDER, LYOPHILIZED, FOR SOLUTION INTRAVENOUS EVERY 8 HOURS
Refills: 0 | Status: DISCONTINUED | OUTPATIENT
Start: 2025-01-02 | End: 2025-01-05

## 2025-01-01 RX ORDER — POTASSIUM PHOSPHATE, MONOBASIC AND POTASSIUM PHOSPHATE, DIBASIC 224; 236 MG/ML; MG/ML
30 INJECTION, SOLUTION INTRAVENOUS ONCE
Refills: 0 | Status: COMPLETED | OUTPATIENT
Start: 2025-01-01 | End: 2025-01-01

## 2025-01-01 RX ORDER — PIPERACILLIN AND TAZOBACTAM 3; .375 G/15ML; G/15ML
3.38 INJECTION, POWDER, LYOPHILIZED, FOR SOLUTION INTRAVENOUS ONCE
Refills: 0 | Status: COMPLETED | OUTPATIENT
Start: 2025-01-01 | End: 2025-01-02

## 2025-01-01 RX ADMIN — Medication 200 MILLIGRAM(S): at 18:15

## 2025-01-01 RX ADMIN — POTASSIUM PHOSPHATE, MONOBASIC AND POTASSIUM PHOSPHATE, DIBASIC 83.33 MILLIMOLE(S): 224; 236 INJECTION, SOLUTION INTRAVENOUS at 11:04

## 2025-01-01 RX ADMIN — PIPERACILLIN AND TAZOBACTAM 200 GRAM(S): 3; .375 INJECTION, POWDER, LYOPHILIZED, FOR SOLUTION INTRAVENOUS at 18:15

## 2025-01-01 RX ADMIN — Medication 200 MILLIGRAM(S): at 11:04

## 2025-01-01 RX ADMIN — APIXABAN 5 MILLIGRAM(S): 5 TABLET, FILM COATED ORAL at 18:15

## 2025-01-01 RX ADMIN — Medication 1 MILLIGRAM(S): at 11:04

## 2025-01-01 RX ADMIN — APIXABAN 5 MILLIGRAM(S): 5 TABLET, FILM COATED ORAL at 06:25

## 2025-01-01 NOTE — PROGRESS NOTE ADULT - ASSESSMENT
39y.o. Female s/p bedside I&D L breast    -con't daily packing of inferior left breast wound  -con't dry dressing over lateral left breast wound  -con't abx  -f/u wound cx

## 2025-01-01 NOTE — PROGRESS NOTE ADULT - SUBJECTIVE AND OBJECTIVE BOX
INTERVAL HPI/OVERNIGHT EVENTS:  Pt resting comfortably. No acute complaints.     MEDICATIONS  (STANDING):  apixaban 5 milliGRAM(s) Oral every 12 hours  folic acid 1 milliGRAM(s) Oral daily  guaiFENesin Oral Liquid (Sugar-Free) 200 milliGRAM(s) Oral every 6 hours    MEDICATIONS  (PRN):  acetaminophen     Tablet .. 650 milliGRAM(s) Oral every 6 hours PRN Temp greater or equal to 38C (100.4F), Mild Pain (1 - 3)  aluminum hydroxide/magnesium hydroxide/simethicone Suspension 30 milliLiter(s) Oral every 4 hours PRN Dyspepsia  melatonin 3 milliGRAM(s) Oral at bedtime PRN Insomnia  ondansetron Injectable 4 milliGRAM(s) IV Push every 8 hours PRN Nausea and/or Vomiting    Vital Signs Last 24 Hrs  T(C): 36.7 (01 Jan 2025 11:20), Max: 38.7 (31 Dec 2024 16:10)  T(F): 98.1 (01 Jan 2025 11:20), Max: 101.7 (31 Dec 2024 16:10)  HR: 103 (01 Jan 2025 11:20) (99 - 126)  BP: 104/64 (01 Jan 2025 11:20) (92/52 - 121/75)  BP(mean): --  RR: 18 (01 Jan 2025 11:20) (18 - 18)  SpO2: 97% (01 Jan 2025 11:20) (94% - 98%)    Parameters below as of 01 Jan 2025 11:20  Patient On (Oxygen Delivery Method): room air    Physical:  General: A&Ox3. NAD.  Chest: Pendulous breasts. Inferior L breast wound dry. No active drainage noted. Cavity without tenderness or discharge.    I&O's Detail    31 Dec 2024 07:01  -  01 Jan 2025 07:00  --------------------------------------------------------  IN:  Total IN: 0 mL    OUT:    Voided (mL): 450 mL  Total OUT: 450 mL    Total NET: -450 mL    LABS:                        7.5    8.49  )-----------( 180      ( 01 Jan 2025 05:43 )             23.9             01-01    135  |  103  |  15  ----------------------------<  132[H]  3.5   |  22  |  0.85    Ca    8.8      01 Jan 2025 05:43  Phos  1.9     01-01  Mg     2.2     01-01    TPro  7.7  /  Alb  2.1[L]  /  TBili  1.2  /  DBili  x   /  AST  21  /  ALT  23  /  AlkPhos  99  12-31

## 2025-01-01 NOTE — PROGRESS NOTE ADULT - SUBJECTIVE AND OBJECTIVE BOX
SUBJECTIVE / OVERNIGHT EVENTS:pt seen and examined  01-01-25 @ 12:27    MEDICATIONS  (STANDING):  apixaban 5 milliGRAM(s) Oral every 12 hours  folic acid 1 milliGRAM(s) Oral daily  guaiFENesin Oral Liquid (Sugar-Free) 200 milliGRAM(s) Oral every 6 hours    MEDICATIONS  (PRN):  acetaminophen     Tablet .. 650 milliGRAM(s) Oral every 6 hours PRN Temp greater or equal to 38C (100.4F), Mild Pain (1 - 3)  aluminum hydroxide/magnesium hydroxide/simethicone Suspension 30 milliLiter(s) Oral every 4 hours PRN Dyspepsia  melatonin 3 milliGRAM(s) Oral at bedtime PRN Insomnia  ondansetron Injectable 4 milliGRAM(s) IV Push every 8 hours PRN Nausea and/or Vomiting    T(C): 36.7 (01-01-25 @ 11:20), Max: 38.7 (12-31-24 @ 16:10)  HR: 103 (01-01-25 @ 11:20) (99 - 126)  BP: 104/64 (01-01-25 @ 11:20) (92/52 - 121/75)  RR: 18 (01-01-25 @ 11:20) (18 - 18)  SpO2: 97% (01-01-25 @ 11:20) (94% - 98%)    CAPILLARY BLOOD GLUCOSE        I&O's Summary    31 Dec 2024 07:01  -  01 Jan 2025 07:00  --------------------------------------------------------  IN: 0 mL / OUT: 450 mL / NET: -450 mL        Constitutional: No fever, fatigue  Skin: No rash.  Eyes: No recent vision problems or eye pain.  ENT: No congestion, ear pain, or sore throat.  Cardiovascular: No chest pain or palpation.  Respiratory: No cough,+ shortness of breath, congestion, or wheezing.  Gastrointestinal: No abdominal pain, nausea, vomiting, or diarrhea.  Genitourinary: No dysuria.  Musculoskeletal: No joint swelling.  Neurologic: No headache.    PHYSICAL EXAM:  GENERAL: NAD  EYES: EOMI, PERRLA  NECK: Supple, No JVD  CHEST/LUNG: dec breath sounds at bases  HEART:  S1 , S2 +  ABDOMEN: soft , bs+  EXTREMITIES:  edema  NEUROLOGY:alert awake      LABS:                        7.5    8.49  )-----------( 180      ( 01 Jan 2025 05:43 )             23.9     01-01    135  |  103  |  15  ----------------------------<  132[H]  3.5   |  22  |  0.85    Ca    8.8      01 Jan 2025 05:43  Phos  1.9     01-01  Mg     2.2     01-01    TPro  7.7  /  Alb  2.1[L]  /  TBili  1.2  /  DBili  x   /  AST  21  /  ALT  23  /  AlkPhos  99  12-31    PT/INR - ( 31 Dec 2024 03:59 )   PT: 24.9 sec;   INR: 2.14 ratio         PTT - ( 31 Dec 2024 03:59 )  PTT:42.2 sec      Urinalysis Basic - ( 01 Jan 2025 05:43 )    Color: x / Appearance: x / SG: x / pH: x  Gluc: 132 mg/dL / Ketone: x  / Bili: x / Urobili: x   Blood: x / Protein: x / Nitrite: x   Leuk Esterase: x / RBC: x / WBC x   Sq Epi: x / Non Sq Epi: x / Bacteria: x        Culture - Abscess with Gram Stain (collected 12-31-24 @ 19:30)  Source: .Abscess  Gram Stain (01-01-25 @ 03:01):    Moderate polymorphonuclear leukocytes seen per low power field    Moderate Gram Negative Rods seen per oil power field    Moderate Gram Positive Cocci in Clusters seen per oil power field      RADIOLOGY & ADDITIONAL TESTS:    Imaging Personally Reviewed:    Consultant(s) Notes Reviewed:      Care Discussed with Consultants/Other Providers:

## 2025-01-01 NOTE — CONSULT NOTE ADULT - SKIN COMMENTS
left breast wound packed and dressed, no cellulitis of the surrounding skin but has prominence of her veins over her breast

## 2025-01-01 NOTE — CONSULT NOTE ADULT - ASSESSMENT
Patient is a 39yF, recently diagnosed with Left breast CA in nov 2024 on chemo. Recently in [facility] for bilateral PE refused thrombectomy initially, later agreeable however providers recommended medical management with eliquis. Patient presented to the ED with continued cough and sob on exertion which started when the PE was discovered. In the ED febrile Tmax 101, tachycardic. On exam patient has a left breast ulcer which has been present since the biopsy was performed in november. Patient is being admitted for Sepsis 2/2 Left breast ulcer and management of PE.        Problem/Plan - 1:  ·  Problem: Sepsis.   ·  Plan: p/w fever, HR >90, tachypneic, lactate wnl  s/p biopsy of left breast in mid november 2024, since there is chronic drainage and open wound.   serosanguinous, malodorous ulcer   surgery f/u   ID eval  f/u cx.     Problem/Plan - 2:  ·  Problem: Ulcer of skin of breast.   ·  Plan: ulcer present on left lateral lower quadrant of breast since mid november post biopsy  since then ulcer present and chronically draining: malodorous serosangionous bloody discharge daily  Left breast enlarged and appears to have a peu d'orange appearance compared to right breast  surgery consulted for possible intervention.     Problem/Plan - 3:  ·  Problem: Breast cancer.   ·  Plan: recently diagnosed with left  breast cancer in Nov 2024.   underwent 1 session of chemotherapy on Dec 5th as per patient it was 6 hour infusion after which she developed lethargy, nausea and vomiting  follows Dr. Quezada at [facility]  heme onc consult reviewed.     Problem/Plan - 4:  ·  Problem: Pulmonary embolism.   ·  Plan: At [facility] diagnosed with bilateral PEs, patient refused thrombectomy initially hence started on medical management for PE.  cont ac   incentive spirometry   pulm eval.

## 2025-01-01 NOTE — CONSULT NOTE ADULT - TIME BILLING
- Review of records, telemetry, vital signs and daily labs.   - General and cardiovascular physical examination.  - Generation of cardiovascular treatment plan and completion of note .  - Coordination of care.      Patient was seen and examined by me on 1/1/25 ,interim events noted,labs and radiology studies reviewed.  John Orta MD,FACC.  [address] [place], [place] [id number].  [phone]  Availabe to call or text on [employer] TEAMS.
Chart review, patient examination and documentation.

## 2025-01-01 NOTE — CONSULT NOTE ADULT - SUBJECTIVE AND OBJECTIVE BOX
PATIENT SEEN AND EXAMINED BY ZAID LORD M.D. ON :- 1/1/25  DATE OF SERVICE:       1/1/25      Interim events noted,Labs ,Radiological studies and Cardiology tests reviewed .    Patient is a 39y old  Female who presents with a chief complaint of SOB (01 Jan 2025 18:16)      HPI:  Patient is a 39yF, recently diagnosed with Left breast CA in nov 2024 on chemo. Patient was previously at [facility] in early december for SOB and chest pain found to have bilateral pulm emboli during the stay patient was offered thrombectomy however patient declined and later changed her mind during the stay however providers recommended the patient continue her medical management Eliquis and Rivaroxaban). Patient follows Dr. Damien STALLINGS and received her first session of chemotherapy on December 5th as per the patient the infusion lasted 6hours and since then she has been having fatigue, sob on exertion, chronic productive cough, nausea and vomiting since the chemotherapy. Patient reports subjective fevers however did not measure the temperature at home. She denies palpitations, chest pain, lightheadedness, syncopal episodes, hemoptysis, hematemesis, hematochezia, melena.  On further questioning patient disclosed she has a biopsy done on her left breast in mid november. The site now appears to have an ulcer. She notes she has chronic discharge serosanguinous occasionally bloody, malodorous.    (31 Dec 2024 12:50)      PAST MEDICAL & SURGICAL HISTORY:  Breast cancer      Pulmonary embolism      No significant past surgical history          PREVIOUS DIAGNOSTIC TESTING:      ECHO  FINDINGS:    STRESS  FINDINGS:    CATHETERIZATION  FINDINGS:    MEDICATIONS  (STANDING):  apixaban 5 milliGRAM(s) Oral every 12 hours  folic acid 1 milliGRAM(s) Oral daily  guaiFENesin Oral Liquid (Sugar-Free) 200 milliGRAM(s) Oral every 6 hours  piperacillin/tazobactam IVPB.- 3.375 Gram(s) IV Intermittent once    MEDICATIONS  (PRN):  acetaminophen     Tablet .. 650 milliGRAM(s) Oral every 6 hours PRN Temp greater or equal to 38C (100.4F), Mild Pain (1 - 3)  aluminum hydroxide/magnesium hydroxide/simethicone Suspension 30 milliLiter(s) Oral every 4 hours PRN Dyspepsia  melatonin 3 milliGRAM(s) Oral at bedtime PRN Insomnia  ondansetron Injectable 4 milliGRAM(s) IV Push every 8 hours PRN Nausea and/or Vomiting      FAMILY HISTORY:  No pertinent family history in first degree relatives        SOCIAL HISTORY:    CIGARETTES:    ALCOHOL:    REVIEW OF SYSTEMS:  CONSTITUTIONAL: No fever, weight loss, or fatigue  EYES: No eye pain, visual disturbances, or discharge  ENMT:  No difficulty hearing, tinnitus, vertigo; No sinus or throat pain  NECK: No pain or stiffness  RESPIRATORY: No cough, wheezing, chills or hemoptysis; No shortness of breath  CARDIOVASCULAR: No chest pain, palpitations, dizziness, or leg swelling  GASTROINTESTINAL: No abdominal or epigastric pain. No nausea, vomiting, or hematemesis; No diarrhea or constipation. No melena or hematochezia.  GENITOURINARY: No dysuria, frequency, hematuria, or incontinence  NEUROLOGICAL: No headaches, memory loss, loss of strength, numbness, or tremors  SKIN: No itching, burning, rashes, or lesions   LYMPH NODES: No enlarged glands  ENDOCRINE: No heat or cold intolerance; No hair loss  MUSCULOSKELETAL: No joint pain or swelling; No muscle, back, or extremity pain  PSYCHIATRIC: No depression, anxiety, mood swings, or difficulty sleeping  HEME/LYMPH: No easy bruising, or bleeding gums  ALLERY AND IMMUNOLOGIC: No hives or eczema    Vital Signs Last 24 Hrs  T(C): 36.7 (01 Jan 2025 20:44), Max: 37.1 (31 Dec 2024 23:52)  T(F): 98.1 (01 Jan 2025 20:44), Max: 98.8 (01 Jan 2025 17:26)  HR: 98 (01 Jan 2025 20:44) (97 - 122)  BP: 105/63 (01 Jan 2025 20:44) (100/65 - 106/58)  BP(mean): --  RR: 19 (01 Jan 2025 20:44) (18 - 19)  SpO2: 99% (01 Jan 2025 20:44) (96% - 99%)    Parameters below as of 01 Jan 2025 20:44  Patient On (Oxygen Delivery Method): room air          PHYSICAL EXAM:  GENERAL: NAD, well-groomed, well-developed  HEAD:  Atraumatic, Normocephalic  EYES: EOMI, PERRLA, conjunctiva and sclera clear  ENMT: No tonsillar erythema, exudates, or enlargement; Moist mucous membranes, Good dentition, No lesions  NECK: Supple, No JVD, Normal thyroid  NERVOUS SYSTEM:  Alert & Oriented X3, Good concentration; Motor Strength 5/5 B/L upper and lower extremities; DTRs 2+ intact and symmetric  CHEST/LUNG: Clear to percussion bilaterally; No rales, rhonchi, wheezing, or rubs  HEART: Regular rate and rhythm; No murmurs, rubs, or gallops  ABDOMEN: Soft, Nontender, Nondistended; Bowel sounds present  EXTREMITIES:  2+ Peripheral Pulses, No clubbing, cyanosis, or edema  LYMPH: No lymphadenopathy noted  SKIN: No rashes or lesions      INTERPRETATION OF TELEMETRY:    ECG:    [facility]:     LABS:                        7.5    8.49  )-----------( 180      ( 01 Jan 2025 05:43 )             23.9     01-01    135  |  103  |  15  ----------------------------<  132[H]  3.5   |  22  |  0.85    Ca    8.8      01 Jan 2025 05:43  Phos  1.9     01-01  Mg     2.2     01-01    TPro  7.7  /  Alb  2.1[L]  /  TBili  1.2  /  DBili  x   /  AST  21  /  ALT  23  /  AlkPhos  99  12-31        PT/INR - ( 31 Dec 2024 03:59 )   PT: 24.9 sec;   INR: 2.14 ratio         PTT - ( 31 Dec 2024 03:59 )  PTT:42.2 sec  Urinalysis Basic - ( 01 Jan 2025 05:43 )    Color: x / Appearance: x / SG: x / pH: x  Gluc: 132 mg/dL / Ketone: x  / Bili: x / Urobili: x   Blood: x / Protein: x / Nitrite: x   Leuk Esterase: x / RBC: x / WBC x   Sq Epi: x / Non Sq Epi: x / Bacteria: x      Lipid Panel:   I&O's Summary    31 Dec 2024 07:01  -  01 Jan 2025 07:00  --------------------------------------------------------  IN: 0 mL / OUT: 450 mL / NET: -450 mL        RADIOLOGY & ADDITIONAL STUDIES:    < from: TTE W or WO Ultrasound Enhancing Agent (12.12.24 @ 18:31) >     CONCLUSIONS:      1. Technically difficulty study.   2. Left ventricular endocardium is not well visualized; however, the left ventricular systolic function appears grossly normal.   3. There is poor visualization of all the endocardial borders to determine the presence of wall motion abnormalities. Probably normal.   4. The right ventricle is not well visualized. Based on visual assessment, the right ventricle appears normal in size and right ventricular systolic function is normal.   5. Unable to exclude the presence of right atrium, superior or inferior vena cava mass.   6. No significant valvular disease within study limitations.   7. No prior echocardiogram is available for comparison.    < end of copied text >

## 2025-01-01 NOTE — PROGRESS NOTE ADULT - ASSESSMENT
Patient is a 39yF, recently diagnosed with Left breast CA in nov 2024 on chemo. Recently in [facility] for bilateral PE refused thrombectomy initially, later agreeable however providers recommended medical management with eliquis. Patient presented to the ED with continued cough and sob on exertion which started when the PE was discovered. In the ED febrile Tmax 101, tachycardic. On exam patient has a left breast ulcer which has been present since the biopsy was performed in november. Patient is being admitted for Sepsis 2/2 Left breast ulcer and management of PE.

## 2025-01-01 NOTE — CONSULT NOTE ADULT - SUBJECTIVE AND OBJECTIVE BOX
HPI:  Patient is a 39yF, recently diagnosed with Left breast CA in nov 2024 on chemo. Patient was previously at [facility] in early december for SOB and chest pain found to have bilateral pulm emboli during the stay patient was offered thrombectomy however patient declined and later changed her mind during the stay however providers recommended the patient continue her medical management Eliquis and Rivaroxaban). Patient follows Dr. Damien STALLINGS and received her first session of chemotherapy on December 5th as per the patient the infusion lasted 6hours and since then she has been having fatigue, sob on exertion, chronic productive cough, nausea and vomiting since the chemotherapy. Patient reports subjective fevers however did not measure the temperature at home. She denies palpitations, chest pain, lightheadedness, syncopal episodes, hemoptysis, hematemesis, hematochezia, melena.  On further questioning patient disclosed she has a biopsy done on her left breast in mid november. The site now appears to have an ulcer. She notes she has chronic discharge serosanguinous occasionally bloody, malodorous.    (31 Dec 2024 12:50)                    PAST MEDICAL & SURGICAL HISTORY:  Breast cancer      Pulmonary embolism      No significant past surgical history          No Known Allergies      Meds:  acetaminophen     Tablet .. 650 milliGRAM(s) Oral every 6 hours PRN  aluminum hydroxide/magnesium hydroxide/simethicone Suspension 30 milliLiter(s) Oral every 4 hours PRN  apixaban 5 milliGRAM(s) Oral every 12 hours  folic acid 1 milliGRAM(s) Oral daily  guaiFENesin Oral Liquid (Sugar-Free) 200 milliGRAM(s) Oral every 6 hours  melatonin 3 milliGRAM(s) Oral at bedtime PRN  ondansetron Injectable 4 milliGRAM(s) IV Push every 8 hours PRN      SOCIAL HISTORY:  Smoker:  YES / NO        PACK YEARS:                         WHEN QUIT?  ETOH use:  YES / NO               FREQUENCY / QUANTITY:  Ilicit Drug use:  YES / NO  Occupation:  Assisted device use (Cane / Walker):  Live with:    FAMILY HISTORY:  No pertinent family history in first degree relatives        VITALS:  Vital Signs Last 24 Hrs  T(C): 37.1 (01 Jan 2025 17:26), Max: 37.4 (31 Dec 2024 19:38)  T(F): 98.8 (01 Jan 2025 17:26), Max: 99.3 (31 Dec 2024 19:38)  HR: 97 (01 Jan 2025 17:26) (97 - 122)  BP: 100/65 (01 Jan 2025 17:26) (92/52 - 106/58)  BP(mean): --  RR: 18 (01 Jan 2025 17:26) (18 - 18)  SpO2: 99% (01 Jan 2025 17:26) (94% - 99%)    Parameters below as of 01 Jan 2025 17:26  Patient On (Oxygen Delivery Method): room air        LABS/DIAGNOSTIC TESTS:                          7.5    8.49  )-----------( 180      ( 01 Jan 2025 05:43 )             23.9     WBC Count: 8.49 K/uL (01-01 @ 05:43)  WBC Count: 9.02 K/uL (12-31 @ 03:59)      01-01    135  |  103  |  15  ----------------------------<  132[H]  3.5   |  22  |  0.85    Ca    8.8      01 Jan 2025 05:43  Phos  1.9     01-01  Mg     2.2     01-01    TPro  7.7  /  Alb  2.1[L]  /  TBili  1.2  /  DBili  x   /  AST  21  /  ALT  23  /  AlkPhos  99  12-31      Urinalysis Basic - ( 01 Jan 2025 05:43 )    Color: x / Appearance: x / SG: x / pH: x  Gluc: 132 mg/dL / Ketone: x  / Bili: x / Urobili: x   Blood: x / Protein: x / Nitrite: x   Leuk Esterase: x / RBC: x / WBC x   Sq Epi: x / Non Sq Epi: x / Bacteria: x        LIVER FUNCTIONS - ( 31 Dec 2024 03:59 )  Alb: 2.1 g/dL / Pro: 7.7 g/dL / ALK PHOS: 99 U/L / ALT: 23 U/L DA / AST: 21 U/L / GGT: x             PT/INR - ( 31 Dec 2024 03:59 )   PT: 24.9 sec;   INR: 2.14 ratio         PTT - ( 31 Dec 2024 03:59 )  PTT:42.2 sec    LACTATE:    ABG -     CULTURES:   .Abscess  12-31 @ 19:30 --  --    Moderate polymorphonuclear leukocytes seen per low power field  Moderate Gram Negative Rods seen per oil power field  Moderate Gram Positive Cocci in Clusters seen per oil power field      .Blood BLOOD  12-31 @ 07:30   No growth at 24 hours  --  --      .Blood BLOOD  12-31 @ 07:20   No growth at 24 hours  --  --      .Blood BLOOD  12-14 @ 01:46   No growth at 5 days  --  --      Clean Catch Clean Catch (Midstream)  12-12 @ 13:02   <10,000 CFU/mL Normal Urogenital Fallon  --  --          RADIOLOGY:      ROS  [  ] UNABLE TO ELICIT               HPI:  Patient is a 39yF, recently diagnosed with Left breast CA in nov 2024 on chemo. Patient was previously at [facility] in early december for SOB and chest pain found to have bilateral pulm emboli during the stay patient was offered thrombectomy however patient declined and later changed her mind during the stay however providers recommended the patient continue her medical management Eliquis and Rivaroxaban). Patient follows Dr. Damien STALLINGS and received her first session of chemotherapy on December 5th as per the patient the infusion lasted 6hours and since then she has been having fatigue, sob on exertion, chronic productive cough, nausea and vomiting since the chemotherapy. Patient reports subjective fevers however did not measure the temperature at home. She denies palpitations, chest pain, lightheadedness, syncopal episodes, hemoptysis, hematemesis, hematochezia, melena.  On further questioning patient disclosed she has a biopsy done on her left breast in mid november. The site now appears to have an ulcer. She notes she has chronic discharge serosanguinous occasionally bloody, malodorous.    (31 Dec 2024 12:50)        History as above, asked to see this patient who was recently diagnosed with left breast ca and had a left             PAST MEDICAL & SURGICAL HISTORY:  Breast cancer      Pulmonary embolism      No significant past surgical history          No Known Allergies      Meds:  acetaminophen     Tablet .. 650 milliGRAM(s) Oral every 6 hours PRN  aluminum hydroxide/magnesium hydroxide/simethicone Suspension 30 milliLiter(s) Oral every 4 hours PRN  apixaban 5 milliGRAM(s) Oral every 12 hours  folic acid 1 milliGRAM(s) Oral daily  guaiFENesin Oral Liquid (Sugar-Free) 200 milliGRAM(s) Oral every 6 hours  melatonin 3 milliGRAM(s) Oral at bedtime PRN  ondansetron Injectable 4 milliGRAM(s) IV Push every 8 hours PRN      SOCIAL HISTORY:  Smoker:  YES / NO        PACK YEARS:                         WHEN QUIT?  ETOH use:  YES / NO               FREQUENCY / QUANTITY:  Ilicit Drug use:  YES / NO  Occupation:  Assisted device use (Cane / Walker):  Live with:    FAMILY HISTORY:  No pertinent family history in first degree relatives        VITALS:  Vital Signs Last 24 Hrs  T(C): 37.1 (01 Jan 2025 17:26), Max: 37.4 (31 Dec 2024 19:38)  T(F): 98.8 (01 Jan 2025 17:26), Max: 99.3 (31 Dec 2024 19:38)  HR: 97 (01 Jan 2025 17:26) (97 - 122)  BP: 100/65 (01 Jan 2025 17:26) (92/52 - 106/58)  BP(mean): --  RR: 18 (01 Jan 2025 17:26) (18 - 18)  SpO2: 99% (01 Jan 2025 17:26) (94% - 99%)    Parameters below as of 01 Jan 2025 17:26  Patient On (Oxygen Delivery Method): room air        LABS/DIAGNOSTIC TESTS:                          7.5    8.49  )-----------( 180      ( 01 Jan 2025 05:43 )             23.9     WBC Count: 8.49 K/uL (01-01 @ 05:43)  WBC Count: 9.02 K/uL (12-31 @ 03:59)      01-01    135  |  103  |  15  ----------------------------<  132[H]  3.5   |  22  |  0.85    Ca    8.8      01 Jan 2025 05:43  Phos  1.9     01-01  Mg     2.2     01-01    TPro  7.7  /  Alb  2.1[L]  /  TBili  1.2  /  DBili  x   /  AST  21  /  ALT  23  /  AlkPhos  99  12-31      Urinalysis Basic - ( 01 Jan 2025 05:43 )    Color: x / Appearance: x / SG: x / pH: x  Gluc: 132 mg/dL / Ketone: x  / Bili: x / Urobili: x   Blood: x / Protein: x / Nitrite: x   Leuk Esterase: x / RBC: x / WBC x   Sq Epi: x / Non Sq Epi: x / Bacteria: x        LIVER FUNCTIONS - ( 31 Dec 2024 03:59 )  Alb: 2.1 g/dL / Pro: 7.7 g/dL / ALK PHOS: 99 U/L / ALT: 23 U/L DA / AST: 21 U/L / GGT: x             PT/INR - ( 31 Dec 2024 03:59 )   PT: 24.9 sec;   INR: 2.14 ratio         PTT - ( 31 Dec 2024 03:59 )  PTT:42.2 sec    LACTATE:    ABG -     CULTURES:   .Abscess  12-31 @ 19:30 --  --    Moderate polymorphonuclear leukocytes seen per low power field  Moderate Gram Negative Rods seen per oil power field  Moderate Gram Positive Cocci in Clusters seen per oil power field      .Blood BLOOD  12-31 @ 07:30   No growth at 24 hours  --  --      .Blood BLOOD  12-31 @ 07:20   No growth at 24 hours  --  --      .Blood BLOOD  12-14 @ 01:46   No growth at 5 days  --  --      Clean Catch Clean Catch (Midstream)  12-12 @ 13:02   <10,000 CFU/mL Normal Urogenital Fallon  --  --          RADIOLOGY:      ROS  [  ] UNABLE TO ELICIT               HPI:  Patient is a 39yF, recently diagnosed with Left breast CA in nov 2024 on chemo. Patient was previously at [facility] in early december for SOB and chest pain found to have bilateral pulm emboli during the stay patient was offered thrombectomy however patient declined and later changed her mind during the stay however providers recommended the patient continue her medical management Eliquis and Rivaroxaban). Patient follows Dr. Damien STALLINGS and received her first session of chemotherapy on December 5th as per the patient the infusion lasted 6hours and since then she has been having fatigue, sob on exertion, chronic productive cough, nausea and vomiting since the chemotherapy. Patient reports subjective fevers however did not measure the temperature at home. She denies palpitations, chest pain, lightheadedness, syncopal episodes, hemoptysis, hematemesis, hematochezia, melena.  On further questioning patient disclosed she has a biopsy done on her left breast in mid november. The site now appears to have an ulcer. She notes she has chronic discharge serosanguinous occasionally bloody, malodorous.    (31 Dec 2024 12:50)        History as above, asked to see this patient who was recently diagnosed with left breast ca and had a left breast biopsy which did not heal as per the patient, she was started on Chemo in November of 2024 and was also found to have bilat PE in December at [facility]. She presents with severe fatigue, a cough , nausea and vomiting and felt feverish at home. She has been having serosanguinous drainage from her left breast biopsy site. Asked to eval this patient for a left breast abscess. A surgeon saw her here and did a I&D of the breast ulcer with expression of a few ml of pus/ necrotic fluid. The patient is a very poor historian and is very emotional at this time. She has no fevers here and her WBC count is WNL.            PAST MEDICAL & SURGICAL HISTORY:  Breast cancer      Pulmonary embolism      No significant past surgical history          No Known Allergies      Meds:  acetaminophen     Tablet .. 650 milliGRAM(s) Oral every 6 hours PRN  aluminum hydroxide/magnesium hydroxide/simethicone Suspension 30 milliLiter(s) Oral every 4 hours PRN  apixaban 5 milliGRAM(s) Oral every 12 hours  folic acid 1 milliGRAM(s) Oral daily  guaiFENesin Oral Liquid (Sugar-Free) 200 milliGRAM(s) Oral every 6 hours  melatonin 3 milliGRAM(s) Oral at bedtime PRN  ondansetron Injectable 4 milliGRAM(s) IV Push every 8 hours PRN      SOCIAL HISTORY:  Smoker:  no  ETOH use:  no    FAMILY HISTORY:  No pertinent family history in first degree relatives, no history of breast ca in family.        VITALS:  Vital Signs Last 24 Hrs  T(C): 37.1 (01 Jan 2025 17:26), Max: 37.4 (31 Dec 2024 19:38)  T(F): 98.8 (01 Jan 2025 17:26), Max: 99.3 (31 Dec 2024 19:38)  HR: 97 (01 Jan 2025 17:26) (97 - 122)  BP: 100/65 (01 Jan 2025 17:26) (92/52 - 106/58)  BP(mean): --  RR: 18 (01 Jan 2025 17:26) (18 - 18)  SpO2: 99% (01 Jan 2025 17:26) (94% - 99%)    Parameters below as of 01 Jan 2025 17:26  Patient On (Oxygen Delivery Method): room air        LABS/DIAGNOSTIC TESTS:                          7.5    8.49  )-----------( 180      ( 01 Jan 2025 05:43 )             23.9     WBC Count: 8.49 K/uL (01-01 @ 05:43)  WBC Count: 9.02 K/uL (12-31 @ 03:59)      01-01    135  |  103  |  15  ----------------------------<  132[H]  3.5   |  22  |  0.85    Ca    8.8      01 Jan 2025 05:43  Phos  1.9     01-01  Mg     2.2     01-01    TPro  7.7  /  Alb  2.1[L]  /  TBili  1.2  /  DBili  x   /  AST  21  /  ALT  23  /  AlkPhos  99  12-31      Urine Microscopic-Add On (NC) (12.31.24 @ 06:39)   Red Blood Cell - Urine: 0 /HPF  White Blood Cell - Urine: 3 /HPF  Bacteria: Many /HPF  Comment - Urine: few amorphous urates  Squamous Epithelial Cells: PresentUrinalysis with Rflx Culture (12.31.24 @ 06:39)   Urine Appearance: Cloudy  Color: Dark Yellow  Specific Gravity: 1.022  pH Urine: 5.5  Protein, Urine: 100 mg/dL  Glucose Qualitative, Urine: Negative mg/dL  Ketone - Urine: Trace mg/dL  Blood, Urine: Negative  Bilirubin: Small  Urobilinogen: 1.0 mg/dL  Leukocyte Esterase Concentration: Trace  Nitrite: Negative      LIVER FUNCTIONS - ( 31 Dec 2024 03:59 )  Alb: 2.1 g/dL / Pro: 7.7 g/dL / ALK PHOS: 99 U/L / ALT: 23 U/L DA / AST: 21 U/L / GGT: x             PT/INR - ( 31 Dec 2024 03:59 )   PT: 24.9 sec;   INR: 2.14 ratio         PTT - ( 31 Dec 2024 03:59 )  PTT:42.2 sec    LACTATE:    ABG -     CULTURES:   .Abscess  12-31 @ 19:30 --  --    Moderate polymorphonuclear leukocytes seen per low power field  Moderate Gram Negative Rods seen per oil power field  Moderate Gram Positive Cocci in Clusters seen per oil power field      .Blood BLOOD  12-31 @ 07:30   No growth at 24 hours  --  --      .Blood BLOOD  12-31 @ 07:20   No growth at 24 hours  --  --      .Blood BLOOD  12-14 @ 01:46   No growth at 5 days  --  --      Clean Catch Clean Catch (Midstream)  12-12 @ 13:02   <10,000 CFU/mL Normal Urogenital Fallon  --  --          RADIOLOGY:< from: CT Angio Chest PE Protocol w/ IV Cont (12.31.24 @ 08:34) >  ACC: [id number] EXAM:  CT ANGIO CHEST PULM [facility]   ORDERED BY: LANI TURNER     PROCEDURE DATE:  12/31/2024          INTERPRETATION:  Clinical information: Chest pain. History of metastatic   breast cancer. Evaluate for pulmonary embolus. Exam is compared to   previous study of 12/12/2024.    CT angiogram of the chest was obtained following administration of   intravenous contrast. Approximately 60 cc of Omnipaque 350 was   administered in 40 cc was discarded. Coronal, sagittal and MIP images   were submitted for review.    Once again, a large mass is noted within the left breast. This is   incompletely imaged on this exam. Marked hazy changes within the left   breast as well as thickening of the skin overlying the left breast are   noted. Multiple lymph nodes are present in the left axilla/left   subpectoral region.    No hilar or mediastinal adenopathy is noted.    Heart is normal in size. No pericardial effusion is noted. Once again,   filling defect is noted within the segmentalbranches of the right lower   lobe pulmonary artery. Appearance is unchanged when compared to previous   exam. No new filling defects are noted. Right-sided Mediport catheter is   noted in place.    No endobronchial lesions are noted. Peripherally situated opacity   containing central lucency is noted in the posterior segment of the right   lower lobe. Trace right pleural effusion is noted.    Below the diaphragm, visualized portions of the abdomen are unremarkable.    Visualized osseous structures are within normal limits.    IMPRESSION: No significant change in the pulmonary embolus in the   segmental branch of the right lower lobe pulmonary artery when compared   to previous exam.    Findings suggestive of pulmonary infarct in the posterior segment of the   right lower lobe.    --- End of Report ---            KIP BOYLE MD; Attending Radiologist  This document has been electronically signed. Dec 31 2024  8:42AM    < end of copied text >        ROS  [  ] UNABLE TO ELICIT

## 2025-01-01 NOTE — CONSULT NOTE ADULT - SUBJECTIVE AND OBJECTIVE BOX
Reason for consult: breast cancer    HPI:    Patient is a 39yF, recently diagnosed with Left breast CA , bilateral pulm emboli on DAOC is being evaluated for sepsis. Patient follows Dr. Damien STALLINGS and received her first session of chemotherapy on December 5th and since then she has been having fatigue, sob on exertion, chronic productive cough, nausea and vomiting since the chemotherapy. Patient reports subjective fevers however did not measure the temperature at home. On further questioning patient disclosed she has a biopsy done on her left breast in mid november. The site now appears to have an ulcer. She notes she has chronic discharge serosanguinous occasionally bloody, malodorous.     Oncology consulted given h/o inflammatory, T4N3, triple negative breast cancer,  Currently on neoadjuvant chemotherapy with carboplatin AUC5 D1 + Taxol 80mg/m2 D1,8,15 + Keytruda 200mg D1 Q21 days, LD 12/5.      PAST MEDICAL & SURGICAL HISTORY:  Breast cancer      Pulmonary embolism      No significant past surgical history          FAMILY HISTORY:  No pertinent family history in first degree relatives        Alochol: Denied  Smoking: Nonsmoker  Drug Use: Denied  Marital Status:         Allergies    No Known Allergies    Intolerances        MEDICATIONS  (STANDING):  apixaban 5 milliGRAM(s) Oral every 12 hours  folic acid 1 milliGRAM(s) Oral daily  guaiFENesin Oral Liquid (Sugar-Free) 200 milliGRAM(s) Oral every 6 hours    MEDICATIONS  (PRN):  acetaminophen     Tablet .. 650 milliGRAM(s) Oral every 6 hours PRN Temp greater or equal to 38C (100.4F), Mild Pain (1 - 3)  aluminum hydroxide/magnesium hydroxide/simethicone Suspension 30 milliLiter(s) Oral every 4 hours PRN Dyspepsia  melatonin 3 milliGRAM(s) Oral at bedtime PRN Insomnia  ondansetron Injectable 4 milliGRAM(s) IV Push every 8 hours PRN Nausea and/or Vomiting      ROS  Negative except per HPI    T(C): 36.8 (01-01-25 @ 04:43), Max: 38.7 (12-31-24 @ 16:10)  HR: 117 (01-01-25 @ 04:43) (99 - 126)  BP: 106/58 (01-01-25 @ 04:43) (92/52 - 121/75)  RR: 18 (01-01-25 @ 04:43) (18 - 19)  SpO2: 96% (01-01-25 @ 04:43) (94% - 100%)  Wt(kg): --    PE  NAD  Awake, alert  Anicteric, MMM  RRR  CTAB  Abd soft, NT, ND  No c/c/e  No rash grossly  FROM                          7.5    8.49  )-----------( 180      ( 01 Jan 2025 05:43 )             23.9       01-01    135  |  103  |  15  ----------------------------<  132[H]  3.5   |  22  |  0.85    Ca    8.8      01 Jan 2025 05:43  Phos  1.9     01-01  Mg     2.2     01-01    TPro  7.7  /  Alb  2.1[L]  /  TBili  1.2  /  DBili  x   /  AST  21  /  ALT  23  /  AlkPhos  99  12-31   Reason for consult: breast cancer    HPI:    Patient is a 39yF, recently diagnosed with Left breast CA , bilateral pulm emboli on DAOC is being evaluated for sepsis. Patient follows Dr. Damien STALLINGS and received her first session of chemotherapy on December 5th and since then she has been having fatigue, sob on exertion, chronic productive cough, nausea and vomiting since the chemotherapy. Patient reports subjective fevers however did not measure the temperature at home. On further questioning patient disclosed she has a biopsy done on her left breast in mid november. The site now appears to have an ulcer. She notes she has chronic discharge serosanguinous occasionally bloody, malodorous.     Oncology consulted given h/o inflammatory, T4N3, triple negative breast cancer,  Currently on neoadjuvant chemotherapy with carboplatin AUC5 D1 + Taxol 80mg/m2 D1,8,15 + Keytruda 200mg D1 Q21 days, LD 12/5.  Patient has lost to follow up since then, thinking that the chemotherapy had caused the PE    PAST MEDICAL & SURGICAL HISTORY:  Breast cancer      Pulmonary embolism      No significant past surgical history          FAMILY HISTORY:  No pertinent family history in first degree relatives        Alochol: Denied  Smoking: Nonsmoker  Drug Use: Denied  Marital Status:         Allergies    No Known Allergies    Intolerances        MEDICATIONS  (STANDING):  apixaban 5 milliGRAM(s) Oral every 12 hours  folic acid 1 milliGRAM(s) Oral daily  guaiFENesin Oral Liquid (Sugar-Free) 200 milliGRAM(s) Oral every 6 hours    MEDICATIONS  (PRN):  acetaminophen     Tablet .. 650 milliGRAM(s) Oral every 6 hours PRN Temp greater or equal to 38C (100.4F), Mild Pain (1 - 3)  aluminum hydroxide/magnesium hydroxide/simethicone Suspension 30 milliLiter(s) Oral every 4 hours PRN Dyspepsia  melatonin 3 milliGRAM(s) Oral at bedtime PRN Insomnia  ondansetron Injectable 4 milliGRAM(s) IV Push every 8 hours PRN Nausea and/or Vomiting      ROS  Negative except per HPI    T(C): 36.8 (01-01-25 @ 04:43), Max: 38.7 (12-31-24 @ 16:10)  HR: 117 (01-01-25 @ 04:43) (99 - 126)  BP: 106/58 (01-01-25 @ 04:43) (92/52 - 121/75)  RR: 18 (01-01-25 @ 04:43) (18 - 19)  SpO2: 96% (01-01-25 @ 04:43) (94% - 100%)  Wt(kg): --    PE  NAD  Awake, alert  Anicteric, MMM  RRR  CTAB  Abd soft, NT, ND  No c/c/e  No rash grossly  FROM                          7.5    8.49  )-----------( 180      ( 01 Jan 2025 05:43 )             23.9       01-01    135  |  103  |  15  ----------------------------<  132[H]  3.5   |  22  |  0.85    Ca    8.8      01 Jan 2025 05:43  Phos  1.9     01-01  Mg     2.2     01-01    TPro  7.7  /  Alb  2.1[L]  /  TBili  1.2  /  DBili  x   /  AST  21  /  ALT  23  /  AlkPhos  99  12-31

## 2025-01-01 NOTE — CONSULT NOTE ADULT - SUBJECTIVE AND OBJECTIVE BOX
Time of visit: mother at bedside     CHIEF COMPLAINT: Patient is a 39y old  Female who presents with a chief complaint of     HPI: This is a 39 yr old woman , recently diagnosed with Left breast CA in nov 2024 on chemo. Patient was previously at [facility] in early december for SOB and chest pain found to have bilateral pulm emboli during the stay patient was offered thrombectomy however patient declined and later changed her mind during the stay however providers recommended the patient continue her medical management Eliquis and Rivaroxaban). Patient follows Dr. Damien STALLINGS and received her first session of chemotherapy on December 5th as per the patient the infusion lasted 6hours and since then she has been having fatigue, sob on exertion, chronic productive cough, nausea and vomiting since the chemotherapy. Patient reports subjective fevers however did not measure the temperature at home. She denies palpitations, chest pain, lightheadedness, syncopal episodes, hemoptysis, hematemesis, hematochezia, melena.  On further questioning patient disclosed she has a biopsy done on her left breast in mid november. The site now appears to have an ulcer. She notes she has chronic discharge serosanguinous occasionally bloody, malodorous.    (31 Dec 2024 12:50)   Patient seen and examined.     PAST MEDICAL & SURGICAL HISTORY:  Breast cancer      Pulmonary embolism      No significant past surgical history          Allergies    No Known Allergies    Intolerances        MEDICATIONS  (STANDING):  apixaban 5 milliGRAM(s) Oral every 12 hours  folic acid 1 milliGRAM(s) Oral daily  guaiFENesin Oral Liquid (Sugar-Free) 200 milliGRAM(s) Oral every 6 hours  piperacillin/tazobactam IVPB.- 3.375 Gram(s) IV Intermittent once      MEDICATIONS  (PRN):  acetaminophen     Tablet .. 650 milliGRAM(s) Oral every 6 hours PRN Temp greater or equal to 38C (100.4F), Mild Pain (1 - 3)  aluminum hydroxide/magnesium hydroxide/simethicone Suspension 30 milliLiter(s) Oral every 4 hours PRN Dyspepsia  melatonin 3 milliGRAM(s) Oral at bedtime PRN Insomnia  ondansetron Injectable 4 milliGRAM(s) IV Push every 8 hours PRN Nausea and/or Vomiting   Medications up to date at time of exam.    Medications up to date at time of exam.    FAMILY HISTORY:  No pertinent family history in first degree relatives        SOCIAL HISTORY none smoker   Smoking History: [   ] smoking/smoke exposure, [   ] former smoker  Living Condition: [   ] apartment, [   ] private house  Work History:   Travel History: denies recent travel  Illicit Substance Use: denies  Alcohol Use: denies    REVIEW OF SYSTEMS:    CONSTITUTIONAL:  denies fevers, chills, sweats, weight loss    HEENT:  denies diplopia or blurred vision, sore throat or runny nose.    CARDIOVASCULAR:  denies pressure, squeezing, tightness, or heaviness about the chest; no palpitations.    RESPIRATORY:  denies SOB, cough, BROWN, wheezing.    GASTROINTESTINAL:  denies abdominal pain, nausea, vomiting or diarrhea.    GENITOURINARY: denies dysuria, frequency or urgency.    NEUROLOGIC:  denies numbness, tingling, seizures or weakness.    PSYCHIATRIC:  denies disorder of thought or mood.    MSK: denies swelling, redness      PHYSICAL EXAMINATION:    GENERAL: The patient  in no apparent distress. She is emotional , crying stating she wants clots to be remove from her lung     Vital Signs Last 24 Hrs  T(C): 37.1 (01 Jan 2025 17:26), Max: 37.4 (31 Dec 2024 19:38)  T(F): 98.8 (01 Jan 2025 17:26), Max: 99.3 (31 Dec 2024 19:38)  HR: 97 (01 Jan 2025 17:26) (97 - 122)  BP: 100/65 (01 Jan 2025 17:26) (92/52 - 106/58)  BP(mean): --  RR: 18 (01 Jan 2025 17:26) (18 - 18)  SpO2: 99% (01 Jan 2025 17:26) (94% - 99%)    Parameters below as of 01 Jan 2025 17:26  Patient On (Oxygen Delivery Method): room air       (if applicable)    Chest Tube (if applicable)    HEENT: Head is normocephalic and atraumatic. Extraocular muscles are intact. Mucous membranes are moist.     NECK: Supple, no palpable adenopathy.    LUNGS: Fair b/l breath sounds, no wheezing, rales, or rhonchi.    HEART: Regular rate and rhythm without murmur.    ABDOMEN: Soft, nontender, and nondistended.  No hepatosplenomegaly is noted.    RENAL: No difficulty voiding, no pelvic pain    EXTREMITIES: Without any cyanosis, clubbing, rash, lesions or edema.    NEUROLOGIC: Awake, alert, oriented, grossly intact    SKIN: Warm, dry, good turgor.      LABS:                        7.5    8.49  )-----------( 180      ( 01 Jan 2025 05:43 )             23.9     01-01    135  |  103  |  15  ----------------------------<  132[H]  3.5   |  22  |  0.85    Ca    8.8      01 Jan 2025 05:43  Phos  1.9     01-01  Mg     2.2     01-01    TPro  7.7  /  Alb  2.1[L]  /  TBili  1.2  /  DBili  x   /  AST  21  /  ALT  23  /  AlkPhos  99  12-31    PT/INR - ( 31 Dec 2024 03:59 )   PT: 24.9 sec;   INR: 2.14 ratio         PTT - ( 31 Dec 2024 03:59 )  PTT:42.2 sec  Urinalysis Basic - ( 01 Jan 2025 05:43 )    Color: x / Appearance: x / SG: x / pH: x  Gluc: 132 mg/dL / Ketone: x  / Bili: x / Urobili: x   Blood: x / Protein: x / Nitrite: x   Leuk Esterase: x / RBC: x / WBC x   Sq Epi: x / Non Sq Epi: x / Bacteria: x    MICROBIOLOGY: (if applicable)    RADIOLOGY & ADDITIONAL STUDIES:  EKG:   CXR:  < from: CT Angio Chest PE Protocol w/ IV Cont (12.31.24 @ 08:34) >  INTERPRETATION:  Clinical information: Chest pain. History of metastatic   breast cancer. Evaluate for pulmonary embolus. Exam is compared to   previous study of 12/12/2024.    CT angiogram of the chest was obtained following administration of   intravenous contrast. Approximately 60 cc of Omnipaque 350 was   administered in 40 cc was discarded. Coronal, sagittal and MIP images   were submitted for review.    Once again, a large mass is noted within the left breast. This is   incompletely imaged on this exam. Marked hazy changes within the left   breast as well as thickening of the skin overlying the left breast are   noted. Multiple lymph nodes are present in the left axilla/left   subpectoral region.    No hilar or mediastinal adenopathy is noted.    Heart is normal in size. No pericardial effusion is noted. Once again,   filling defect is noted within the segmentalbranches of the right lower   lobe pulmonary artery. Appearance is unchanged when compared to previous   exam. No new filling defects are noted. Right-sided Mediport catheter is   noted in place.    No endobronchial lesions are noted. Peripherally situated opacity   containing central lucency is noted in the posterior segment of the right   lower lobe. Trace right pleural effusion is noted.    Below the diaphragm, visualized portions of the abdomen are unremarkable.    Visualized osseous structures are within normal limits.    IMPRESSION: No significant change in the pulmonary embolus in the   segmental branch of the right lower lobe pulmonary artery when compared   to previous exam.    Findings suggestive of pulmonary infarct in the posterior segment of the   right lower lobe.    < end of copied text >  ECHO:    IMPRESSION: 39y Female PAST MEDICAL & SURGICAL HISTORY:  Breast cancer      Pulmonary embolism      No significant past surgical history       p/w       IMP:  This is a 39 yr old woman , recently diagnosed with Left breast CA in nov 2024 on chemo. Patient was previously at [facility] in early december for SOB and chest pain found to have bilateral pulm emboli during the stay patient was offered thrombectomy however patient declined and later changed her mind during the stay however providers recommended the patient continue her medical management Eliquis and Rivaroxaban).  Pat complaining of pain pointing left breast mass area , doubt due to PE     ASSESSMENT     - PE.. unchanged   - Breast ca   - Ulcerated beast mass   - Sepsis   - Anemia       Sugg    - Continue therapeutic anticoag   - Continue antibx   - F/u culture  - Pain control .. refuse pain meds   - Onco following   - Wound care   - DVT GI prophy     mother at bedside

## 2025-01-01 NOTE — CONSULT NOTE ADULT - ASSESSMENT
Patient is a 39y F, with locally advanced TNBC is admitted for sepsis due to possible Left breast ulcer.      Locally advanced TNBC  - s/p carboplatin/paclitaxel/pembrolizumab  - treatment on hold given PE and     Pulmonary embolism.   - bilateral PEs, patient refused thrombectomy  - continue on eliquis     Sepsis  - s/p IV antibiotics, IVF  - f/u blood cultures, urine cultures  - s/p incision and drainage with surgery    Anemia  - Hb 7.5  - check iron panel and B12/folate    Will follow      Patient is a 39y F, with locally advanced TNBC is admitted for sepsis due to possible Left breast ulcer.      Locally advanced TNBC  - s/p carboplatin/paclitaxel/pembrolizumab one dose  - treatment on hold given PE and pt not following with Dr Quezada  - advised patient need to closely follow up with oncology    Pulmonary embolism.   - bilateral PEs, patient refused thrombectomy in the past  - continue on Eliquis  - cardiology and pulmonary consult     Sepsis  - s/p IV antibiotics, IVF  - f/u blood cultures, urine cultures  - s/p incision and drainage left breast site with surgery    Anemia  - Hb 7.5  - check iron panel and B12/folate    Will follow

## 2025-01-02 DIAGNOSIS — D64.9 ANEMIA, UNSPECIFIED: ICD-10-CM

## 2025-01-02 LAB
-  AMOXICILLIN/CLAVULANIC ACID: SIGNIFICANT CHANGE UP
-  AMPICILLIN/SULBACTAM: SIGNIFICANT CHANGE UP
-  AMPICILLIN: SIGNIFICANT CHANGE UP
-  AZTREONAM: SIGNIFICANT CHANGE UP
-  CEFAZOLIN: SIGNIFICANT CHANGE UP
-  CEFEPIME: SIGNIFICANT CHANGE UP
-  CEFOXITIN: SIGNIFICANT CHANGE UP
-  CEFTRIAXONE: SIGNIFICANT CHANGE UP
-  CIPROFLOXACIN: SIGNIFICANT CHANGE UP
-  CLINDAMYCIN: SIGNIFICANT CHANGE UP
-  ERTAPENEM: SIGNIFICANT CHANGE UP
-  ERYTHROMYCIN: SIGNIFICANT CHANGE UP
-  GENTAMICIN: SIGNIFICANT CHANGE UP
-  GENTAMICIN: SIGNIFICANT CHANGE UP
-  LEVOFLOXACIN: SIGNIFICANT CHANGE UP
-  MEROPENEM: SIGNIFICANT CHANGE UP
-  OXACILLIN: SIGNIFICANT CHANGE UP
-  PENICILLIN: SIGNIFICANT CHANGE UP
-  PIPERACILLIN/TAZOBACTAM: SIGNIFICANT CHANGE UP
-  RIFAMPIN: SIGNIFICANT CHANGE UP
-  TETRACYCLINE: SIGNIFICANT CHANGE UP
-  TOBRAMYCIN: SIGNIFICANT CHANGE UP
-  TRIMETHOPRIM/SULFAMETHOXAZOLE: SIGNIFICANT CHANGE UP
-  TRIMETHOPRIM/SULFAMETHOXAZOLE: SIGNIFICANT CHANGE UP
-  VANCOMYCIN: SIGNIFICANT CHANGE UP
ALBUMIN SERPL ELPH-MCNC: 1.6 G/DL — LOW (ref 3.5–5)
ALP SERPL-CCNC: 85 U/L — SIGNIFICANT CHANGE UP (ref 40–120)
ALT FLD-CCNC: 20 U/L DA — SIGNIFICANT CHANGE UP (ref 10–60)
ANION GAP SERPL CALC-SCNC: 9 MMOL/L — SIGNIFICANT CHANGE UP (ref 5–17)
ANISOCYTOSIS BLD QL: SLIGHT — SIGNIFICANT CHANGE UP
AST SERPL-CCNC: 16 U/L — SIGNIFICANT CHANGE UP (ref 10–40)
BASOPHILS # BLD AUTO: 0.01 K/UL — SIGNIFICANT CHANGE UP (ref 0–0.2)
BASOPHILS NFR BLD AUTO: 0.2 % — SIGNIFICANT CHANGE UP (ref 0–2)
BILIRUB SERPL-MCNC: 0.4 MG/DL — SIGNIFICANT CHANGE UP (ref 0.2–1.2)
BLD GP AB SCN SERPL QL: SIGNIFICANT CHANGE UP
BUN SERPL-MCNC: 14 MG/DL — SIGNIFICANT CHANGE UP (ref 7–18)
CALCIUM SERPL-MCNC: 8.7 MG/DL — SIGNIFICANT CHANGE UP (ref 8.4–10.5)
CHLORIDE SERPL-SCNC: 105 MMOL/L — SIGNIFICANT CHANGE UP (ref 96–108)
CO2 SERPL-SCNC: 23 MMOL/L — SIGNIFICANT CHANGE UP (ref 22–31)
CREAT SERPL-MCNC: 0.71 MG/DL — SIGNIFICANT CHANGE UP (ref 0.5–1.3)
EGFR: 110 ML/MIN/1.73M2 — SIGNIFICANT CHANGE UP
EOSINOPHIL # BLD AUTO: 0.18 K/UL — SIGNIFICANT CHANGE UP (ref 0–0.5)
EOSINOPHIL NFR BLD AUTO: 3.9 % — SIGNIFICANT CHANGE UP (ref 0–6)
GLUCOSE SERPL-MCNC: 128 MG/DL — HIGH (ref 70–99)
HCT VFR BLD CALC: 20.4 % — CRITICAL LOW (ref 34.5–45)
HGB BLD-MCNC: 6.5 G/DL — CRITICAL LOW (ref 11.5–15.5)
IMM GRANULOCYTES NFR BLD AUTO: 0.4 % — SIGNIFICANT CHANGE UP (ref 0–0.9)
LYMPHOCYTES # BLD AUTO: 0.91 K/UL — LOW (ref 1–3.3)
LYMPHOCYTES # BLD AUTO: 19.7 % — SIGNIFICANT CHANGE UP (ref 13–44)
MAGNESIUM SERPL-MCNC: 2.1 MG/DL — SIGNIFICANT CHANGE UP (ref 1.6–2.6)
MANUAL SMEAR VERIFICATION: SIGNIFICANT CHANGE UP
MCHC RBC-ENTMCNC: 24.8 PG — LOW (ref 27–34)
MCHC RBC-ENTMCNC: 31.9 G/DL — LOW (ref 32–36)
MCV RBC AUTO: 77.9 FL — LOW (ref 80–100)
METHOD TYPE: SIGNIFICANT CHANGE UP
METHOD TYPE: SIGNIFICANT CHANGE UP
MICROCYTES BLD QL: SLIGHT — SIGNIFICANT CHANGE UP
MONOCYTES # BLD AUTO: 0.26 K/UL — SIGNIFICANT CHANGE UP (ref 0–0.9)
MONOCYTES NFR BLD AUTO: 5.6 % — SIGNIFICANT CHANGE UP (ref 2–14)
NEUTROPHILS # BLD AUTO: 3.23 K/UL — SIGNIFICANT CHANGE UP (ref 1.8–7.4)
NEUTROPHILS NFR BLD AUTO: 70.2 % — SIGNIFICANT CHANGE UP (ref 43–77)
NRBC # BLD: 0 /100 WBCS — SIGNIFICANT CHANGE UP (ref 0–0)
PHOSPHATE SERPL-MCNC: 2.6 MG/DL — SIGNIFICANT CHANGE UP (ref 2.5–4.5)
PLAT MORPH BLD: NORMAL — SIGNIFICANT CHANGE UP
PLATELET # BLD AUTO: 188 K/UL — SIGNIFICANT CHANGE UP (ref 150–400)
PLATELET COUNT - ESTIMATE: NORMAL — SIGNIFICANT CHANGE UP
POTASSIUM SERPL-MCNC: 3.6 MMOL/L — SIGNIFICANT CHANGE UP (ref 3.5–5.3)
POTASSIUM SERPL-SCNC: 3.6 MMOL/L — SIGNIFICANT CHANGE UP (ref 3.5–5.3)
PROT SERPL-MCNC: 6.3 G/DL — SIGNIFICANT CHANGE UP (ref 6–8.3)
RBC # BLD: 2.62 M/UL — LOW (ref 3.8–5.2)
RBC # FLD: 22.7 % — HIGH (ref 10.3–14.5)
RBC BLD AUTO: ABNORMAL
ROULEAUX BLD QL SMEAR: PRESENT
SODIUM SERPL-SCNC: 137 MMOL/L — SIGNIFICANT CHANGE UP (ref 135–145)
WBC # BLD: 4.61 K/UL — SIGNIFICANT CHANGE UP (ref 3.8–10.5)
WBC # FLD AUTO: 4.61 K/UL — SIGNIFICANT CHANGE UP (ref 3.8–10.5)

## 2025-01-02 RX ORDER — FERROUS SULFATE 325(65) MG
325 TABLET ORAL DAILY
Refills: 0 | Status: DISCONTINUED | OUTPATIENT
Start: 2025-01-02 | End: 2025-01-05

## 2025-01-02 RX ORDER — ACETAMINOPHEN 80 MG/.8ML
1000 SOLUTION/ DROPS ORAL ONCE
Refills: 0 | Status: DISCONTINUED | OUTPATIENT
Start: 2025-01-02 | End: 2025-01-05

## 2025-01-02 RX ADMIN — PIPERACILLIN AND TAZOBACTAM 25 GRAM(S): 3; .375 INJECTION, POWDER, LYOPHILIZED, FOR SOLUTION INTRAVENOUS at 08:33

## 2025-01-02 RX ADMIN — PIPERACILLIN AND TAZOBACTAM 25 GRAM(S): 3; .375 INJECTION, POWDER, LYOPHILIZED, FOR SOLUTION INTRAVENOUS at 22:05

## 2025-01-02 RX ADMIN — Medication 200 MILLIGRAM(S): at 00:11

## 2025-01-02 RX ADMIN — PIPERACILLIN AND TAZOBACTAM 3.38 GRAM(S): 3; .375 INJECTION, POWDER, LYOPHILIZED, FOR SOLUTION INTRAVENOUS at 04:30

## 2025-01-02 RX ADMIN — Medication 325 MILLIGRAM(S): at 17:28

## 2025-01-02 RX ADMIN — APIXABAN 5 MILLIGRAM(S): 5 TABLET, FILM COATED ORAL at 05:32

## 2025-01-02 RX ADMIN — Medication 200 MILLIGRAM(S): at 05:33

## 2025-01-02 RX ADMIN — Medication 200 MILLIGRAM(S): at 17:28

## 2025-01-02 RX ADMIN — Medication 200 MILLIGRAM(S): at 23:44

## 2025-01-02 RX ADMIN — APIXABAN 5 MILLIGRAM(S): 5 TABLET, FILM COATED ORAL at 17:28

## 2025-01-02 RX ADMIN — Medication 1 MILLIGRAM(S): at 12:57

## 2025-01-02 RX ADMIN — PIPERACILLIN AND TAZOBACTAM 25 GRAM(S): 3; .375 INJECTION, POWDER, LYOPHILIZED, FOR SOLUTION INTRAVENOUS at 16:56

## 2025-01-02 RX ADMIN — Medication 200 MILLIGRAM(S): at 12:57

## 2025-01-02 RX ADMIN — PIPERACILLIN AND TAZOBACTAM 25 GRAM(S): 3; .375 INJECTION, POWDER, LYOPHILIZED, FOR SOLUTION INTRAVENOUS at 00:10

## 2025-01-02 NOTE — PROVIDER CONTACT NOTE (CRITICAL VALUE NOTIFICATION) - TEST AND RESULT REPORTED:
hemoglobin 6.5; Hematocrit 20.4
Abscess culture- collected on 12/31/2024. Gram negative rods and gram positive cocci in clusters.

## 2025-01-02 NOTE — PROGRESS NOTE ADULT - SUBJECTIVE AND OBJECTIVE BOX
Time of Visit:  Patient seen and examined.     MEDICATIONS  (STANDING):  apixaban 5 milliGRAM(s) Oral every 12 hours  ferrous    sulfate 325 milliGRAM(s) Oral daily  folic acid 1 milliGRAM(s) Oral daily  guaiFENesin Oral Liquid (Sugar-Free) 200 milliGRAM(s) Oral every 6 hours  piperacillin/tazobactam IVPB.. 3.375 Gram(s) IV Intermittent every 8 hours      MEDICATIONS  (PRN):  acetaminophen     Tablet .. 650 milliGRAM(s) Oral every 6 hours PRN Temp greater or equal to 38C (100.4F), Mild Pain (1 - 3)  acetaminophen   IVPB .. 1000 milliGRAM(s) IV Intermittent once PRN Temp greater or equal to 38C (100.4F), Mild Pain (1 - 3)  aluminum hydroxide/magnesium hydroxide/simethicone Suspension 30 milliLiter(s) Oral every 4 hours PRN Dyspepsia  melatonin 3 milliGRAM(s) Oral at bedtime PRN Insomnia  ondansetron Injectable 4 milliGRAM(s) IV Push every 8 hours PRN Nausea and/or Vomiting       Medications up to date at time of exam.      PHYSICAL EXAMINATION:    Vital Signs Last 24 Hrs  T(C): 36.6 (02 Jan 2025 13:35), Max: 37.2 (02 Jan 2025 11:16)  T(F): 97.9 (02 Jan 2025 13:35), Max: 99 (02 Jan 2025 11:16)  HR: 99 (02 Jan 2025 13:35) (97 - 116)  BP: 101/59 (02 Jan 2025 13:35) (100/59 - 116/65)  BP(mean): 68 (02 Jan 2025 11:16) (68 - 68)  RR: 18 (02 Jan 2025 13:35) (18 - 20)  SpO2: 99% (02 Jan 2025 13:35) (98% - 100%)    Parameters below as of 02 Jan 2025 13:35  Patient On (Oxygen Delivery Method): room air       (if applicable)    General: Alert and oriented. Able to answer question with no SOB. No acute distress.     HEENT: Head is normocephalic and atraumatic. Extraocular muscles are intact. Mucous membranes are moist.     NECK: Supple, no palpable adenopathy.    LUNGS: Clear to auscultation bilaterally with no wheezing, rales, or rhonchi. No use of accessory muscle.     HEART: S1 S2 Regular rate and rhythm .    ABDOMEN: Soft, nontender, and nondistended. Active bowel sounds.     EXTREMITIES: Without any cyanosis, clubbing, rash, lesions .    NEUROLOGIC: Awake, alert, oriented.     SKIN: Warm, dry, good turgor.      LABS:                        6.5    4.61  )-----------( 188      ( 02 Jan 2025 05:58 )             20.4     01-02    137  |  105  |  14  ----------------------------<  128[H]  3.6   |  23  |  0.71    Ca    8.7      02 Jan 2025 05:58  Phos  2.6     01-02  Mg     2.1     01-02    TPro  6.3  /  Alb  1.6[L]  /  TBili  0.4  /  DBili  x   /  AST  16  /  ALT  20  /  AlkPhos  85  01-02      Urinalysis Basic - ( 02 Jan 2025 05:58 )    Color: x / Appearance: x / SG: x / pH: x  Gluc: 128 mg/dL / Ketone: x  / Bili: x / Urobili: x   Blood: x / Protein: x / Nitrite: x   Leuk Esterase: x / RBC: x / WBC x   Sq Epi: x / Non Sq Epi: x / Bacteria: x    MICROBIOLOGY: (if applicable)    RADIOLOGY & ADDITIONAL STUDIES:  EKG:   CXR: < from: Xray Chest 1 View- PORTABLE-Urgent (Xray Chest 1 View- PORTABLE-Urgent .) (12.14.24 @ 11:31) >    ACC: [id number] EXAM:  XR CHEST PORTABLE URGENT 1V   ORDERED BY: PHILIPP VAZQUEZ     PROCEDURE DATE:  12/14/2024          INTERPRETATION:  CLINICAL INDICATION: Fever, COVID    EXAM: Frontal radiograph of the chest.    COMPARISON: None    FINDINGS:  Right chest port with tip in the SVC.  Hazy opacities bilateral lung bases.  There is no pleural effusion or pneumothorax.  The heart size is not well evaluated in this position  No acute osseous abnormality.    IMPRESSION:  Hazy opacities bilateral lung bases may represent atelectasis. Infection   is not excluded.    --- End of Report ---          SHERICE ARANDA MD; Resident Radiologist  This document has been electronically signed.  ROSIE WAGONER MD; Attending Radiologist  This document has been electronically signed. Dec 14 2024 12:27PM    < end of copied text >       < from: CT Angio Chest PE Protocol w/ IV Cont (12.31.24 @ 08:34) >    ACC: [id number] EXAM:  CT ANGIO CHEST PULM ART [facility]   ORDERED BY: LANI TURNER     PROCEDURE DATE:  12/31/2024          INTERPRETATION:  Clinical information: Chest pain. History of metastatic   breast cancer. Evaluate for pulmonary embolus. Exam is compared to   previous study of 12/12/2024.    CT angiogram of the chest was obtained following administration of   intravenous contrast. Approximately 60 cc of Omnipaque 350 was   administered in 40 cc was discarded. Coronal, sagittal and MIP images   were submitted for review.    Once again, a large mass is noted within the left breast. This is   incompletely imaged on this exam. Marked hazy changes within the left   breast as well as thickening of the skin overlying the left breast are   noted. Multiple lymph nodes are present in the left axilla/left   subpectoral region.    No hilar or mediastinal adenopathy is noted.    Heart is normal in size. No pericardial effusion is noted. Once again,   filling defect is noted within the segmentalbranches of the right lower   lobe pulmonary artery. Appearance is unchanged when compared to previous   exam. No new filling defects are noted. Right-sided Mediport catheter is   noted in place.    No endobronchial lesions are noted. Peripherally situated opacity   containing central lucency is noted in the posterior segment of the right   lower lobe. Trace right pleural effusion is noted.    Below the diaphragm, visualized portions of the abdomen are unremarkable.    Visualized osseous structures are within normal limits.    IMPRESSION: No significant change in the pulmonary embolus in the   segmental branch of the right lower lobe pulmonary artery when compared   to previous exam.    Findings suggestive of pulmonary infarct in the posterior segment of the   right lower lobe.    --- End of Report ---            KIP BOYLE MD; Attending Radiologist  This document has been electronically signed. Dec 31 2024  8:42AM    < end of copied text >    ECHO:    IMPRESSION: 39y Female PAST MEDICAL & SURGICAL HISTORY:  Breast cancer      Pulmonary embolism      No significant past surgical history       Impression : This is a 38 Y/O Female who recently diagnosed with Left breast CA in nov 2024 on chemo. Patient was previously at [facility] in early december for SOB and chest pain found to have bilateral pulmonary emboli during the stay patient was offered thrombectomy however patient declined and later changed her mind during the stay however providers recommended the patient continue her medical management Eliquis ). for Pulmonary follow up of  B/L Pulmonary Embolism. Admitted for Sepsis with Left Breast Ca wound Culture +ve for Staph Aures and Proteus .         Suggestion:  O2 saturation 96% room air. So far saturating good room air.   Reinforced the importance of Daily compliance to Apixaban .   On Apixaban 5 mg Oral Q 12 Hours.  Oncology following.   On Zosyn 3.375 Gm IVPB Q 8 Hours .

## 2025-01-02 NOTE — PROGRESS NOTE ADULT - ASSESSMENT
40 y/o F, recently diagnosed with Left breast CA in nov 2024 on chemo. Patient was previously at [facility] in early december for SOB and chest pain found to have bilateral pulm emboli during the stay patient was offered thrombectomy however patient declined, discharged on Eliquis   Patient follows Dr. Quezada  and received her first session of chemotherapy on December 5th   Presented with fever, left breast wound   Admmitted for sepsis likely due to infected left breast wound   Underwent bedside I&D by surgery   Started on zosyn   ID consulted   Continued with Eliquis, pulm and cardiology follow   Pt noted with Hg 6.5, no signs of bleeding. Given one unit of PRBC

## 2025-01-02 NOTE — PROGRESS NOTE ADULT - SUBJECTIVE AND OBJECTIVE BOX
Patient is a 39y old  Female who presents with a chief complaint of SOB     INTERVAL HPI/OVERNIGHT EVENTS: no new complaints    MEDICATIONS  (STANDING):  apixaban 5 milliGRAM(s) Oral every 12 hours  folic acid 1 milliGRAM(s) Oral daily  guaiFENesin Oral Liquid (Sugar-Free) 200 milliGRAM(s) Oral every 6 hours  piperacillin/tazobactam IVPB.. 3.375 Gram(s) IV Intermittent every 8 hours    MEDICATIONS  (PRN):  acetaminophen     Tablet .. 650 milliGRAM(s) Oral every 6 hours PRN Temp greater or equal to 38C (100.4F), Mild Pain (1 - 3)  acetaminophen   IVPB .. 1000 milliGRAM(s) IV Intermittent once PRN Temp greater or equal to 38C (100.4F), Mild Pain (1 - 3)  aluminum hydroxide/magnesium hydroxide/simethicone Suspension 30 milliLiter(s) Oral every 4 hours PRN Dyspepsia  melatonin 3 milliGRAM(s) Oral at bedtime PRN Insomnia  ondansetron Injectable 4 milliGRAM(s) IV Push every 8 hours PRN Nausea and/or Vomiting      __________________________________________________  REVIEW OF SYSTEMS:    CONSTITUTIONAL: No fever,   RESPIRATORY: No cough; +intermittetn  shortness of breath  CARDIOVASCULAR: + pleuritic chest pain, no palpitations  GASTROINTESTINAL: No pain. No nausea or vomiting; No diarrhea   NEUROLOGICAL: No headache or numbness, no tremors  MUSCULOSKELETAL: No joint pain, no muscle pain  GENITOURINARY: no dysuria, no frequency, no hesitancy        Vital Signs Last 24 Hrs  T(C): 37.2 (02 Jan 2025 11:16), Max: 37.2 (02 Jan 2025 11:16)  T(F): 99 (02 Jan 2025 11:16), Max: 99 (02 Jan 2025 11:16)  HR: 110 (02 Jan 2025 11:16) (97 - 116)  BP: 101/55 (02 Jan 2025 11:16) (100/59 - 116/65)  BP(mean): 68 (02 Jan 2025 11:16) (68 - 68)  RR: 18 (02 Jan 2025 11:16) (18 - 20)  SpO2: 99% (02 Jan 2025 11:16) (98% - 100%)    Parameters below as of 02 Jan 2025 11:16  Patient On (Oxygen Delivery Method): room air        ________________________________________________  PHYSICAL EXAM:  GENERAL: NAD, obese   CHEST/LUNG: dec breath sounds at bases  HEART: S1 S2 +  ABDOMEN: Soft, Nontender, Nondistended; Bowel sounds present  EXTREMITIES: no cyanosis; no edema; no calf tenderness  SKIN: left breast inferior and lateral wounds, + dressings intact   NERVOUS SYSTEM:  Awake and alert;      LABS:                        6.5    4.61  )-----------( 188      ( 02 Jan 2025 05:58 )             20.4     01-02    137  |  105  |  14  ----------------------------<  128[H]  3.6   |  23  |  0.71    Ca    8.7      02 Jan 2025 05:58  Phos  2.6     01-02  Mg     2.1     01-02    TPro  6.3  /  Alb  1.6[L]  /  TBili  0.4  /  DBili  x   /  AST  16  /  ALT  20  /  AlkPhos  85  01-02      Urinalysis Basic - ( 02 Jan 2025 05:58 )    Color: x / Appearance: x / SG: x / pH: x  Gluc: 128 mg/dL / Ketone: x  / Bili: x / Urobili: x   Blood: x / Protein: x / Nitrite: x   Leuk Esterase: x / RBC: x / WBC x   Sq Epi: x / Non Sq Epi: x / Bacteria: x      CAPILLARY BLOOD GLUCOSE            RADIOLOGY & ADDITIONAL TESTS:    Imaging Personally Reviewed:  YES/NO    Consultant(s) Notes Reviewed:   YES/ No    Care Discussed with Consultants :     Plan of care was discussed with patient and /or primary care giver; all questions and concerns were addressed and care was aligned with patient's wishes.

## 2025-01-02 NOTE — PROGRESS NOTE ADULT - SUBJECTIVE AND OBJECTIVE BOX
NP Note discussed with  primary attending    Patient is a 39y old  Female who presents with a chief complaint of SOB (01 Jan 2025 18:16)      INTERVAL HPI/OVERNIGHT EVENTS: no new complaints    MEDICATIONS  (STANDING):  apixaban 5 milliGRAM(s) Oral every 12 hours  folic acid 1 milliGRAM(s) Oral daily  guaiFENesin Oral Liquid (Sugar-Free) 200 milliGRAM(s) Oral every 6 hours  piperacillin/tazobactam IVPB.. 3.375 Gram(s) IV Intermittent every 8 hours    MEDICATIONS  (PRN):  acetaminophen     Tablet .. 650 milliGRAM(s) Oral every 6 hours PRN Temp greater or equal to 38C (100.4F), Mild Pain (1 - 3)  acetaminophen   IVPB .. 1000 milliGRAM(s) IV Intermittent once PRN Temp greater or equal to 38C (100.4F), Mild Pain (1 - 3)  aluminum hydroxide/magnesium hydroxide/simethicone Suspension 30 milliLiter(s) Oral every 4 hours PRN Dyspepsia  melatonin 3 milliGRAM(s) Oral at bedtime PRN Insomnia  ondansetron Injectable 4 milliGRAM(s) IV Push every 8 hours PRN Nausea and/or Vomiting      __________________________________________________  REVIEW OF SYSTEMS:    CONSTITUTIONAL: No fever,   RESPIRATORY: No cough; + intermittent  shortness of breath  CARDIOVASCULAR: No chest pain, no palpitations  GASTROINTESTINAL: No pain. No nausea or vomiting; No diarrhea   NEUROLOGICAL: No headache or numbness, no tremors  MUSCULOSKELETAL: No joint pain, no muscle pain  GENITOURINARY: no dysuria, no frequency, no hesitancy  PSYCHIATRY: no depression , no anxiety  ALL OTHER  ROS negative        Vital Signs Last 24 Hrs  T(C): 37.2 (02 Jan 2025 11:16), Max: 37.2 (02 Jan 2025 11:16)  T(F): 99 (02 Jan 2025 11:16), Max: 99 (02 Jan 2025 11:16)  HR: 110 (02 Jan 2025 11:16) (97 - 116)  BP: 101/55 (02 Jan 2025 11:16) (100/59 - 116/65)  BP(mean): 68 (02 Jan 2025 11:16) (68 - 68)  RR: 18 (02 Jan 2025 11:16) (18 - 20)  SpO2: 99% (02 Jan 2025 11:16) (98% - 100%)    Parameters below as of 02 Jan 2025 11:16  Patient On (Oxygen Delivery Method): room air        ________________________________________________  PHYSICAL EXAM:  GENERAL: NAD  HEENT: Normocephalic;  conjunctivae and sclerae clear; moist mucous membranes;   NECK : supple  CHEST/LUNG: Clear to ausculitation bilaterally with good air entry   HEART: S1 S2  regular; no murmurs, gallops or rubs  ABDOMEN: Soft, Nontender, Nondistended; Bowel sounds present  EXTREMITIES: no cyanosis; no edema; no calf tenderness  SKIN: warm and dry; no rash  NERVOUS SYSTEM:  Awake and alert; Oriented  to place, person and time ; no new deficits    _________________________________________________  LABS:                        6.5    4.61  )-----------( 188      ( 02 Jan 2025 05:58 )             20.4     01-02    137  |  105  |  14  ----------------------------<  128[H]  3.6   |  23  |  0.71    Ca    8.7      02 Jan 2025 05:58  Phos  2.6     01-02  Mg     2.1     01-02    TPro  6.3  /  Alb  1.6[L]  /  TBili  0.4  /  DBili  x   /  AST  16  /  ALT  20  /  AlkPhos  85  01-02      Urinalysis Basic - ( 02 Jan 2025 05:58 )    Color: x / Appearance: x / SG: x / pH: x  Gluc: 128 mg/dL / Ketone: x  / Bili: x / Urobili: x   Blood: x / Protein: x / Nitrite: x   Leuk Esterase: x / RBC: x / WBC x   Sq Epi: x / Non Sq Epi: x / Bacteria: x      CAPILLARY BLOOD GLUCOSE            RADIOLOGY & ADDITIONAL TESTS:    Imaging Personally Reviewed:  YES/NO    Consultant(s) Notes Reviewed:   YES/ No    Care Discussed with Consultants :     Plan of care was discussed with patient and /or primary care giver; all questions and concerns were addressed and care was aligned with patient's wishes.

## 2025-01-02 NOTE — PROGRESS NOTE ADULT - NS ATTEND AMEND GEN_ALL_CORE FT
Impression : This is a 38 Y/O Female who recently diagnosed with Left breast CA in nov 2024 on chemo. Patient was previously at [facility] in early december for SOB and chest pain found to have bilateral pulmonary emboli during the stay patient was offered thrombectomy however patient declined and later changed her mind during the stay however providers recommended the patient continue her medical management Eliquis ). for Pulmonary follow up of  B/L Pulmonary Embolism. Admitted for Sepsis with Left Breast Ca wound Culture +ve for Staph Aures and Proteus .         Suggestion:  O2 saturation 96% room air. So far saturating good room air.   Reinforced the importance of Daily compliance to Apixaban .   On Apixaban 5 mg Oral Q 12 Hours.  Oncology following.   On Zosyn 3.375 Gm IVPB Q 8 Hours .

## 2025-01-02 NOTE — PROGRESS NOTE ADULT - ASSESSMENT
Patient is a 39y F, with locally advanced TNBC is admitted for sepsis due to possible Left breast ulcer.      Locally advanced TNBC  - s/p carboplatin/paclitaxel/pembrolizumab one dose  - treatment on hold given PE and pt not following with Dr Quezada  - advised patient need to closely follow up with oncology    Anemia  - Patient hb 6.5  - check FOB  - Needs 1 unit of PRBC transfusion today.  - daily cbc and transfuse for hb<7  - Will need GI to rule out GIB.   - Patient is on eliquis which can exacerbate GIB.  - low folate, can give 1mg folic acid daily  - signs of iron deficiency.  can start oral iron ferrous gluconate once a day.     Pulmonary embolism.   - bilateral PEs, patient refused thrombectomy in the past  - continue on Eliquis  - cardiology and pulmonary consult     Sepsis  - s/p IV antibiotics, IVF  - f/u blood cultures, urine cultures  - s/p incision and drainage left breast site with surgery    Will follow

## 2025-01-02 NOTE — PROGRESS NOTE ADULT - SUBJECTIVE AND OBJECTIVE BOX
Patient is a 39y old  Female who presents with a chief complaint of SOB (01 Jan 2025 18:16)    Subjective: Patient seen at bedside.  Patient is very anxious and worried.  No overnight events.    MEDICATIONS  (STANDING):  apixaban 5 milliGRAM(s) Oral every 12 hours  folic acid 1 milliGRAM(s) Oral daily  guaiFENesin Oral Liquid (Sugar-Free) 200 milliGRAM(s) Oral every 6 hours  piperacillin/tazobactam IVPB.. 3.375 Gram(s) IV Intermittent every 8 hours    MEDICATIONS  (PRN):  acetaminophen     Tablet .. 650 milliGRAM(s) Oral every 6 hours PRN Temp greater or equal to 38C (100.4F), Mild Pain (1 - 3)  acetaminophen   IVPB .. 1000 milliGRAM(s) IV Intermittent once PRN Temp greater or equal to 38C (100.4F), Mild Pain (1 - 3)  aluminum hydroxide/magnesium hydroxide/simethicone Suspension 30 milliLiter(s) Oral every 4 hours PRN Dyspepsia  melatonin 3 milliGRAM(s) Oral at bedtime PRN Insomnia  ondansetron Injectable 4 milliGRAM(s) IV Push every 8 hours PRN Nausea and/or Vomiting      ROS  No fever, sweats, chills  No epistaxis, HA, sore throat  No CP, SOB, cough, sputum  No n/v/d, abd pain, melena, hematochezia  No edema  No rash  No anxiety  No back pain, joint pain  No bleeding, bruising  No dysuria, hematuria    Vital Signs Last 24 Hrs  T(C): 36.7 (02 Jan 2025 08:10), Max: 37.1 (01 Jan 2025 17:26)  T(F): 98.1 (02 Jan 2025 08:10), Max: 98.8 (01 Jan 2025 17:26)  HR: 115 (02 Jan 2025 08:10) (97 - 116)  BP: 113/71 (02 Jan 2025 08:10) (100/59 - 116/65)  BP(mean): --  RR: 18 (02 Jan 2025 08:10) (18 - 20)  SpO2: 100% (02 Jan 2025 08:10) (97% - 100%)    Parameters below as of 02 Jan 2025 08:10  Patient On (Oxygen Delivery Method): room air        PE  NAD  Awake, alert  Anicteric, MMM  RRR  CTAB  Abd soft, NT, ND  No c/c/e  No rash grossly  FROM                          6.5    4.61  )-----------( 188      ( 02 Jan 2025 05:58 )             20.4       01-02    137  |  105  |  14  ----------------------------<  128[H]  3.6   |  23  |  0.71    Ca    8.7      02 Jan 2025 05:58  Phos  2.6     01-02  Mg     2.1     01-02    TPro  6.3  /  Alb  1.6[L]  /  TBili  0.4  /  DBili  x   /  AST  16  /  ALT  20  /  AlkPhos  85  01-02

## 2025-01-02 NOTE — PROGRESS NOTE ADULT - SUBJECTIVE AND OBJECTIVE BOX
PATIENT SEEN AND EXAMINED BY ZAID LORD M.D. ON :- 1/2/25  DATE OF SERVICE:   1/2/25          Interim events noted,Labs ,Radiological studies and Cardiology tests reviewed .    Patient is a 39y old  Female who presents with a chief complaint of Sepsis with Breast wound culture + proteus and staph aureus . (02 Jan 2025 12:22)      HPI:  Patient is a 39yF, recently diagnosed with Left breast CA in nov 2024 on chemo. Patient was previously at [facility] in early december for SOB and chest pain found to have bilateral pulm emboli during the stay patient was offered thrombectomy however patient declined and later changed her mind during the stay however providers recommended the patient continue her medical management Eliquis and Rivaroxaban). Patient follows Dr. Damien STALLINGS and received her first session of chemotherapy on December 5th as per the patient the infusion lasted 6hours and since then she has been having fatigue, sob on exertion, chronic productive cough, nausea and vomiting since the chemotherapy. Patient reports subjective fevers however did not measure the temperature at home. She denies palpitations, chest pain, lightheadedness, syncopal episodes, hemoptysis, hematemesis, hematochezia, melena.  On further questioning patient disclosed she has a biopsy done on her left breast in mid november. The site now appears to have an ulcer. She notes she has chronic discharge serosanguinous occasionally bloody, malodorous.    (31 Dec 2024 12:50)      PAST MEDICAL & SURGICAL HISTORY:  Breast cancer      Pulmonary embolism      No significant past surgical history          PREVIOUS DIAGNOSTIC TESTING:      ECHO  FINDINGS:    STRESS  FINDINGS:    CATHETERIZATION  FINDINGS:    MEDICATIONS  (STANDING):  apixaban 5 milliGRAM(s) Oral every 12 hours  ferrous    sulfate 325 milliGRAM(s) Oral daily  folic acid 1 milliGRAM(s) Oral daily  guaiFENesin Oral Liquid (Sugar-Free) 200 milliGRAM(s) Oral every 6 hours  piperacillin/tazobactam IVPB.. 3.375 Gram(s) IV Intermittent every 8 hours    MEDICATIONS  (PRN):  acetaminophen     Tablet .. 650 milliGRAM(s) Oral every 6 hours PRN Temp greater or equal to 38C (100.4F), Mild Pain (1 - 3)  acetaminophen   IVPB .. 1000 milliGRAM(s) IV Intermittent once PRN Temp greater or equal to 38C (100.4F), Mild Pain (1 - 3)  aluminum hydroxide/magnesium hydroxide/simethicone Suspension 30 milliLiter(s) Oral every 4 hours PRN Dyspepsia  melatonin 3 milliGRAM(s) Oral at bedtime PRN Insomnia  ondansetron Injectable 4 milliGRAM(s) IV Push every 8 hours PRN Nausea and/or Vomiting      FAMILY HISTORY:  No pertinent family history in first degree relatives        SOCIAL HISTORY:    CIGARETTES:    ALCOHOL:    REVIEW OF SYSTEMS:  CONSTITUTIONAL: No fever, weight loss, or fatigue  EYES: No eye pain, visual disturbances, or discharge  ENMT:  No difficulty hearing, tinnitus, vertigo; No sinus or throat pain  NECK: No pain or stiffness  RESPIRATORY: No cough, wheezing, chills or hemoptysis; No shortness of breath  CARDIOVASCULAR: No chest pain, palpitations, dizziness, or leg swelling  GASTROINTESTINAL: No abdominal or epigastric pain. No nausea, vomiting, or hematemesis; No diarrhea or constipation. No melena or hematochezia.  GENITOURINARY: No dysuria, frequency, hematuria, or incontinence  NEUROLOGICAL: No headaches, memory loss, loss of strength, numbness, or tremors  SKIN: No itching, burning, rashes, or lesions   LYMPH NODES: No enlarged glands  ENDOCRINE: No heat or cold intolerance; No hair loss  MUSCULOSKELETAL: No joint pain or swelling; No muscle, back, or extremity pain  PSYCHIATRIC: No depression, anxiety, mood swings, or difficulty sleeping  HEME/LYMPH: No easy bruising, or bleeding gums  ALLERY AND IMMUNOLOGIC: No hives or eczema    Vital Signs Last 24 Hrs  T(C): 36.5 (02 Jan 2025 20:40), Max: 37.2 (02 Jan 2025 11:16)  T(F): 97.7 (02 Jan 2025 20:40), Max: 99 (02 Jan 2025 11:16)  HR: 87 (02 Jan 2025 20:40) (87 - 116)  BP: 94/74 (02 Jan 2025 20:40) (94/74 - 116/65)  BP(mean): 68 (02 Jan 2025 11:16) (68 - 68)  RR: 18 (02 Jan 2025 20:40) (18 - 20)  SpO2: 99% (02 Jan 2025 20:40) (97% - 100%)    Parameters below as of 02 Jan 2025 20:40  Patient On (Oxygen Delivery Method): room air          PHYSICAL EXAM:  GENERAL: NAD, well-groomed, well-developed  HEAD:  Atraumatic, Normocephalic  EYES: EOMI, PERRLA, conjunctiva and sclera clear  ENMT: No tonsillar erythema, exudates, or enlargement; Moist mucous membranes, Good dentition, No lesions  NECK: Supple, No JVD, Normal thyroid  NERVOUS SYSTEM:  Alert & Oriented X3, Good concentration; Motor Strength 5/5 B/L upper and lower extremities; DTRs 2+ intact and symmetric  CHEST/LUNG: Clear to percussion bilaterally; No rales, rhonchi, wheezing, or rubs  HEART: Regular rate and rhythm; No murmurs, rubs, or gallops  ABDOMEN: Soft, Nontender, Nondistended; Bowel sounds present  EXTREMITIES:  2+ Peripheral Pulses, No clubbing, cyanosis, or edema  LYMPH: No lymphadenopathy noted  SKIN: No rashes or lesions      INTERPRETATION OF TELEMETRY:    ECG:    CHADSVASC:     LABS:                        6.5    4.61  )-----------( 188      ( 02 Jan 2025 05:58 )             20.4     01-02    137  |  105  |  14  ----------------------------<  128[H]  3.6   |  23  |  0.71    Ca    8.7      02 Jan 2025 05:58  Phos  2.6     01-02  Mg     2.1     01-02    TPro  6.3  /  Alb  1.6[L]  /  TBili  0.4  /  DBili  x   /  AST  16  /  ALT  20  /  AlkPhos  85  01-02          Urinalysis Basic - ( 02 Jan 2025 05:58 )    Color: x / Appearance: x / SG: x / pH: x  Gluc: 128 mg/dL / Ketone: x  / Bili: x / Urobili: x   Blood: x / Protein: x / Nitrite: x   Leuk Esterase: x / RBC: x / WBC x   Sq Epi: x / Non Sq Epi: x / Bacteria: x      Lipid Panel:   I&O's Summary      RADIOLOGY & ADDITIONAL STUDIES:    < from: TTE W or WO Ultrasound Enhancing Agent (12.12.24 @ 18:31) >  CONCLUSIONS:      1. Technically difficulty study.   2. Left ventricular endocardium is not well visualized; however, the left ventricular systolic function appears grossly normal.   3. There is poor visualization of all the endocardial borders to determine the presence of wall motion abnormalities. Probably normal.   4. The right ventricle is not well visualized. Based on visual assessment, the right ventricle appears normal in size and right ventricular systolic function is normal.   5. Unable to exclude the presence of right atrium, superior or inferior vena cava mass.   6. No significant valvular disease within study limitations.   7. No prior echocardiogram is available for comparison.    < end of copied text >

## 2025-01-02 NOTE — PROGRESS NOTE ADULT - ASSESSMENT
Patient is a 39yF, recently diagnosed with Left breast CA in nov 2024 on chemo. Recently in [facility] for bilateral PE refused thrombectomy initially, later agreeable however providers recommended medical management with eliquis. Patient presented to the ED with continued cough and sob on exertion which started when the PE was discovered. In the ED febrile Tmax 101, tachycardic. On exam patient has a left breast ulcer which has been present since the biopsy was performed in november. Patient is being admitted for Sepsis 2/2 Left breast ulcer and management of PE.     # Sepsis.   ·  Plan: p/w fever, HR >90, tachypneic, lactate wnl  s/p biopsy of left breast in mid november 2024, since there is chronic drainage and open wound.   serosanguinous, malodorous ulcer   surgery f/u   ID eval  f/u cx.    #  Ulcer of skin of breast.   ·  Plan: ulcer present on left lateral lower quadrant of breast since mid november post biopsy  since then ulcer present and chronically draining: malodorous serosangionous bloody discharge daily  Left breast enlarged and appears to have a peu d'orange appearance compared to right breast  surgery consulted for possible intervention.  #  Breast cancer.   ·  Plan: recently diagnosed with left  breast cancer in Nov 2024.   underwent 1 session of chemotherapy on Dec 5th as per patient it was 6 hour infusion after which she developed lethargy, nausea and vomiting  follows Dr. Quezada at [facility]  heme onc consult reviewed.    #  Pulmonary embolism.   ·  Plan: At [facility] diagnosed with bilateral PEs, patient refused thrombectomy initially hence started on medical management for PE.  cont ac   pulm eval.

## 2025-01-02 NOTE — PROGRESS NOTE ADULT - ASSESSMENT
38 y/o F, recently diagnosed with Left breast CA in nov 2024 on chemo. Patient was previously at [facility] in early december for SOB and chest pain found to have bilateral pulm emboli during the stay patient was offered thrombectomy however patient declined, discharged on Eliquis   Patient follows Dr. Quezada  and received her first session of chemotherapy on December 5th   Presented with fever, left breast wound   Admmitted for sepsis likely due to infected left breast wound   Underwent bedside I&D by surgery   Started on zosyn   ID consulted   Continued with Eliquis, pulm and cardiology follow   Pt noted with Hg 6.5, no signs of bleeding. Given one unit of PRBC

## 2025-01-02 NOTE — PROGRESS NOTE ADULT - SUBJECTIVE AND OBJECTIVE BOX
NP Note discussed with  primary attending    Patient is a 39y old  Female who presents with a chief complaint of SOB (01 Jan 2025 18:16)      INTERVAL HPI/OVERNIGHT EVENTS: no new complaints    MEDICATIONS  (STANDING):  apixaban 5 milliGRAM(s) Oral every 12 hours  folic acid 1 milliGRAM(s) Oral daily  guaiFENesin Oral Liquid (Sugar-Free) 200 milliGRAM(s) Oral every 6 hours  piperacillin/tazobactam IVPB.. 3.375 Gram(s) IV Intermittent every 8 hours    MEDICATIONS  (PRN):  acetaminophen     Tablet .. 650 milliGRAM(s) Oral every 6 hours PRN Temp greater or equal to 38C (100.4F), Mild Pain (1 - 3)  acetaminophen   IVPB .. 1000 milliGRAM(s) IV Intermittent once PRN Temp greater or equal to 38C (100.4F), Mild Pain (1 - 3)  aluminum hydroxide/magnesium hydroxide/simethicone Suspension 30 milliLiter(s) Oral every 4 hours PRN Dyspepsia  melatonin 3 milliGRAM(s) Oral at bedtime PRN Insomnia  ondansetron Injectable 4 milliGRAM(s) IV Push every 8 hours PRN Nausea and/or Vomiting      __________________________________________________  REVIEW OF SYSTEMS:    CONSTITUTIONAL: No fever,   RESPIRATORY: No cough; +intermittetn  shortness of breath  CARDIOVASCULAR: + pleuritic chest pain, no palpitations  GASTROINTESTINAL: No pain. No nausea or vomiting; No diarrhea   NEUROLOGICAL: No headache or numbness, no tremors  MUSCULOSKELETAL: No joint pain, no muscle pain  GENITOURINARY: no dysuria, no frequency, no hesitancy        Vital Signs Last 24 Hrs  T(C): 37.2 (02 Jan 2025 11:16), Max: 37.2 (02 Jan 2025 11:16)  T(F): 99 (02 Jan 2025 11:16), Max: 99 (02 Jan 2025 11:16)  HR: 110 (02 Jan 2025 11:16) (97 - 116)  BP: 101/55 (02 Jan 2025 11:16) (100/59 - 116/65)  BP(mean): 68 (02 Jan 2025 11:16) (68 - 68)  RR: 18 (02 Jan 2025 11:16) (18 - 20)  SpO2: 99% (02 Jan 2025 11:16) (98% - 100%)    Parameters below as of 02 Jan 2025 11:16  Patient On (Oxygen Delivery Method): room air        ________________________________________________  PHYSICAL EXAM:  GENERAL: NAD, obese   CHEST/LUNG: Clear to ausculitation bilaterally   HEART: S1 S2  regular;   ABDOMEN: Soft, Nontender, Nondistended; Bowel sounds present  EXTREMITIES: no cyanosis; no edema; no calf tenderness  SKIN: left breast inferior and lateral wounds, + dressings intact   NERVOUS SYSTEM:  Awake and alert; Oriented  to place, person and time ; no new deficits    _________________________________________________  LABS:                        6.5    4.61  )-----------( 188      ( 02 Jan 2025 05:58 )             20.4     01-02    137  |  105  |  14  ----------------------------<  128[H]  3.6   |  23  |  0.71    Ca    8.7      02 Jan 2025 05:58  Phos  2.6     01-02  Mg     2.1     01-02    TPro  6.3  /  Alb  1.6[L]  /  TBili  0.4  /  DBili  x   /  AST  16  /  ALT  20  /  AlkPhos  85  01-02      Urinalysis Basic - ( 02 Jan 2025 05:58 )    Color: x / Appearance: x / SG: x / pH: x  Gluc: 128 mg/dL / Ketone: x  / Bili: x / Urobili: x   Blood: x / Protein: x / Nitrite: x   Leuk Esterase: x / RBC: x / WBC x   Sq Epi: x / Non Sq Epi: x / Bacteria: x      CAPILLARY BLOOD GLUCOSE            RADIOLOGY & ADDITIONAL TESTS:    Imaging Personally Reviewed:  YES/NO    Consultant(s) Notes Reviewed:   YES/ No    Care Discussed with Consultants :     Plan of care was discussed with patient and /or primary care giver; all questions and concerns were addressed and care was aligned with patient's wishes.

## 2025-01-02 NOTE — PROGRESS NOTE ADULT - ASSESSMENT
39y.o. Female s/p bedside I&D L breast, inflammatory breast ca, PE. tachy     -con't daily packing of inferior left breast wound  -con't dry dressing over lateral left breast wound  -abx per id   -ac per primary, monitor cbc transfuse as needed   -onc f/u   -signed out to [phone]

## 2025-01-02 NOTE — PROGRESS NOTE ADULT - SUBJECTIVE AND OBJECTIVE BOX
INTERVAL HPI/OVERNIGHT EVENTS:  seen and examined   Pt resting comfortably    MEDICATIONS  (STANDING):  apixaban 5 milliGRAM(s) Oral every 12 hours  folic acid 1 milliGRAM(s) Oral daily  guaiFENesin Oral Liquid (Sugar-Free) 200 milliGRAM(s) Oral every 6 hours  piperacillin/tazobactam IVPB.. 3.375 Gram(s) IV Intermittent every 8 hours    MEDICATIONS  (PRN):  acetaminophen     Tablet .. 650 milliGRAM(s) Oral every 6 hours PRN Temp greater or equal to 38C (100.4F), Mild Pain (1 - 3)  acetaminophen   IVPB .. 1000 milliGRAM(s) IV Intermittent once PRN Temp greater or equal to 38C (100.4F), Mild Pain (1 - 3)  aluminum hydroxide/magnesium hydroxide/simethicone Suspension 30 milliLiter(s) Oral every 4 hours PRN Dyspepsia  melatonin 3 milliGRAM(s) Oral at bedtime PRN Insomnia  ondansetron Injectable 4 milliGRAM(s) IV Push every 8 hours PRN Nausea and/or Vomiting      Vital Signs Last 24 Hrs  T(C): 36.7 (02 Jan 2025 08:10), Max: 37.1 (01 Jan 2025 17:26)  T(F): 98.1 (02 Jan 2025 08:10), Max: 98.8 (01 Jan 2025 17:26)  HR: 115 (02 Jan 2025 08:10) (97 - 116)  BP: 113/71 (02 Jan 2025 08:10) (100/59 - 116/65)  BP(mean): --  RR: 18 (02 Jan 2025 08:10) (18 - 20)  SpO2: 100% (02 Jan 2025 08:10) (97% - 100%)    Parameters below as of 02 Jan 2025 08:10  Patient On (Oxygen Delivery Method): room air        Physical:  general: Nad  respirations: unlabored   Chest: Pendulous breasts. Inferior L breast minimal oozing noted upon packing removal, hemostasis achieved when repacked, deep cavity, packed with kerlix,covered with dry sterile gauze, surrounding cellulitis, lateral breast wound stable, ss drainage covered with dry sterile gauze     I&O's Detail      LABS:                        6.5    4.61  )-----------( 188      ( 02 Jan 2025 05:58 )             20.4             01-02    137  |  105  |  14  ----------------------------<  128[H]  3.6   |  23  |  0.71    Ca    8.7      02 Jan 2025 05:58  Phos  2.6     01-02  Mg     2.1     01-02    TPro  6.3  /  Alb  1.6[L]  /  TBili  0.4  /  DBili  x   /  AST  16  /  ALT  20  /  AlkPhos  85  01-02      39y.o. Female

## 2025-01-03 LAB
ANION GAP SERPL CALC-SCNC: 8 MMOL/L — SIGNIFICANT CHANGE UP (ref 5–17)
BUN SERPL-MCNC: 10 MG/DL — SIGNIFICANT CHANGE UP (ref 7–18)
CALCIUM SERPL-MCNC: 9 MG/DL — SIGNIFICANT CHANGE UP (ref 8.4–10.5)
CHLORIDE SERPL-SCNC: 106 MMOL/L — SIGNIFICANT CHANGE UP (ref 96–108)
CO2 SERPL-SCNC: 25 MMOL/L — SIGNIFICANT CHANGE UP (ref 22–31)
CREAT SERPL-MCNC: 0.62 MG/DL — SIGNIFICANT CHANGE UP (ref 0.5–1.3)
EGFR: 115 ML/MIN/1.73M2 — SIGNIFICANT CHANGE UP
GLUCOSE SERPL-MCNC: 161 MG/DL — HIGH (ref 70–99)
HCT VFR BLD CALC: 22.5 % — LOW (ref 34.5–45)
HGB BLD-MCNC: 7.1 G/DL — LOW (ref 11.5–15.5)
MCHC RBC-ENTMCNC: 24.7 PG — LOW (ref 27–34)
MCHC RBC-ENTMCNC: 31.6 G/DL — LOW (ref 32–36)
MCV RBC AUTO: 78.4 FL — LOW (ref 80–100)
NRBC # BLD: 0 /100 WBCS — SIGNIFICANT CHANGE UP (ref 0–0)
PLATELET # BLD AUTO: 257 K/UL — SIGNIFICANT CHANGE UP (ref 150–400)
POTASSIUM SERPL-MCNC: 3.8 MMOL/L — SIGNIFICANT CHANGE UP (ref 3.5–5.3)
POTASSIUM SERPL-SCNC: 3.8 MMOL/L — SIGNIFICANT CHANGE UP (ref 3.5–5.3)
RBC # BLD: 2.87 M/UL — LOW (ref 3.8–5.2)
RBC # FLD: 22.3 % — HIGH (ref 10.3–14.5)
SODIUM SERPL-SCNC: 139 MMOL/L — SIGNIFICANT CHANGE UP (ref 135–145)
WBC # BLD: 3.03 K/UL — LOW (ref 3.8–10.5)
WBC # FLD AUTO: 3.03 K/UL — LOW (ref 3.8–10.5)

## 2025-01-03 PROCEDURE — 99223 1ST HOSP IP/OBS HIGH 75: CPT

## 2025-01-03 PROCEDURE — 99024 POSTOP FOLLOW-UP VISIT: CPT

## 2025-01-03 RX ADMIN — Medication 200 MILLIGRAM(S): at 11:33

## 2025-01-03 RX ADMIN — Medication 325 MILLIGRAM(S): at 11:33

## 2025-01-03 RX ADMIN — APIXABAN 5 MILLIGRAM(S): 5 TABLET, FILM COATED ORAL at 17:53

## 2025-01-03 RX ADMIN — Medication 200 MILLIGRAM(S): at 17:52

## 2025-01-03 RX ADMIN — PIPERACILLIN AND TAZOBACTAM 25 GRAM(S): 3; .375 INJECTION, POWDER, LYOPHILIZED, FOR SOLUTION INTRAVENOUS at 23:02

## 2025-01-03 RX ADMIN — Medication 200 MILLIGRAM(S): at 05:54

## 2025-01-03 RX ADMIN — APIXABAN 5 MILLIGRAM(S): 5 TABLET, FILM COATED ORAL at 05:54

## 2025-01-03 RX ADMIN — PIPERACILLIN AND TAZOBACTAM 25 GRAM(S): 3; .375 INJECTION, POWDER, LYOPHILIZED, FOR SOLUTION INTRAVENOUS at 05:54

## 2025-01-03 RX ADMIN — Medication 1 MILLIGRAM(S): at 11:33

## 2025-01-03 RX ADMIN — PIPERACILLIN AND TAZOBACTAM 25 GRAM(S): 3; .375 INJECTION, POWDER, LYOPHILIZED, FOR SOLUTION INTRAVENOUS at 16:33

## 2025-01-03 NOTE — CONSULT NOTE ADULT - ASSESSMENT
Patient is a 39yF, recently diagnosed with Left breast CA in nov 2024 on chemo.   EGD not warranted at this time    39-year-old female with a history of breast cancer and PE who presents with left breast infection.  GI consulted for anemia    Anemia  No evidence of overt GI bleeding at this time  Recommend daily PPI  Continue regular diet  If she has ongoing drop in her hemoglobin without additional sources of blood loss we will consider endoscopic intervention next week continue anticoagulation at this time given her acute PEs the risks of an directing therapy outweigh the benefits in the absence of overt GI bleeding

## 2025-01-03 NOTE — CONSULT NOTE ADULT - SUBJECTIVE AND OBJECTIVE BOX
INITIAL GI CONSULTATION    Patient is a 39y old  Female who presents with a chief complaint of Sepsis with Breast wound culture + proteus and staph aureus . (02 Jan 2025 12:22)    HPI:  Patient is a 39yF, recently diagnosed with Left breast CA in nov 2024 on chemo. Patient was previously at [facility] in early december for SOB and chest pain found to have bilateral pulm emboli during the stay patient was offered thrombectomy however patient declined and later changed her mind during the stay however providers recommended the patient continue her medical management Eliquis and Rivaroxaban). Patient follows Dr. Damien STALLINGS and received her first session of chemotherapy on December 5th as per the patient the infusion lasted 6hours and since then she has been having fatigue, sob on exertion, chronic productive cough, nausea and vomiting since the chemotherapy. Patient reports subjective fevers however did not measure the temperature at home. She denies palpitations, chest pain, lightheadedness, syncopal episodes, hemoptysis, hematemesis, hematochezia, melena.  On further questioning patient disclosed she has a biopsy done on her left breast in mid november. The site now appears to have an ulcer. She notes she has chronic discharge serosanguinous occasionally bloody, malodorous.    (31 Dec 2024 12:50)    GI hpi: Patient was seen and examined for GI bleed.   S/p 1 unit of PRBC with last H/h 7.1/22.5 . She denies lightheadedness, syncopal episodes, hemoptysis, hematemesis, hematochezia, melena.       PMH/PSH:  PAST MEDICAL & SURGICAL HISTORY:  Breast cancer      Pulmonary embolism      No significant past surgical history            FH:  FAMILY HISTORY:  No pertinent family history in first degree relatives          MEDS:  MEDICATIONS  (STANDING):  apixaban 5 milliGRAM(s) Oral every 12 hours  ferrous    sulfate 325 milliGRAM(s) Oral daily  folic acid 1 milliGRAM(s) Oral daily  guaiFENesin Oral Liquid (Sugar-Free) 200 milliGRAM(s) Oral every 6 hours  piperacillin/tazobactam IVPB.. 3.375 Gram(s) IV Intermittent every 8 hours    MEDICATIONS  (PRN):  acetaminophen     Tablet .. 650 milliGRAM(s) Oral every 6 hours PRN Temp greater or equal to 38C (100.4F), Mild Pain (1 - 3)  acetaminophen   IVPB .. 1000 milliGRAM(s) IV Intermittent once PRN Temp greater or equal to 38C (100.4F), Mild Pain (1 - 3)  aluminum hydroxide/magnesium hydroxide/simethicone Suspension 30 milliLiter(s) Oral every 4 hours PRN Dyspepsia  melatonin 3 milliGRAM(s) Oral at bedtime PRN Insomnia  ondansetron Injectable 4 milliGRAM(s) IV Push every 8 hours PRN Nausea and/or Vomiting    Allergies    No Known Allergies    Intolerances          ROS: A detailed set of ROS were asked and negative except those outlined in GI HPI.  ______________________________________________________________________  PHYSICAL EXAM:  T(C): 36.7 (01-03-25 @ 07:40), Max: 37.2 (01-02-25 @ 11:16)  HR: 98 (01-03-25 @ 07:40)  BP: 99/63 (01-03-25 @ 07:40)  RR: 18 (01-03-25 @ 07:40)  SpO2: 98% (01-03-25 @ 07:40)  Wt(kg): --      GEN: NAD  HEENT: EOMI, conjunctivae anicteric, neck supple, moist mucous membranes  PULM: LSCTAB, no wheezing, rales, or rhonchi  CV: RRR, no m/r/b  GI: Soft, NT, ND; +BS in all four quadrants, no ascites, no Navarro's sign  MSK: CHANG, no edema  NEURO: A&O x 3, no gross deficits  ______________________________________________________________________  LABS:                        7.1    3.03  )-----------( 257      ( 03 Jan 2025 05:37 )             22.5     01-03    139  |  106  |  10  ----------------------------<  161[H]  3.8   |  25  |  0.62    Ca    9.0      03 Jan 2025 05:37  Phos  2.6     01-02  Mg     2.1     01-02    TPro  6.3  /  Alb  1.6[L]  /  TBili  0.4  /  DBili  x   /  AST  16  /  ALT  20  /  AlkPhos  85  01-02    LIVER FUNCTIONS - ( 02 Jan 2025 05:58 )  Alb: 1.6 g/dL / Pro: 6.3 g/dL / ALK PHOS: 85 U/L / ALT: 20 U/L DA / AST: 16 U/L / GGT: x             ____________________________________________    IMAGING:    *******      This note and its recommendations herein are preliminary until such time as cosigned by an attending.    GI will continue to follow.  Thank you for this consult! INITIAL GI CONSULTATION    Patient is a 39y old  Female who presents with a chief complaint of Sepsis with Breast wound culture + proteus and staph aureus . (02 Jan 2025 12:22)    HPI:  Patient is a 39yF, recently diagnosed with Left breast CA in nov 2024 on chemo. Patient was previously at [facility] in early december for SOB and chest pain found to have bilateral pulm emboli. Patient follows Dr. Damien STALLINGS and received her first session of chemotherapy on December 5th as per the patient the infusion lasted 6hours and since then she has been having fatigue, sob on exertion, chronic productive cough, nausea and vomiting since the chemotherapy. Patient reports subjective fevers however did not measure the temperature at home. She denies palpitations, chest pain, lightheadedness, syncopal episodes, hemoptysis, hematemesis, hematochezia, melena.      GI hpi: Patient was seen and examined for GI bleed.   S/p 1 unit of PRBC with last H/h 7.1/22.5 . She denies lightheadedness, syncopal episodes, hemoptysis, hematemesis, hematochezia, melena.       PMH/PSH:  PAST MEDICAL & SURGICAL HISTORY:  Breast cancer      Pulmonary embolism      No significant past surgical history            FH:  FAMILY HISTORY:  No pertinent family history in first degree relatives          MEDS:  MEDICATIONS  (STANDING):  apixaban 5 milliGRAM(s) Oral every 12 hours  ferrous    sulfate 325 milliGRAM(s) Oral daily  folic acid 1 milliGRAM(s) Oral daily  guaiFENesin Oral Liquid (Sugar-Free) 200 milliGRAM(s) Oral every 6 hours  piperacillin/tazobactam IVPB.. 3.375 Gram(s) IV Intermittent every 8 hours    MEDICATIONS  (PRN):  acetaminophen     Tablet .. 650 milliGRAM(s) Oral every 6 hours PRN Temp greater or equal to 38C (100.4F), Mild Pain (1 - 3)  acetaminophen   IVPB .. 1000 milliGRAM(s) IV Intermittent once PRN Temp greater or equal to 38C (100.4F), Mild Pain (1 - 3)  aluminum hydroxide/magnesium hydroxide/simethicone Suspension 30 milliLiter(s) Oral every 4 hours PRN Dyspepsia  melatonin 3 milliGRAM(s) Oral at bedtime PRN Insomnia  ondansetron Injectable 4 milliGRAM(s) IV Push every 8 hours PRN Nausea and/or Vomiting    Allergies    No Known Allergies    Intolerances          ROS: A detailed set of ROS were asked and negative except those outlined in GI HPI.  ______________________________________________________________________  PHYSICAL EXAM:  T(C): 36.7 (01-03-25 @ 07:40), Max: 37.2 (01-02-25 @ 11:16)  HR: 98 (01-03-25 @ 07:40)  BP: 99/63 (01-03-25 @ 07:40)  RR: 18 (01-03-25 @ 07:40)  SpO2: 98% (01-03-25 @ 07:40)  Wt(kg): --      GEN: NAD  HEENT: EOMI, conjunctivae anicteric, neck supple, moist mucous membranes  PULM: clear to ausculation, left breast discharge, right chest wall tenderness  CV: tachycardic   GI: Soft, NT, ND; +BS in all four quadrants, no ascites,   MSK: CHANG, no edema  NEURO: A&O x 3, no gross deficits  ______________________________________________________________________  LABS:                        7.1    3.03  )-----------( 257      ( 03 Jan 2025 05:37 )             22.5     01-03    139  |  106  |  10  ----------------------------<  161[H]  3.8   |  25  |  0.62    Ca    9.0      03 Jan 2025 05:37  Phos  2.6     01-02  Mg     2.1     01-02    TPro  6.3  /  Alb  1.6[L]  /  TBili  0.4  /  DBili  x   /  AST  16  /  ALT  20  /  AlkPhos  85  01-02    LIVER FUNCTIONS - ( 02 Jan 2025 05:58 )  Alb: 1.6 g/dL / Pro: 6.3 g/dL / ALK PHOS: 85 U/L / ALT: 20 U/L DA / AST: 16 U/L / GGT: x             ____________________________________________        This note and its recommendations herein are preliminary until such time as cosigned by an attending.

## 2025-01-03 NOTE — PROGRESS NOTE ADULT - ASSESSMENT
40 y/o F, recently diagnosed with Left breast CA in nov 2024 on chemo. Patient was previously at [facility] in early december for SOB and chest pain found to have bilateral pulm emboli during the stay patient was offered thrombectomy however patient declined, discharged on Eliquis   Patient follows Dr. Quezada  and received her first session of chemotherapy on December 5th   Presented with fever, left breast wound   Admitted for sepsis likely due to infected left breast wound   Underwent bedside I&D by surgery   Started on zosyn   ID consulted   Continued with Eliquis, pulm and cardiology follow   Pt noted with Hg 6.5.  Received 2 units of PRBC, hemoglobin improved   GI consulted, no overt GI bleeding.  PPI continued. As per GI, if there is ongoing transfusion requirements,  endoscopic intervention to be conceder  next week.

## 2025-01-03 NOTE — PROGRESS NOTE ADULT - ASSESSMENT
Called patient parents left message to call clinic back. If parents call back please ask travel question. Thank you   Patient is a 39y F, with locally advanced TNBC is admitted for sepsis due to possible Left breast ulcer.      Locally advanced TNBC  - s/p carboplatin/paclitaxel/pembrolizumab one dose  - treatment on hold given PE and pt not following with Dr Quezada  - advised patient need to closely follow up with oncology, pt agrees    Anemia  - Patient hb 6.5 on arrival, s/p 1U PRBC, hgb improved to 7.1 today  - no clear bleeding  - daily cbc and transfuse for hb<7. Pt reporting feeling SOB, perhaps due to anemia, would consider 1U PRBC today  - Will need GI to rule out GIB.   - Patient is on eliquis which can exacerbate GIB.  - low folate, cont 1mg folic acid daily  - signs of iron deficiency.  started oral iron ferrous gluconate once a day.     Pulmonary embolism.   - bilateral PEs, patient refused thrombectomy at first at [facility] but then changed her mind and wanted further vascular intervention in 12/2024 when she was at [facility]. However, by then, she had remained stable and the recommendation from vascular was for medical mgmt. She is now asking for vascular intervention again. I informed her that she has remained stable, been satting well on RA, and that there is no vascular consultation here. She became upset. If needed, then would require tranfer back to [facility] for eval, though clinically, other than subjective SOB, she remains stable   - continue on Eliquis  - cardiology and pulmonary consult appreciated  - hopefully her subjective SOB can improve after PRBC transfusion     Sepsis  - s/p IV antibiotics, IVF  - f/u blood cultures, urine cultures  - s/p incision and drainage left breast site with surgery    Will follow, d/w pt and primary team

## 2025-01-03 NOTE — CONSULT NOTE ADULT - NS ATTEND AMEND GEN_ALL_CORE FT
39-year-old female with a history of breast cancer and bilateral PE who presented with sepsis due to an infected breast ulcer GI consulted for anemia.  She denies abdominal pain, nausea, vomiting or diarrhea.  Her bowel movement this morning was brown.  She is on a regular diet.  She reports pain at the site of her port.  She states that she feels short of breath and has a cough.  Hemoglobin dropped to 6.5.  Anemia   Without overt GI bleeding.  PPI daily.  Continue regular diet.  Monitor hemoglobin, if there is ongoing transfusion requirements will consider endoscopic intervention next week.  Patient is not n.p.o. for procedure today
I, Jonn Berman MD, evaluated and examined this patient and made any necessary adjustments to the documentation.

## 2025-01-03 NOTE — PROGRESS NOTE ADULT - SUBJECTIVE AND OBJECTIVE BOX
INTERVAL HPI/OVERNIGHT EVENTS:  Pt resting comfortably. No acute complaints.       MEDICATIONS  (STANDING):  apixaban 5 milliGRAM(s) Oral every 12 hours  ferrous    sulfate 325 milliGRAM(s) Oral daily  folic acid 1 milliGRAM(s) Oral daily  guaiFENesin Oral Liquid (Sugar-Free) 200 milliGRAM(s) Oral every 6 hours  piperacillin/tazobactam IVPB.. 3.375 Gram(s) IV Intermittent every 8 hours    MEDICATIONS  (PRN):  acetaminophen     Tablet .. 650 milliGRAM(s) Oral every 6 hours PRN Temp greater or equal to 38C (100.4F), Mild Pain (1 - 3)  acetaminophen   IVPB .. 1000 milliGRAM(s) IV Intermittent once PRN Temp greater or equal to 38C (100.4F), Mild Pain (1 - 3)  aluminum hydroxide/magnesium hydroxide/simethicone Suspension 30 milliLiter(s) Oral every 4 hours PRN Dyspepsia  melatonin 3 milliGRAM(s) Oral at bedtime PRN Insomnia  ondansetron Injectable 4 milliGRAM(s) IV Push every 8 hours PRN Nausea and/or Vomiting      Vital Signs Last 24 Hrs  T(C): 36.4 (03 Jan 2025 05:11), Max: 37.2 (02 Jan 2025 11:16)  T(F): 97.5 (03 Jan 2025 05:11), Max: 99 (02 Jan 2025 11:16)  HR: 63 (03 Jan 2025 05:11) (63 - 110)  BP: 110/60 (03 Jan 2025 05:11) (94/74 - 110/60)  BP(mean): 68 (02 Jan 2025 11:16) (68 - 68)  RR: 18 (03 Jan 2025 05:11) (18 - 18)  SpO2: 96% (03 Jan 2025 05:11) (96% - 99%)    Parameters below as of 03 Jan 2025 05:11  Patient On (Oxygen Delivery Method): room air      Physical:  General: A&Ox3. NAD.  Abdomen: Soft nondistended, nontender.  Chest: Pendulous breasts with dressings intact, to be changed after pt premedicated     LABS:                      7.1    3.03  )-----------( 257      ( 03 Jan 2025 05:37 )             22.5             01-03    139  |  106  |  10  ----------------------------<  161[H]  3.8   |  25  |  0.62    Ca    9.0      03 Jan 2025 05:37  Phos  2.6     01-02  Mg     2.1     01-02    TPro  6.3  /  Alb  1.6[L]  /  TBili  0.4  /  DBili  x   /  AST  16  /  ALT  20  /  AlkPhos  85  01-02 INTERVAL HPI/OVERNIGHT EVENTS:  Reports feeling better overall and has been ambulating. Continues to have drainage from left breast. Patient was teary with concerns regarding fertility after chemotherapy and expressed gratitude for care provided by surgical team.        MEDICATIONS  (STANDING):  apixaban 5 milliGRAM(s) Oral every 12 hours  ferrous    sulfate 325 milliGRAM(s) Oral daily  folic acid 1 milliGRAM(s) Oral daily  guaiFENesin Oral Liquid (Sugar-Free) 200 milliGRAM(s) Oral every 6 hours  piperacillin/tazobactam IVPB.. 3.375 Gram(s) IV Intermittent every 8 hours        MEDICATIONS  (PRN):  acetaminophen     Tablet .. 650 milliGRAM(s) Oral every 6 hours PRN Temp greater or equal to 38C (100.4F), Mild Pain (1 - 3)  acetaminophen   IVPB .. 1000 milliGRAM(s) IV Intermittent once PRN Temp greater or equal to 38C (100.4F), Mild Pain (1 - 3)  aluminum hydroxide/magnesium hydroxide/simethicone Suspension 30 milliLiter(s) Oral every 4 hours PRN Dyspepsia  melatonin 3 milliGRAM(s) Oral at bedtime PRN Insomnia  ondansetron Injectable 4 milliGRAM(s) IV Push every 8 hours PRN Nausea and/or Vomiting      Vital Signs Last 24 Hrs  T(C): 36.4 (03 Jan 2025 05:11), Max: 37.2 (02 Jan 2025 11:16)  T(F): 97.5 (03 Jan 2025 05:11), Max: 99 (02 Jan 2025 11:16)  HR: 63 (03 Jan 2025 05:11) (63 - 110)  BP: 110/60 (03 Jan 2025 05:11) (94/74 - 110/60)  BP(mean): 68 (02 Jan 2025 11:16) (68 - 68)  RR: 18 (03 Jan 2025 05:11) (18 - 18)  SpO2: 96% (03 Jan 2025 05:11) (96% - 99%)    Parameters below as of 03 Jan 2025 05:11  Patient On (Oxygen Delivery Method): room air      Physical:  General: A&Ox3. NAD.  Abdomen: Soft nondistended, nontender.  Breast: Left breast larger than right with significant skin thickening and erythema, inferior wound with packing in place and serous pink drainage, left palpable bulky adenopathy.       LABS:                      7.1    3.03  )-----------( 257      ( 03 Jan 2025 05:37 )             22.5             01-03    139  |  106  |  10  ----------------------------<  161[H]  3.8   |  25  |  0.62    Ca    9.0      03 Jan 2025 05:37  Phos  2.6     01-02  Mg     2.1     01-02    TPro  6.3  /  Alb  1.6[L]  /  TBili  0.4  /  DBili  x   /  AST  16  /  ALT  20  /  AlkPhos  85  01-02 INTERVAL HPI/OVERNIGHT EVENTS:  Reports feeling better overall and has been ambulating. Continues to have drainage from left breast. Patient was teary with concerns regarding fertility after chemotherapy and expressed gratitude for care provided by surgical team.        MEDICATIONS  (STANDING):  apixaban 5 milliGRAM(s) Oral every 12 hours  ferrous    sulfate 325 milliGRAM(s) Oral daily  folic acid 1 milliGRAM(s) Oral daily  guaiFENesin Oral Liquid (Sugar-Free) 200 milliGRAM(s) Oral every 6 hours  piperacillin/tazobactam IVPB.. 3.375 Gram(s) IV Intermittent every 8 hours        MEDICATIONS  (PRN):  acetaminophen     Tablet .. 650 milliGRAM(s) Oral every 6 hours PRN Temp greater or equal to 38C (100.4F), Mild Pain (1 - 3)  acetaminophen   IVPB .. 1000 milliGRAM(s) IV Intermittent once PRN Temp greater or equal to 38C (100.4F), Mild Pain (1 - 3)  aluminum hydroxide/magnesium hydroxide/simethicone Suspension 30 milliLiter(s) Oral every 4 hours PRN Dyspepsia  melatonin 3 milliGRAM(s) Oral at bedtime PRN Insomnia  ondansetron Injectable 4 milliGRAM(s) IV Push every 8 hours PRN Nausea and/or Vomiting      Vital Signs Last 24 Hrs  T(C): 36.4 (03 Jan 2025 05:11), Max: 37.2 (02 Jan 2025 11:16)  T(F): 97.5 (03 Jan 2025 05:11), Max: 99 (02 Jan 2025 11:16)  HR: 63 (03 Jan 2025 05:11) (63 - 110)  BP: 110/60 (03 Jan 2025 05:11) (94/74 - 110/60)  BP(mean): 68 (02 Jan 2025 11:16) (68 - 68)  RR: 18 (03 Jan 2025 05:11) (18 - 18)  SpO2: 96% (03 Jan 2025 05:11) (96% - 99%)    Parameters below as of 03 Jan 2025 05:11  Patient On (Oxygen Delivery Method): room air      Physical:  General: A&Ox3. NAD.  Abdomen: Soft nondistended, nontender.  Breast: Left breast larger than right with significant skin thickening and erythema, inferior wound with packing in place and serous pink drainage, left axillary palpable bulky adenopathy.       LABS:                      7.1    3.03  )-----------( 257      ( 03 Jan 2025 05:37 )             22.5             01-03    139  |  106  |  10  ----------------------------<  161[H]  3.8   |  25  |  0.62    Ca    9.0      03 Jan 2025 05:37  Phos  2.6     01-02  Mg     2.1     01-02    TPro  6.3  /  Alb  1.6[L]  /  TBili  0.4  /  DBili  x   /  AST  16  /  ALT  20  /  AlkPhos  85  01-02

## 2025-01-03 NOTE — PROGRESS NOTE ADULT - SUBJECTIVE AND OBJECTIVE BOX
Pt seen, was well appaering until I started to speak with her, then she became upset, tearful, reports that she is not getting enough O2 (while satting well on RA), and asks to have her PE removed    MEDICATIONS  (STANDING):  apixaban 5 milliGRAM(s) Oral every 12 hours  ferrous    sulfate 325 milliGRAM(s) Oral daily  folic acid 1 milliGRAM(s) Oral daily  guaiFENesin Oral Liquid (Sugar-Free) 200 milliGRAM(s) Oral every 6 hours  piperacillin/tazobactam IVPB.. 3.375 Gram(s) IV Intermittent every 8 hours    MEDICATIONS  (PRN):  acetaminophen     Tablet .. 650 milliGRAM(s) Oral every 6 hours PRN Temp greater or equal to 38C (100.4F), Mild Pain (1 - 3)  acetaminophen   IVPB .. 1000 milliGRAM(s) IV Intermittent once PRN Temp greater or equal to 38C (100.4F), Mild Pain (1 - 3)  aluminum hydroxide/magnesium hydroxide/simethicone Suspension 30 milliLiter(s) Oral every 4 hours PRN Dyspepsia  melatonin 3 milliGRAM(s) Oral at bedtime PRN Insomnia  ondansetron Injectable 4 milliGRAM(s) IV Push every 8 hours PRN Nausea and/or Vomiting      ROS  limited 2/2 participation, as above    Vital Signs Last 24 Hrs  T(C): 36.7 (03 Jan 2025 07:40), Max: 37.2 (02 Jan 2025 11:16)  T(F): 98.1 (03 Jan 2025 07:40), Max: 99 (02 Jan 2025 11:16)  HR: 98 (03 Jan 2025 07:40) (63 - 110)  BP: 99/63 (03 Jan 2025 07:40) (94/74 - 110/60)  BP(mean): 75 (03 Jan 2025 07:40) (68 - 75)  RR: 18 (03 Jan 2025 07:40) (18 - 18)  SpO2: 98% (03 Jan 2025 07:40) (96% - 99%)    Parameters below as of 03 Jan 2025 07:40  Patient On (Oxygen Delivery Method): room air        PE  NAD  Awake, alert  limited 2/2 participation, as above                          7.1    3.03  )-----------( 257      ( 03 Jan 2025 05:37 )             22.5       01-03    139  |  106  |  10  ----------------------------<  161[H]  3.8   |  25  |  0.62    Ca    9.0      03 Jan 2025 05:37  Phos  2.6     01-02  Mg     2.1     01-02    TPro  6.3  /  Alb  1.6[L]  /  TBili  0.4  /  DBili  x   /  AST  16  /  ALT  20  /  AlkPhos  85  01-02

## 2025-01-03 NOTE — PROGRESS NOTE ADULT - SUBJECTIVE AND OBJECTIVE BOX
NP Note discussed with  primary attending    Patient is a 39y old  Female who presents with a chief complaint of Sepsis with Breast wound culture + proteus and staph aureus . (02 Jan 2025 12:22)      INTERVAL HPI/OVERNIGHT EVENTS: no new complaints    MEDICATIONS  (STANDING):  apixaban 5 milliGRAM(s) Oral every 12 hours  ferrous    sulfate 325 milliGRAM(s) Oral daily  folic acid 1 milliGRAM(s) Oral daily  guaiFENesin Oral Liquid (Sugar-Free) 200 milliGRAM(s) Oral every 6 hours  piperacillin/tazobactam IVPB.. 3.375 Gram(s) IV Intermittent every 8 hours    MEDICATIONS  (PRN):  acetaminophen     Tablet .. 650 milliGRAM(s) Oral every 6 hours PRN Temp greater or equal to 38C (100.4F), Mild Pain (1 - 3)  acetaminophen   IVPB .. 1000 milliGRAM(s) IV Intermittent once PRN Temp greater or equal to 38C (100.4F), Mild Pain (1 - 3)  aluminum hydroxide/magnesium hydroxide/simethicone Suspension 30 milliLiter(s) Oral every 4 hours PRN Dyspepsia  melatonin 3 milliGRAM(s) Oral at bedtime PRN Insomnia  ondansetron Injectable 4 milliGRAM(s) IV Push every 8 hours PRN Nausea and/or Vomiting      __________________________________________________  REVIEW OF SYSTEMS:    CONSTITUTIONAL: No fever,   RESPIRATORY: No cough; No shortness of breath  CARDIOVASCULAR: No chest pain, no palpitations  GASTROINTESTINAL: No pain. No nausea or vomiting; No diarrhea   NEUROLOGICAL: No headache or numbness, no tremors  MUSCULOSKELETAL: No joint pain, no muscle pain  GENITOURINARY: no dysuria, no frequency, no hesitancy  PSYCHIATRY: no depression , + anxiety        Vital Signs Last 24 Hrs  T(C): 36.5 (03 Jan 2025 10:55), Max: 36.8 (03 Jan 2025 10:20)  T(F): 97.7 (03 Jan 2025 10:55), Max: 98.2 (03 Jan 2025 10:20)  HR: 92 (03 Jan 2025 10:55) (63 - 99)  BP: 97/54 (03 Jan 2025 10:55) (94/74 - 110/60)  BP(mean): 75 (03 Jan 2025 07:40) (75 - 75)  RR: 18 (03 Jan 2025 10:55) (18 - 18)  SpO2: 98% (03 Jan 2025 10:55) (96% - 100%)    Parameters below as of 03 Jan 2025 10:55  Patient On (Oxygen Delivery Method): room air        ________________________________________________  PHYSICAL EXAM:  GENERAL: NAD  CHEST/LUNG: Clear to ausculitation bilaterally   HEART: S1 S2  regular; no murmurs, gallops or rubs  ABDOMEN: Soft, Nontender, Nondistended; Bowel sounds present  EXTREMITIES: no cyanosis; + LE swelling; no calf tenderness  SKIN: left breast inferior and lateral wounds, + dressings intact   NERVOUS SYSTEM:  Awake and alert; Oriented  to place, person and time ; no new deficits    _________________________________________________  LABS:                        7.1    3.03  )-----------( 257      ( 03 Jan 2025 05:37 )             22.5     01-03    139  |  106  |  10  ----------------------------<  161[H]  3.8   |  25  |  0.62    Ca    9.0      03 Jan 2025 05:37  Phos  2.6     01-02  Mg     2.1     01-02    TPro  6.3  /  Alb  1.6[L]  /  TBili  0.4  /  DBili  x   /  AST  16  /  ALT  20  /  AlkPhos  85  01-02      Urinalysis Basic - ( 03 Jan 2025 05:37 )    Color: x / Appearance: x / SG: x / pH: x  Gluc: 161 mg/dL / Ketone: x  / Bili: x / Urobili: x   Blood: x / Protein: x / Nitrite: x   Leuk Esterase: x / RBC: x / WBC x   Sq Epi: x / Non Sq Epi: x / Bacteria: x      CAPILLARY BLOOD GLUCOSE            RADIOLOGY & ADDITIONAL TESTS:    Imaging Personally Reviewed:  YES/NO    Consultant(s) Notes Reviewed:   YES/ No    Care Discussed with Consultants :     Plan of care was discussed with patient and /or primary care giver; all questions and concerns were addressed and care was aligned with patient's wishes.

## 2025-01-03 NOTE — PROGRESS NOTE ADULT - ASSESSMENT
38 y/o F, recently diagnosed with Left breast CA in nov 2024 on chemo. Patient was previously at [facility] in early december for SOB and chest pain found to have bilateral pulm emboli during the stay patient was offered thrombectomy however patient declined, discharged on Eliquis   Patient follows Dr. Quezada  and received her first session of chemotherapy on December 5th   Presented with fever, left breast wound   Admitted for sepsis likely due to infected left breast wound   Underwent bedside I&D by surgery   Started on zosyn   ID consulted   Continued with Eliquis, pulm and cardiology follow   Pt noted with Hg 6.5.  Received 2 units of PRBC, hemoglobin improved   GI consulted, no overt GI bleeding.  PPI continued. As per GI, if there is ongoing transfusion requirements,  endoscopic intervention to be conceder  next week.

## 2025-01-03 NOTE — PROGRESS NOTE ADULT - ASSESSMENT
Patient is a 39yF, recently diagnosed with Left breast CA in nov 2024 on chemo. Recently in [facility] for bilateral PE refused thrombectomy initially, later agreeable however providers recommended medical management with eliquis. Patient presented to the ED with continued cough and sob on exertion which started when the PE was discovered. In the ED febrile Tmax 101, tachycardic. On exam patient has a left breast ulcer which has been present since the biopsy was performed in november. Patient is being admitted for Sepsis 2/2 Left breast ulcer and management of PE.     # Sepsis 2/2 wound  #  Ulcer of skin of breast.   # PE  # anemia  - surgery f/u  - pulm f/u  - GI f/u   - transfuse  as needed

## 2025-01-03 NOTE — PROGRESS NOTE ADULT - SUBJECTIVE AND OBJECTIVE BOX
PATIENT SEEN AND EXAMINED BY ZAID LORD M.D. ON :- 1/3/25  DATE OF SERVICE:    1/3/25         Interim events noted,Labs ,Radiological studies and Cardiology tests reviewed .    Patient is a 39y old  Female who presents with a chief complaint of Sepsis with Breast wound culture + proteus and staph aureus . (02 Jan 2025 12:22)      HPI:  Patient is a 39yF, recently diagnosed with Left breast CA in nov 2024 on chemo. Patient was previously at [facility] in early december for SOB and chest pain found to have bilateral pulm emboli during the stay patient was offered thrombectomy however patient declined and later changed her mind during the stay however providers recommended the patient continue her medical management Eliquis and Rivaroxaban). Patient follows Dr. Damien STALLINGS and received her first session of chemotherapy on December 5th as per the patient the infusion lasted 6hours and since then she has been having fatigue, sob on exertion, chronic productive cough, nausea and vomiting since the chemotherapy. Patient reports subjective fevers however did not measure the temperature at home. She denies palpitations, chest pain, lightheadedness, syncopal episodes, hemoptysis, hematemesis, hematochezia, melena.  On further questioning patient disclosed she has a biopsy done on her left breast in mid november. The site now appears to have an ulcer. She notes she has chronic discharge serosanguinous occasionally bloody, malodorous.    (31 Dec 2024 12:50)      PAST MEDICAL & SURGICAL HISTORY:  Breast cancer      Pulmonary embolism      No significant past surgical history          PREVIOUS DIAGNOSTIC TESTING:      ECHO  FINDINGS:    STRESS  FINDINGS:    CATHETERIZATION  FINDINGS:    MEDICATIONS  (STANDING):  apixaban 5 milliGRAM(s) Oral every 12 hours  ferrous    sulfate 325 milliGRAM(s) Oral daily  folic acid 1 milliGRAM(s) Oral daily  guaiFENesin Oral Liquid (Sugar-Free) 200 milliGRAM(s) Oral every 6 hours  piperacillin/tazobactam IVPB.. 3.375 Gram(s) IV Intermittent every 8 hours    MEDICATIONS  (PRN):  acetaminophen     Tablet .. 650 milliGRAM(s) Oral every 6 hours PRN Temp greater or equal to 38C (100.4F), Mild Pain (1 - 3)  acetaminophen   IVPB .. 1000 milliGRAM(s) IV Intermittent once PRN Temp greater or equal to 38C (100.4F), Mild Pain (1 - 3)  aluminum hydroxide/magnesium hydroxide/simethicone Suspension 30 milliLiter(s) Oral every 4 hours PRN Dyspepsia  melatonin 3 milliGRAM(s) Oral at bedtime PRN Insomnia  ondansetron Injectable 4 milliGRAM(s) IV Push every 8 hours PRN Nausea and/or Vomiting      FAMILY HISTORY:  No pertinent family history in first degree relatives        SOCIAL HISTORY:    CIGARETTES:    ALCOHOL:    REVIEW OF SYSTEMS:  CONSTITUTIONAL: No fever, weight loss, or fatigue  EYES: No eye pain, visual disturbances, or discharge  ENMT:  No difficulty hearing, tinnitus, vertigo; No sinus or throat pain  NECK: No pain or stiffness  RESPIRATORY: No cough, wheezing, chills or hemoptysis; No shortness of breath  CARDIOVASCULAR: No chest pain, palpitations, dizziness, or leg swelling  GASTROINTESTINAL: No abdominal or epigastric pain. No nausea, vomiting, or hematemesis; No diarrhea or constipation. No melena or hematochezia.  GENITOURINARY: No dysuria, frequency, hematuria, or incontinence  NEUROLOGICAL: No headaches, memory loss, loss of strength, numbness, or tremors  SKIN: No itching, burning, rashes, or lesions   LYMPH NODES: No enlarged glands  ENDOCRINE: No heat or cold intolerance; No hair loss  MUSCULOSKELETAL: No joint pain or swelling; No muscle, back, or extremity pain  PSYCHIATRIC: No depression, anxiety, mood swings, or difficulty sleeping  HEME/LYMPH: No easy bruising, or bleeding gums  ALLERY AND IMMUNOLOGIC: No hives or eczema    Vital Signs Last 24 Hrs  T(C): 36.6 (03 Jan 2025 19:44), Max: 36.8 (03 Jan 2025 10:20)  T(F): 97.8 (03 Jan 2025 19:44), Max: 98.2 (03 Jan 2025 10:20)  HR: 94 (03 Jan 2025 15:26) (63 - 99)  BP: 102/58 (03 Jan 2025 19:44) (97/54 - 110/60)  BP(mean): 75 (03 Jan 2025 07:40) (75 - 75)  RR: 19 (03 Jan 2025 19:44) (18 - 19)  SpO2: 96% (03 Jan 2025 19:44) (96% - 100%)    Parameters below as of 03 Jan 2025 19:44  Patient On (Oxygen Delivery Method): room air          PHYSICAL EXAM:  GENERAL: NAD, well-groomed, well-developed  HEAD:  Atraumatic, Normocephalic  EYES: EOMI, PERRLA, conjunctiva and sclera clear  ENMT: No tonsillar erythema, exudates, or enlargement; Moist mucous membranes, Good dentition, No lesions  NECK: Supple, No JVD, Normal thyroid  NERVOUS SYSTEM:  Alert & Oriented X3, Good concentration; Motor Strength 5/5 B/L upper and lower extremities; DTRs 2+ intact and symmetric  CHEST/LUNG: Clear to percussion bilaterally; No rales, rhonchi, wheezing, or rubs  HEART: Regular rate and rhythm; No murmurs, rubs, or gallops  ABDOMEN: Soft, Nontender, Nondistended; Bowel sounds present  EXTREMITIES:  2+ Peripheral Pulses, No clubbing, cyanosis, or edema  LYMPH: No lymphadenopathy noted  SKIN: No rashes or lesions      INTERPRETATION OF TELEMETRY:    ECG:    CHADSVASC:     LABS:                        7.1    3.03  )-----------( 257      ( 03 Jan 2025 05:37 )             22.5     01-03    139  |  106  |  10  ----------------------------<  161[H]  3.8   |  25  |  0.62    Ca    9.0      03 Jan 2025 05:37  Phos  2.6     01-02  Mg     2.1     01-02    TPro  6.3  /  Alb  1.6[L]  /  TBili  0.4  /  DBili  x   /  AST  16  /  ALT  20  /  AlkPhos  85  01-02          Urinalysis Basic - ( 03 Jan 2025 05:37 )    Color: x / Appearance: x / SG: x / pH: x  Gluc: 161 mg/dL / Ketone: x  / Bili: x / Urobili: x   Blood: x / Protein: x / Nitrite: x   Leuk Esterase: x / RBC: x / WBC x   Sq Epi: x / Non Sq Epi: x / Bacteria: x      Lipid Panel:   I&O's Summary    03 Jan 2025 07:01  -  03 Jan 2025 23:21  --------------------------------------------------------  IN: 300 mL / OUT: 1 mL / NET: 299 mL        RADIOLOGY & ADDITIONAL STUDIES:    < from: TTE W or WO Ultrasound Enhancing Agent (12.12.24 @ 18:31) >     CONCLUSIONS:      1. Technically difficulty study.   2. Left ventricular endocardium is not well visualized; however, the left ventricular systolic function appears grossly normal.   3. There is poor visualization of all the endocardial borders to determine the presence of wall motion abnormalities. Probably normal.   4. The right ventricle is not well visualized. Based on visual assessment, the right ventricle appears normal in size and right ventricular systolic function is normal.   5. Unable to exclude the presence of right atrium, superior or inferior vena cava mass.   6. No significant valvular disease within study limitations.   7. No prior echocardiogram is available for comparison.    < end of copied text >

## 2025-01-03 NOTE — PROGRESS NOTE ADULT - ASSESSMENT
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 I provided my business card and contact information for any questions or concerns that may arise.  - Signed out to PA covering [phone]

## 2025-01-03 NOTE — PROGRESS NOTE ADULT - SUBJECTIVE AND OBJECTIVE BOX
Patient is a 39y old  Female who presents with a chief complaint of Sepsis with Breast wound culture + proteus and staph aureus .      INTERVAL HPI/OVERNIGHT EVENTS: no new complaints    MEDICATIONS  (STANDING):  apixaban 5 milliGRAM(s) Oral every 12 hours  ferrous    sulfate 325 milliGRAM(s) Oral daily  folic acid 1 milliGRAM(s) Oral daily  guaiFENesin Oral Liquid (Sugar-Free) 200 milliGRAM(s) Oral every 6 hours  piperacillin/tazobactam IVPB.. 3.375 Gram(s) IV Intermittent every 8 hours    MEDICATIONS  (PRN):  acetaminophen     Tablet .. 650 milliGRAM(s) Oral every 6 hours PRN Temp greater or equal to 38C (100.4F), Mild Pain (1 - 3)  acetaminophen   IVPB .. 1000 milliGRAM(s) IV Intermittent once PRN Temp greater or equal to 38C (100.4F), Mild Pain (1 - 3)  aluminum hydroxide/magnesium hydroxide/simethicone Suspension 30 milliLiter(s) Oral every 4 hours PRN Dyspepsia  melatonin 3 milliGRAM(s) Oral at bedtime PRN Insomnia  ondansetron Injectable 4 milliGRAM(s) IV Push every 8 hours PRN Nausea and/or Vomiting      __________________________________________________  REVIEW OF SYSTEMS:    CONSTITUTIONAL: No fever,   RESPIRATORY: No cough; No shortness of breath  CARDIOVASCULAR: No chest pain, no palpitations  GASTROINTESTINAL: No pain. No nausea or vomiting; No diarrhea   NEUROLOGICAL: No headache or numbness, no tremors  MUSCULOSKELETAL: No joint pain, no muscle pain  GENITOURINARY: no dysuria, no frequency, no hesitancy  PSYCHIATRY: no depression , + anxiety        Vital Signs Last 24 Hrs  T(C): 36.5 (03 Jan 2025 10:55), Max: 36.8 (03 Jan 2025 10:20)  T(F): 97.7 (03 Jan 2025 10:55), Max: 98.2 (03 Jan 2025 10:20)  HR: 92 (03 Jan 2025 10:55) (63 - 99)  BP: 97/54 (03 Jan 2025 10:55) (94/74 - 110/60)  BP(mean): 75 (03 Jan 2025 07:40) (75 - 75)  RR: 18 (03 Jan 2025 10:55) (18 - 18)  SpO2: 98% (03 Jan 2025 10:55) (96% - 100%)    Parameters below as of 03 Jan 2025 10:55  Patient On (Oxygen Delivery Method): room air        ________________________________________________  PHYSICAL EXAM:  GENERAL: NAD  CHEST/LUNG: dec breath sounds at bases  HEART: S1 S2 +  ABDOMEN: Soft, Nontender, Nondistended; Bowel sounds present  EXTREMITIES: no cyanosis; + LE swelling; no calf tenderness  SKIN: left breast inferior and lateral wounds, + dressings intact   NERVOUS SYSTEM:  Awake and alert;   _________________________________________________  LABS:                        7.1    3.03  )-----------( 257      ( 03 Jan 2025 05:37 )             22.5     01-03    139  |  106  |  10  ----------------------------<  161[H]  3.8   |  25  |  0.62    Ca    9.0      03 Jan 2025 05:37  Phos  2.6     01-02  Mg     2.1     01-02    TPro  6.3  /  Alb  1.6[L]  /  TBili  0.4  /  DBili  x   /  AST  16  /  ALT  20  /  AlkPhos  85  01-02      Urinalysis Basic - ( 03 Jan 2025 05:37 )    Color: x / Appearance: x / SG: x / pH: x  Gluc: 161 mg/dL / Ketone: x  / Bili: x / Urobili: x   Blood: x / Protein: x / Nitrite: x   Leuk Esterase: x / RBC: x / WBC x   Sq Epi: x / Non Sq Epi: x / Bacteria: x      CAPILLARY BLOOD GLUCOSE            RADIOLOGY & ADDITIONAL TESTS:    Imaging Personally Reviewed:  YES/NO    Consultant(s) Notes Reviewed:   YES/ No    Care Discussed with Consultants :     Plan of care was discussed with patient and /or primary care giver; all questions and concerns were addressed and care was aligned with patient's wishes.

## 2025-01-04 LAB
ANION GAP SERPL CALC-SCNC: 7 MMOL/L — SIGNIFICANT CHANGE UP (ref 5–17)
BUN SERPL-MCNC: 9 MG/DL — SIGNIFICANT CHANGE UP (ref 7–18)
CALCIUM SERPL-MCNC: 8.9 MG/DL — SIGNIFICANT CHANGE UP (ref 8.4–10.5)
CHLORIDE SERPL-SCNC: 110 MMOL/L — HIGH (ref 96–108)
CO2 SERPL-SCNC: 25 MMOL/L — SIGNIFICANT CHANGE UP (ref 22–31)
CREAT SERPL-MCNC: 0.59 MG/DL — SIGNIFICANT CHANGE UP (ref 0.5–1.3)
EGFR: 117 ML/MIN/1.73M2 — SIGNIFICANT CHANGE UP
GLUCOSE SERPL-MCNC: 117 MG/DL — HIGH (ref 70–99)
HCT VFR BLD CALC: 27.8 % — LOW (ref 34.5–45)
HGB BLD-MCNC: 9.2 G/DL — LOW (ref 11.5–15.5)
MCHC RBC-ENTMCNC: 26.3 PG — LOW (ref 27–34)
MCHC RBC-ENTMCNC: 33.1 G/DL — SIGNIFICANT CHANGE UP (ref 32–36)
MCV RBC AUTO: 79.4 FL — LOW (ref 80–100)
NRBC # BLD: 0 /100 WBCS — SIGNIFICANT CHANGE UP (ref 0–0)
PLATELET # BLD AUTO: 330 K/UL — SIGNIFICANT CHANGE UP (ref 150–400)
POTASSIUM SERPL-MCNC: 4.1 MMOL/L — SIGNIFICANT CHANGE UP (ref 3.5–5.3)
POTASSIUM SERPL-SCNC: 4.1 MMOL/L — SIGNIFICANT CHANGE UP (ref 3.5–5.3)
RBC # BLD: 3.5 M/UL — LOW (ref 3.8–5.2)
RBC # FLD: 21.7 % — HIGH (ref 10.3–14.5)
SODIUM SERPL-SCNC: 142 MMOL/L — SIGNIFICANT CHANGE UP (ref 135–145)
WBC # BLD: 3.56 K/UL — LOW (ref 3.8–10.5)
WBC # FLD AUTO: 3.56 K/UL — LOW (ref 3.8–10.5)

## 2025-01-04 RX ADMIN — Medication 1 MILLIGRAM(S): at 13:15

## 2025-01-04 RX ADMIN — APIXABAN 5 MILLIGRAM(S): 5 TABLET, FILM COATED ORAL at 17:05

## 2025-01-04 RX ADMIN — PIPERACILLIN AND TAZOBACTAM 25 GRAM(S): 3; .375 INJECTION, POWDER, LYOPHILIZED, FOR SOLUTION INTRAVENOUS at 22:53

## 2025-01-04 RX ADMIN — Medication 325 MILLIGRAM(S): at 13:16

## 2025-01-04 RX ADMIN — APIXABAN 5 MILLIGRAM(S): 5 TABLET, FILM COATED ORAL at 05:31

## 2025-01-04 RX ADMIN — PIPERACILLIN AND TAZOBACTAM 25 GRAM(S): 3; .375 INJECTION, POWDER, LYOPHILIZED, FOR SOLUTION INTRAVENOUS at 13:16

## 2025-01-04 RX ADMIN — PIPERACILLIN AND TAZOBACTAM 25 GRAM(S): 3; .375 INJECTION, POWDER, LYOPHILIZED, FOR SOLUTION INTRAVENOUS at 05:31

## 2025-01-04 RX ADMIN — Medication 200 MILLIGRAM(S): at 05:31

## 2025-01-04 NOTE — PROGRESS NOTE ADULT - ASSESSMENT
Left breast abscess - s/p I&D      Plan - Cont  Zosyn 3.375 gms iv q8hrs for now  if ready for DC home tomorrow then can DC on Augmentin 875 mgs po BID x 7 days more.  reconsult prn.

## 2025-01-04 NOTE — PROGRESS NOTE ADULT - ASSESSMENT
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

## 2025-01-04 NOTE — PROGRESS NOTE ADULT - ASSESSMENT
Patient is a 39yF, recently diagnosed with Left breast CA in nov 2024 on chemo. Recently in [facility] for bilateral PE refused thrombectomy initially, later agreeable however providers recommended medical management with eliquis. Patient presented to the ED with continued cough and sob on exertion which started when the PE was discovered. In the ED febrile Tmax 101, tachycardic. On exam patient has a left breast ulcer which has been present since the biopsy was performed in november. Patient is being admitted for Sepsis 2/2 Left breast ulcer and management of PE.     # Sepsis 2/2 wound  #  Ulcer of skin of breast.   # PE  # anemia  - surgery f/u  - pulm f/u  - GI f/u   - transfuse  as needed

## 2025-01-04 NOTE — PROGRESS NOTE ADULT - ASSESSMENT
38 y/o F, recently diagnosed with Left breast CA in nov 2024 on chemo. Patient was previously at [facility] in early december for SOB and chest pain found to have bilateral pulm emboli during the stay patient was offered thrombectomy however patient declined, discharged on Eliquis   Patient follows Dr. Quezada  and received her first session of chemotherapy on December 5th   Presented with fever, left breast wound   Admitted for sepsis likely due to infected left breast wound   Underwent bedside I&D by surgery   Started on zosyn   ID consulted   Continued with Eliquis, pulm and cardiology follow   Pt noted with Hg 6.5.  Received 2 units of PRBC, hemoglobin improved   GI consulted, no overt GI bleeding.  PPI continued. As per GI, if there is ongoing transfusion requirements,  endoscopic intervention to be conceder  next week.

## 2025-01-04 NOTE — PROGRESS NOTE ADULT - SUBJECTIVE AND OBJECTIVE BOX
Patient is a 39y old  Female who presents with a chief complaint of Sepsis with Breast wound culture + proteus and staph aureus .      INTERVAL HPI/OVERNIGHT EVENTS: pt seen and examined    MEDICATIONS  (STANDING):  apixaban 5 milliGRAM(s) Oral every 12 hours  ferrous    sulfate 325 milliGRAM(s) Oral daily  folic acid 1 milliGRAM(s) Oral daily  piperacillin/tazobactam IVPB.. 3.375 Gram(s) IV Intermittent every 8 hours    MEDICATIONS  (PRN):  acetaminophen     Tablet .. 650 milliGRAM(s) Oral every 6 hours PRN Temp greater or equal to 38C (100.4F), Mild Pain (1 - 3)  acetaminophen   IVPB .. 1000 milliGRAM(s) IV Intermittent once PRN Temp greater or equal to 38C (100.4F), Mild Pain (1 - 3)  aluminum hydroxide/magnesium hydroxide/simethicone Suspension 30 milliLiter(s) Oral every 4 hours PRN Dyspepsia  melatonin 3 milliGRAM(s) Oral at bedtime PRN Insomnia  ondansetron Injectable 4 milliGRAM(s) IV Push every 8 hours PRN Nausea and/or Vomiting      __________________________________________________  REVIEW OF SYSTEMS:    CONSTITUTIONAL: No fever,   RESPIRATORY: No cough; No shortness of breath  CARDIOVASCULAR: No chest pain, no palpitations  GASTROINTESTINAL: No pain. No nausea or vomiting; No diarrhea   NEUROLOGICAL: No headache or numbness, no tremors  MUSCULOSKELETAL: No joint pain, no muscle pain  GENITOURINARY: no dysuria, no frequency, no hesitancy  PSYCHIATRY: no depression , + anxiety      Vital Signs Last 24 Hrs  T(C): 36.9 (01-04-25 @ 11:45), Max: 36.9 (01-04-25 @ 11:45)  T(F): 98.4 (01-04-25 @ 11:45), Max: 98.4 (01-04-25 @ 11:45)  HR: 90 (01-04-25 @ 11:45) (83 - 94)  BP: 95/53 (01-04-25 @ 11:45) (88/51 - 112/82)  BP(mean): 67 (01-04-25 @ 11:45) (61 - 91)  RR: 18 (01-04-25 @ 11:45) (18 - 19)  SpO2: 98% (01-04-25 @ 11:45) (96% - 99%)      ________________________________________________  PHYSICAL EXAM:  GENERAL: NAD  CHEST/LUNG: dec breath sounds at bases  HEART: S1 S2 +  ABDOMEN: Soft, Nontender, Nondistended; Bowel sounds present  EXTREMITIES: no cyanosis; + LE swelling; no calf tenderness  SKIN: left breast inferior and lateral wounds, + dressings intact   NERVOUS SYSTEM:  Awake and alert;   _________________________________________________  LABS:  01-04    142  |  110[H]  |  9   ----------------------------<  117[H]  4.1   |  25  |  0.59    Ca    8.9      04 Jan 2025 05:50      Creatinine Trend: 0.59 <--, 0.62 <--, 0.71 <--, 0.85 <--, 1.17 <--                        9.2    3.56  )-----------( 330      ( 04 Jan 2025 05:50 )             27.8     Urine Studies:  Urinalysis Basic - ( 04 Jan 2025 05:50 )    Color:  / Appearance:  / SG:  / pH:   Gluc: 117 mg/dL / Ketone:   / Bili:  / Urobili:    Blood:  / Protein:  / Nitrite:    Leuk Esterase:  / RBC:  / WBC    Sq Epi:  / Non Sq Epi:  / Bacteria:                     RADIOLOGY & ADDITIONAL TESTS:    Imaging Personally Reviewed:  YES/NO    Consultant(s) Notes Reviewed:   YES/ No    Care Discussed with Consultants :     Plan of care was discussed with patient and /or primary care giver; all questions and concerns were addressed and care was aligned with patient's wishes.

## 2025-01-04 NOTE — PROGRESS NOTE ADULT - SUBJECTIVE AND OBJECTIVE BOX
PATIENT SEEN AND EXAMINED BY ZAID LORD M.D. ON :- 1/4/25  DATE OF SERVICE:       1/4/25      Interim events noted,Labs ,Radiological studies and Cardiology tests reviewed .    Patient is a 39y old  Female who presents with a chief complaint of Sepsis with Breast wound culture + proteus and staph aureus . (02 Jan 2025 12:22)      HPI:  Patient is a 39yF, recently diagnosed with Left breast CA in nov 2024 on chemo. Patient was previously at [facility] in early december for SOB and chest pain found to have bilateral pulm emboli during the stay patient was offered thrombectomy however patient declined and later changed her mind during the stay however providers recommended the patient continue her medical management Eliquis and Rivaroxaban). Patient follows Dr. Damien STALLINGS and received her first session of chemotherapy on December 5th as per the patient the infusion lasted 6hours and since then she has been having fatigue, sob on exertion, chronic productive cough, nausea and vomiting since the chemotherapy. Patient reports subjective fevers however did not measure the temperature at home. She denies palpitations, chest pain, lightheadedness, syncopal episodes, hemoptysis, hematemesis, hematochezia, melena.  On further questioning patient disclosed she has a biopsy done on her left breast in mid november. The site now appears to have an ulcer. She notes she has chronic discharge serosanguinous occasionally bloody, malodorous.    (31 Dec 2024 12:50)      PAST MEDICAL & SURGICAL HISTORY:  Breast cancer      Pulmonary embolism      No significant past surgical history          PREVIOUS DIAGNOSTIC TESTING:      ECHO  FINDINGS:    STRESS  FINDINGS:    CATHETERIZATION  FINDINGS:    MEDICATIONS  (STANDING):  apixaban 5 milliGRAM(s) Oral every 12 hours  ferrous    sulfate 325 milliGRAM(s) Oral daily  folic acid 1 milliGRAM(s) Oral daily  piperacillin/tazobactam IVPB.. 3.375 Gram(s) IV Intermittent every 8 hours    MEDICATIONS  (PRN):  acetaminophen     Tablet .. 650 milliGRAM(s) Oral every 6 hours PRN Temp greater or equal to 38C (100.4F), Mild Pain (1 - 3)  acetaminophen   IVPB .. 1000 milliGRAM(s) IV Intermittent once PRN Temp greater or equal to 38C (100.4F), Mild Pain (1 - 3)  aluminum hydroxide/magnesium hydroxide/simethicone Suspension 30 milliLiter(s) Oral every 4 hours PRN Dyspepsia  melatonin 3 milliGRAM(s) Oral at bedtime PRN Insomnia  ondansetron Injectable 4 milliGRAM(s) IV Push every 8 hours PRN Nausea and/or Vomiting      FAMILY HISTORY:  No pertinent family history in first degree relatives        SOCIAL HISTORY:    CIGARETTES:    ALCOHOL:    REVIEW OF SYSTEMS:  CONSTITUTIONAL: No fever, weight loss, or fatigue  EYES: No eye pain, visual disturbances, or discharge  ENMT:  No difficulty hearing, tinnitus, vertigo; No sinus or throat pain  NECK: No pain or stiffness  RESPIRATORY: No cough, wheezing, chills or hemoptysis; No shortness of breath  CARDIOVASCULAR: No chest pain, palpitations, dizziness, or leg swelling  GASTROINTESTINAL: No abdominal or epigastric pain. No nausea, vomiting, or hematemesis; No diarrhea or constipation. No melena or hematochezia.  GENITOURINARY: No dysuria, frequency, hematuria, or incontinence  NEUROLOGICAL: No headaches, memory loss, loss of strength, numbness, or tremors  SKIN: No itching, burning, rashes, or lesions   LYMPH NODES: No enlarged glands  ENDOCRINE: No heat or cold intolerance; No hair loss  MUSCULOSKELETAL: No joint pain or swelling; No muscle, back, or extremity pain  PSYCHIATRIC: No depression, anxiety, mood swings, or difficulty sleeping  HEME/LYMPH: No easy bruising, or bleeding gums  ALLERY AND IMMUNOLOGIC: No hives or eczema    Vital Signs Last 24 Hrs  T(C): 36.8 (04 Jan 2025 19:46), Max: 36.9 (04 Jan 2025 11:45)  T(F): 98.2 (04 Jan 2025 19:46), Max: 98.4 (04 Jan 2025 11:45)  HR: 86 (04 Jan 2025 19:46) (83 - 94)  BP: 107/58 (04 Jan 2025 19:46) (88/51 - 112/82)  BP(mean): 67 (04 Jan 2025 11:45) (61 - 91)  RR: 18 (04 Jan 2025 19:46) (18 - 18)  SpO2: 97% (04 Jan 2025 19:46) (96% - 99%)    Parameters below as of 04 Jan 2025 19:46  Patient On (Oxygen Delivery Method): room air          PHYSICAL EXAM:  GENERAL: NAD, well-groomed, well-developed  HEAD:  Atraumatic, Normocephalic  EYES: EOMI, PERRLA, conjunctiva and sclera clear  ENMT: No tonsillar erythema, exudates, or enlargement; Moist mucous membranes, Good dentition, No lesions  NECK: Supple, No JVD, Normal thyroid  NERVOUS SYSTEM:  Alert & Oriented X3, Good concentration; Motor Strength 5/5 B/L upper and lower extremities; DTRs 2+ intact and symmetric  CHEST/LUNG: Clear to percussion bilaterally; No rales, rhonchi, wheezing, or rubs  HEART: Regular rate and rhythm; No murmurs, rubs, or gallops  ABDOMEN: Soft, Nontender, Nondistended; Bowel sounds present  EXTREMITIES:  2+ Peripheral Pulses, No clubbing, cyanosis, or edema  LYMPH: No lymphadenopathy noted  SKIN: No rashes or lesions      INTERPRETATION OF TELEMETRY:    ECG:    CHADSVASC:     LABS:                        9.2    3.56  )-----------( 330      ( 04 Jan 2025 05:50 )             27.8     01-04    142  |  110[H]  |  9   ----------------------------<  117[H]  4.1   |  25  |  0.59    Ca    8.9      04 Jan 2025 05:50            Urinalysis Basic - ( 04 Jan 2025 05:50 )    Color: x / Appearance: x / SG: x / pH: x  Gluc: 117 mg/dL / Ketone: x  / Bili: x / Urobili: x   Blood: x / Protein: x / Nitrite: x   Leuk Esterase: x / RBC: x / WBC x   Sq Epi: x / Non Sq Epi: x / Bacteria: x      Lipid Panel:   I&O's Summary    03 Jan 2025 07:01  -  04 Jan 2025 07:00  --------------------------------------------------------  IN: 300 mL / OUT: 1 mL / NET: 299 mL        RADIOLOGY & ADDITIONAL STUDIES:    < from: TTE W or WO Ultrasound Enhancing Agent (12.12.24 @ 18:31) >  CONCLUSIONS:      1. Technically difficulty study.   2. Left ventricular endocardium is not well visualized; however, the left ventricular systolic function appears grossly normal.   3. There is poor visualization of all the endocardial borders to determine the presence of wall motion abnormalities. Probably normal.   4. The right ventricle is not well visualized. Based on visual assessment, the right ventricle appears normal in size and right ventricular systolic function is normal.   5. Unable to exclude the presence of right atrium, superior or inferior vena cava mass.   6. No significant valvular disease within study limitations.   7. No prior echocardiogram is available for comparison.    < end of copied text >

## 2025-01-04 NOTE — PROGRESS NOTE ADULT - SUBJECTIVE AND OBJECTIVE BOX
INTERVAL HPI/OVERNIGHT EVENTS:  Pt resting comfortably. No acute complaints.     MEDICATIONS  (STANDING):  apixaban 5 milliGRAM(s) Oral every 12 hours  ferrous    sulfate 325 milliGRAM(s) Oral daily  folic acid 1 milliGRAM(s) Oral daily  piperacillin/tazobactam IVPB.. 3.375 Gram(s) IV Intermittent every 8 hours    MEDICATIONS  (PRN):  acetaminophen     Tablet .. 650 milliGRAM(s) Oral every 6 hours PRN Temp greater or equal to 38C (100.4F), Mild Pain (1 - 3)  acetaminophen   IVPB .. 1000 milliGRAM(s) IV Intermittent once PRN Temp greater or equal to 38C (100.4F), Mild Pain (1 - 3)  aluminum hydroxide/magnesium hydroxide/simethicone Suspension 30 milliLiter(s) Oral every 4 hours PRN Dyspepsia  melatonin 3 milliGRAM(s) Oral at bedtime PRN Insomnia  ondansetron Injectable 4 milliGRAM(s) IV Push every 8 hours PRN Nausea and/or Vomiting      Vital Signs Last 24 Hrs  T(C): 36.7 (04 Jan 2025 07:36), Max: 36.8 (03 Jan 2025 10:20)  T(F): 98.1 (04 Jan 2025 07:36), Max: 98.2 (03 Jan 2025 10:20)  HR: 90 (04 Jan 2025 07:36) (83 - 99)  BP: 88/51 (04 Jan 2025 07:36) (88/51 - 102/58)  BP(mean): 61 (04 Jan 2025 07:36) (61 - 61)  RR: 18 (04 Jan 2025 07:36) (18 - 19)  SpO2: 99% (04 Jan 2025 07:36) (96% - 100%)    Parameters below as of 04 Jan 2025 07:36  Patient On (Oxygen Delivery Method): room air        General: A&Ox3. NAD.  Abdomen: Soft nondistended, nontender.  Breast: Left breast larger than right with significant skin thickening and erythema, inferior wound with packing in place and serous pink drainage, left axillary palpable bulky adenopathy. inferior wound packing removed, repacked with kerlix, pt tolerated well, covered with dry sterile gauze and abd pad     I&O's Detail    03 Jan 2025 07:01  -  04 Jan 2025 07:00  --------------------------------------------------------  IN:    Oral Fluid: 300 mL  Total IN: 300 mL    OUT:    Blood Loss (mL): 1 mL  Total OUT: 1 mL    Total NET: 299 mL          LABS:                        9.2    3.56  )-----------( 330      ( 04 Jan 2025 05:50 )             27.8             01-04    142  |  110[H]  |  9   ----------------------------<  117[H]  4.1   |  25  |  0.59    Ca    8.9      04 Jan 2025 05:50        39y.o. Female

## 2025-01-04 NOTE — PROGRESS NOTE ADULT - SUBJECTIVE AND OBJECTIVE BOX
39y Female    Meds:  piperacillin/tazobactam IVPB.. 3.375 Gram(s) IV Intermittent every 8 hours    Allergies    No Known Allergies    Intolerances        VITALS:  Vital Signs Last 24 Hrs  T(C): 36.9 (04 Jan 2025 15:58), Max: 36.9 (04 Jan 2025 11:45)  T(F): 98.4 (04 Jan 2025 15:58), Max: 98.4 (04 Jan 2025 11:45)  HR: 94 (04 Jan 2025 15:58) (83 - 94)  BP: 103/53 (04 Jan 2025 15:58) (88/51 - 112/82)  BP(mean): 67 (04 Jan 2025 11:45) (61 - 91)  RR: 18 (04 Jan 2025 15:58) (18 - 19)  SpO2: 98% (04 Jan 2025 15:58) (96% - 99%)    Parameters below as of 04 Jan 2025 15:58  Patient On (Oxygen Delivery Method): room air        LABS/DIAGNOSTIC TESTS:                          9.2    3.56  )-----------( 330      ( 04 Jan 2025 05:50 )             27.8         01-04    142  |  110[H]  |  9   ----------------------------<  117[H]  4.1   |  25  |  0.59    Ca    8.9      04 Jan 2025 05:50            CULTURES: .Abscess  12-31 @ 19:30   Numerous Proteus mirabilis  Few Staphylococcus aureus  --  Proteus mirabilis  Staphylococcus aureus      .Blood BLOOD  12-31 @ 07:30   No growth at 4 days  --  --      .Blood BLOOD  12-31 @ 07:20   No growth at 4 days  --  --                      RADIOLOGY:      ROS:  [  ] UNABLE TO ELICIT 39y Female who is doing much better clinically, she is in a better frame of mind and not as depressed, she has no fevers or chills , no diarrhea and no left breast pain. her nurse tells me there was much less drainage from the breast wound when she had her packing done and that it is no longer purulent.    Meds:  piperacillin/tazobactam IVPB.. 3.375 Gram(s) IV Intermittent every 8 hours    Allergies    No Known Allergies    Intolerances        VITALS:  Vital Signs Last 24 Hrs  T(C): 36.9 (04 Jan 2025 15:58), Max: 36.9 (04 Jan 2025 11:45)  T(F): 98.4 (04 Jan 2025 15:58), Max: 98.4 (04 Jan 2025 11:45)  HR: 94 (04 Jan 2025 15:58) (83 - 94)  BP: 103/53 (04 Jan 2025 15:58) (88/51 - 112/82)  BP(mean): 67 (04 Jan 2025 11:45) (61 - 91)  RR: 18 (04 Jan 2025 15:58) (18 - 19)  SpO2: 98% (04 Jan 2025 15:58) (96% - 99%)    Parameters below as of 04 Jan 2025 15:58  Patient On (Oxygen Delivery Method): room air        LABS/DIAGNOSTIC TESTS:                          9.2    3.56  )-----------( 330      ( 04 Jan 2025 05:50 )             27.8         01-04    142  |  110[H]  |  9   ----------------------------<  117[H]  4.1   |  25  |  0.59    Ca    8.9      04 Jan 2025 05:50            CULTURES: .Abscess  12-31 @ 19:30   Numerous Proteus mirabilis  Few Staphylococcus aureus  --  Proteus mirabilis  Staphylococcus aureus      .Blood BLOOD  12-31 @ 07:30   No growth at 4 days  --  --      .Blood BLOOD  12-31 @ 07:20   No growth at 4 days  --  --                      RADIOLOGY:      ROS:  [  ] UNABLE TO ELICIT

## 2025-01-05 VITALS
DIASTOLIC BLOOD PRESSURE: 66 MMHG | RESPIRATION RATE: 18 BRPM | TEMPERATURE: 99 F | SYSTOLIC BLOOD PRESSURE: 155 MMHG | OXYGEN SATURATION: 98 % | HEART RATE: 93 BPM

## 2025-01-05 LAB
CULTURE RESULTS: ABNORMAL
CULTURE RESULTS: SIGNIFICANT CHANGE UP
CULTURE RESULTS: SIGNIFICANT CHANGE UP
HCT VFR BLD CALC: 28.4 % — LOW (ref 34.5–45)
HGB BLD-MCNC: 9 G/DL — LOW (ref 11.5–15.5)
MCHC RBC-ENTMCNC: 26.2 PG — LOW (ref 27–34)
MCHC RBC-ENTMCNC: 31.7 G/DL — LOW (ref 32–36)
MCV RBC AUTO: 82.6 FL — SIGNIFICANT CHANGE UP (ref 80–100)
NRBC # BLD: 0 /100 WBCS — SIGNIFICANT CHANGE UP (ref 0–0)
ORGANISM # SPEC MICROSCOPIC CNT: ABNORMAL
PLATELET # BLD AUTO: 464 K/UL — HIGH (ref 150–400)
RBC # BLD: 3.44 M/UL — LOW (ref 3.8–5.2)
RBC # FLD: 23.1 % — HIGH (ref 10.3–14.5)
SPECIMEN SOURCE: SIGNIFICANT CHANGE UP
WBC # BLD: 3.58 K/UL — LOW (ref 3.8–10.5)
WBC # FLD AUTO: 3.58 K/UL — LOW (ref 3.8–10.5)

## 2025-01-05 PROCEDURE — 81001 URINALYSIS AUTO W/SCOPE: CPT

## 2025-01-05 PROCEDURE — 85379 FIBRIN DEGRADATION QUANT: CPT

## 2025-01-05 PROCEDURE — 36415 COLL VENOUS BLD VENIPUNCTURE: CPT

## 2025-01-05 PROCEDURE — 96376 TX/PRO/DX INJ SAME DRUG ADON: CPT

## 2025-01-05 PROCEDURE — 83605 ASSAY OF LACTIC ACID: CPT

## 2025-01-05 PROCEDURE — 85730 THROMBOPLASTIN TIME PARTIAL: CPT

## 2025-01-05 PROCEDURE — 86923 COMPATIBILITY TEST ELECTRIC: CPT

## 2025-01-05 PROCEDURE — 36430 TRANSFUSION BLD/BLD COMPNT: CPT

## 2025-01-05 PROCEDURE — P9040: CPT

## 2025-01-05 PROCEDURE — 86900 BLOOD TYPING SEROLOGIC ABO: CPT

## 2025-01-05 PROCEDURE — 87077 CULTURE AEROBIC IDENTIFY: CPT

## 2025-01-05 PROCEDURE — 71275 CT ANGIOGRAPHY CHEST: CPT | Mod: MC

## 2025-01-05 PROCEDURE — 83735 ASSAY OF MAGNESIUM: CPT

## 2025-01-05 PROCEDURE — 87075 CULTR BACTERIA EXCEPT BLOOD: CPT

## 2025-01-05 PROCEDURE — 84702 CHORIONIC GONADOTROPIN TEST: CPT

## 2025-01-05 PROCEDURE — 84484 ASSAY OF TROPONIN QUANT: CPT

## 2025-01-05 PROCEDURE — 80048 BASIC METABOLIC PNL TOTAL CA: CPT

## 2025-01-05 PROCEDURE — 84100 ASSAY OF PHOSPHORUS: CPT

## 2025-01-05 PROCEDURE — 93005 ELECTROCARDIOGRAM TRACING: CPT

## 2025-01-05 PROCEDURE — 87637 SARSCOV2&INF A&B&RSV AMP PRB: CPT

## 2025-01-05 PROCEDURE — 87205 SMEAR GRAM STAIN: CPT

## 2025-01-05 PROCEDURE — 87070 CULTURE OTHR SPECIMN AEROBIC: CPT

## 2025-01-05 PROCEDURE — 85025 COMPLETE CBC W/AUTO DIFF WBC: CPT

## 2025-01-05 PROCEDURE — 99285 EMERGENCY DEPT VISIT HI MDM: CPT | Mod: 25

## 2025-01-05 PROCEDURE — 86901 BLOOD TYPING SEROLOGIC RH(D): CPT

## 2025-01-05 PROCEDURE — 87040 BLOOD CULTURE FOR BACTERIA: CPT

## 2025-01-05 PROCEDURE — 80053 COMPREHEN METABOLIC PANEL: CPT

## 2025-01-05 PROCEDURE — 85027 COMPLETE CBC AUTOMATED: CPT

## 2025-01-05 PROCEDURE — 86850 RBC ANTIBODY SCREEN: CPT

## 2025-01-05 PROCEDURE — 85610 PROTHROMBIN TIME: CPT

## 2025-01-05 PROCEDURE — 87186 SC STD MICRODIL/AGAR DIL: CPT

## 2025-01-05 PROCEDURE — 96374 THER/PROPH/DIAG INJ IV PUSH: CPT

## 2025-01-05 PROCEDURE — 83880 ASSAY OF NATRIURETIC PEPTIDE: CPT

## 2025-01-05 RX ORDER — AMOXICILLIN/POTASSIUM CLAV 875-125 MG
875 TABLET ORAL
Qty: 14 | Refills: 0
Start: 2025-01-05 | End: 2025-01-11

## 2025-01-05 RX ADMIN — APIXABAN 5 MILLIGRAM(S): 5 TABLET, FILM COATED ORAL at 17:12

## 2025-01-05 RX ADMIN — Medication 325 MILLIGRAM(S): at 11:21

## 2025-01-05 RX ADMIN — PIPERACILLIN AND TAZOBACTAM 25 GRAM(S): 3; .375 INJECTION, POWDER, LYOPHILIZED, FOR SOLUTION INTRAVENOUS at 14:55

## 2025-01-05 RX ADMIN — PIPERACILLIN AND TAZOBACTAM 25 GRAM(S): 3; .375 INJECTION, POWDER, LYOPHILIZED, FOR SOLUTION INTRAVENOUS at 05:40

## 2025-01-05 RX ADMIN — APIXABAN 5 MILLIGRAM(S): 5 TABLET, FILM COATED ORAL at 05:40

## 2025-01-05 RX ADMIN — Medication 1 MILLIGRAM(S): at 11:20

## 2025-01-05 NOTE — CONSULT NOTE ADULT - ASSESSMENT
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 Bell

## 2025-01-05 NOTE — PROGRESS NOTE ADULT - PROBLEM SELECTOR PLAN 3
recently diagnosed with left  breast cancer in Nov 2024.   underwent 1 session of chemotherapy on Dec 5th as per patient it was 6 hour infusion after which she developed lethargy, nausea and vomiting  follows Dr. Quezada at [facility]  heme onc consult reviewed
Hemoglobin 7.5 on admission, noted with Hg  6.5  possible due to chemotherapy vs GI bleeding   no signs of active bleeding   transfused 2  unit of PRBC   monitor cbc and transfuse for hb<7  GI eval to r/o GI bleed   concider endoscopy if hemoglobin continues to drop   c/w folic acid and  oral iron   hem/onc on board
Hemoglobin 7.5 on admission, noted with Hg  6.5  possible due to chemotherapy   no signs of active bleeding   transfuse one unit of PRBC today   monitor cbc and transfuse for hb<7  consider GI eval to r/o GI bleed   start 1mg folic acid and  oral iron ferrous gluconate once a day.   hem/onc on board
Hemoglobin 7.5 on admission, noted with Hg  6.5  possible due to chemotherapy   no signs of active bleeding   transfuse one unit of PRBC today   monitor cbc and transfuse for hb<7  consider GI eval to r/o GI bleed   start 1mg folic acid and  oral iron ferrous gluconate once a day.   hem/onc on board
Hemoglobin 7.5 on admission, noted with Hg  6.5  possible due to chemotherapy vs GI bleeding   no signs of active bleeding   transfused 2  unit of PRBC   monitor cbc and transfuse for hb<7  GI eval to r/o GI bleed   concider endoscopy if hemoglobin continues to drop   c/w folic acid and  oral iron   hem/onc on board

## 2025-01-05 NOTE — DISCHARGE NOTE PROVIDER - HOSPITAL COURSE
===INCOMPLETE===  39yF, recently diagnosed with Left breast CA in nov 2024 on chemo. Patient was previously at [facility] in early december for SOB and chest pain found to have bilateral pulm emboli during the stay patient was offered thrombectomy however patient declined and later changed her mind during the stay however providers recommended the patient continue her medical management Eliquis and Rivaroxaban). Patient follows Dr. Damien STALLINGS and received her first session of chemotherapy on December 5th as per the patient the infusion lasted 6hours and since then she has been having fatigue, sob on exertion, chronic productive cough, nausea and vomiting since the chemotherapy. Patient reports subjective fevers however did not measure the temperature at home. She denies palpitations, chest pain, lightheadedness, syncopal episodes, hemoptysis, hematemesis, hematochezia, melena.  On further questioning patient disclosed she has a biopsy done on her left breast in mid november. The site now appears to have an ulcer. She notes she has chronic discharge serosanguinous occasionally bloody, malodorous.     In the ED: Temp 99.2, , /70, RR 20, Pulseox 100% on RA. Hgb 7.5 similar to previous, WBC 9k, Given Ativan for anxiety, Chest CTA showed no change in PE, given vanc, 1L Fluid bolus, admitted for L breast ulcer/sepsis    #Left breast wound  Surgery was consulted  I+D done of left breast draining copious seropurulent fluid,   Cultures sent and came back with proteus and staph  Surgery assisted with dressing changes/packing qday  Infectious Disease was consulted  Started on zosyn  Discharged on augmentin for 7 more days  Case management was consulted  Home wound care referral placed, pt understanding that visiting nurses would do wound changes 3x/week, pt would be responsible for rest  Blood cultures with no growth    #PE  No interval change in PE  Continued on eliquis  Pt asked for intervention for PE  Advised she was stable and to f/u as outpatient    #Anemia  Slight drop in hemoglobin on 1/2 to 6.5, transfused 2u PRBC    #Breast Cancer  Heme/onc was consulted   39yF, recently diagnosed with Left breast CA in nov 2024 on chemo. Patient was previously at [facility] in early december for SOB and chest pain found to have bilateral pulm emboli during the stay patient was offered thrombectomy however patient declined and later changed her mind during the stay however providers recommended the patient continue her medical management Eliquis and Rivaroxaban). Patient follows Dr. Damien STALLINGS and received her first session of chemotherapy on December 5th as per the patient the infusion lasted 6hours and since then she has been having fatigue, sob on exertion, chronic productive cough, nausea and vomiting since the chemotherapy. Patient reports subjective fevers however did not measure the temperature at home. She denies palpitations, chest pain, lightheadedness, syncopal episodes, hemoptysis, hematemesis, hematochezia, melena.  On further questioning patient disclosed she has a biopsy done on her left breast in mid november. The site now appears to have an ulcer. She notes she has chronic discharge serosanguinous occasionally bloody, malodorous.     In the ED: Temp 99.2, , /70, RR 20, Pulseox 100% on RA. Hgb 7.5 similar to previous, WBC 9k, Given Ativan for anxiety, Chest CTA showed no change in PE, given vanc, 1L Fluid bolus, admitted for L breast ulcer/sepsis    #Left breast wound  Surgery was consulted  I+D done of left breast draining copious seropurulent fluid,   Cultures sent and came back with proteus and staph  Surgery assisted with dressing changes/packing qday  Infectious Disease was consulted  Started on zosyn  Discharged on augmentin for 7 more days  Case management was consulted  Home wound care referral placed, pt understanding that visiting nurses would do wound changes 3x/week, pt would be responsible for rest  Blood cultures with no growth    #PE  No interval change in PE  Continued on eliquis  Pt asked for intervention for PE  Advised she was stable and to f/u as outpatient    #Anemia  Slight drop in hemoglobin on 1/2 to 6.5, transfused 2u PRBC    #Breast Cancer  Heme/onc was consulted   39yF, recently diagnosed with Left breast CA in nov 2024 on chemo. Patient was previously at [facility] in early december for SOB and chest pain found to have bilateral pulm emboli during the stay patient was offered thrombectomy however patient declined and later changed her mind during the stay however providers recommended the patient continue her medical management Eliquis and Rivaroxaban). Patient follows Dr. Damien STALLINGS and received her first session of chemotherapy on December 5th as per the patient the infusion lasted 6hours and since then she has been having fatigue, sob on exertion, chronic productive cough, nausea and vomiting since the chemotherapy. Patient reports subjective fevers however did not measure the temperature at home. She denies palpitations, chest pain, lightheadedness, syncopal episodes, hemoptysis, hematemesis, hematochezia, melena.  On further questioning patient disclosed she has a biopsy done on her left breast in mid november. The site now appears to have an ulcer. She notes she has chronic discharge serosanguinous occasionally bloody, malodorous.     In the ED: Temp 99.2, , /70, RR 20, Pulseox 100% on RA. Hgb 7.5 similar to previous, WBC 9k, Given Ativan for anxiety, Chest CTA showed no change in PE, given vanc, 1L Fluid bolus, admitted for L breast ulcer/sepsis    #Left breast wound  Surgery was consulted  I+D done of left breast draining copious seropurulent fluid,   Cultures sent and came back with proteus and staph  Surgery assisted with dressing changes/packing qday  Infectious Disease was consulted  Started on zosyn  Discharged on augmentin for 7 more days  Case management was consulted  Home wound care referral placed, pt understanding that visiting nurses would do wound changes 3x/week, pt would be responsible for rest  Pt was taught how to do daily dressing changes by surgery team  She verbalized she would do daily dressing changes and assessed by medical and surgery team to have capacity to do the dressing changes  Blood cultures with no growth    #PE  No interval change in PE  Continued on eliquis  Pt asked for intervention for PE  Advised she was stable and to f/u as outpatient    #Anemia  Slight drop in hemoglobin on 1/2 to 6.5, transfused 2u PRBC    #Breast Cancer  Heme/onc was consulted   39yF, recently diagnosed with Left breast CA in nov 2024 on chemo. Patient was previously at [facility] in early december for SOB and chest pain found to have bilateral pulm emboli during the stay patient was offered thrombectomy however patient declined and later changed her mind during the stay however providers recommended the patient continue her medical management Eliquis and Rivaroxaban). Patient follows Dr. Damien STALLINGS and received her first session of chemotherapy on December 5th as per the patient the infusion lasted 6hours and since then she has been having fatigue, sob on exertion, chronic productive cough, nausea and vomiting since the chemotherapy. Patient reports subjective fevers however did not measure the temperature at home. She denies palpitations, chest pain, lightheadedness, syncopal episodes, hemoptysis, hematemesis, hematochezia, melena.  On further questioning patient disclosed she has a biopsy done on her left breast in mid november. The site now appears to have an ulcer. She notes she has chronic discharge serosanguinous occasionally bloody, malodorous.     In the ED: Temp 99.2, , /70, RR 20, Pulseox 100% on RA. Hgb 7.5 similar to previous, WBC 9k, Given Ativan for anxiety, Chest CTA showed no change in PE, given vanc, 1L Fluid bolus, admitted for L breast ulcer/sepsis    #Left breast wound  Surgery was consulted  I+D done of left breast draining copious seropurulent fluid,   Cultures sent and came back with proteus and staph  Surgery assisted with dressing changes/packing qday  Infectious Disease was consulted  Started on zosyn  Discharged on augmentin for 7 more days  Case management was consulted  Home wound care referral placed, pt understanding that visiting nurses would do wound changes 3x/week, pt would be responsible for rest  Pt was taught how to do daily dressing changes by surgery team  She verbalized she would do daily dressing changes and assessed by medical and surgery team to have capacity to do the dressing changes  Also okay by Surgery at a minimum for dressing changes 3x/week  Blood cultures with no growth    #PE  No interval change in PE  Continued on eliquis  Pt asked for intervention for PE  Advised she was stable and to f/u as outpatient    #Anemia  Slight drop in hemoglobin on 1/2 to 6.5, transfused 2u PRBC    #Breast Cancer  Heme/onc was consulted

## 2025-01-05 NOTE — PROGRESS NOTE ADULT - SUBJECTIVE AND OBJECTIVE BOX
INTERVAL HPI/OVERNIGHT EVENTS: NAEO. AVSS. Patient seen and examined at bedside.  Packing changed and patient instructed on how to care for wound at home.     Vital Signs Last 24 Hrs  T(C): 36.8 (05 Jan 2025 11:05), Max: 36.9 (04 Jan 2025 15:58)  T(F): 98.2 (05 Jan 2025 11:05), Max: 98.4 (04 Jan 2025 15:58)  HR: 95 (05 Jan 2025 11:05) (80 - 95)  BP: 116/53 (05 Jan 2025 11:05) (103/53 - 116/69)  BP(mean): 70 (05 Jan 2025 11:05) (70 - 82)  RR: 18 (05 Jan 2025 11:05) (18 - 18)  SpO2: 99% (05 Jan 2025 11:05) (97% - 99%)    Parameters below as of 05 Jan 2025 11:05  Patient On (Oxygen Delivery Method): room air      I&O's Detail    aluminum hydroxide/magnesium hydroxide/simethicone Suspension 30 milliLiter(s) Oral every 4 hours PRN  piperacillin/tazobactam IVPB.. 3.375 Gram(s) IV Intermittent every 8 hours      Physical Exam:  General: Tearful  HEENT: NC/AT, trachea midline  Respiratory: Nonlabored breathing, equal chest rise b/l   Skin: No jaundice, no icterus  Left breast larger than right with significant skin thickening and erythema, inferior wound with packing in place and serous pink drainage, left axillary palpable bulky adenopathy. inferior wound packing removed, + malodor, no purulence, repacked with kerlix, pt tolerated well, covered with dry sterile gauze     Lines/Drains/Tubes:     Drains/Tubes:       Labs:                        9.0    3.58  )-----------( 464      ( 05 Jan 2025 10:25 )             28.4     01-04    142  |  110[H]  |  9   ----------------------------<  117[H]  4.1   |  25  |  0.59    Ca    8.9      04 Jan 2025 05:50          RADIOLOGY & ADDITIONAL STUDIES:

## 2025-01-05 NOTE — PROGRESS NOTE ADULT - ASSESSMENT
40 y/o F, recently diagnosed with Left breast CA in nov 2024 on chemo. Patient was previously at [facility] in early december for SOB and chest pain found to have bilateral pulm emboli during the stay patient was offered thrombectomy however patient declined, discharged on Eliquis   Patient follows Dr. Quezada  and received her first session of chemotherapy on December 5th   Presented with fever, left breast wound   Admitted for sepsis likely due to infected left breast wound   Underwent bedside I&D by surgery   Started on zosyn   ID consulted   Continued with Eliquis, pulm and cardiology follow   Pt noted with Hg 6.5.  Received 2 units of PRBC, hemoglobin improved   GI consulted, no overt GI bleeding.  PPI continued. As per GI, if there is ongoing transfusion requirements,  endoscopic intervention to be conceder  next week.

## 2025-01-05 NOTE — PROGRESS NOTE ADULT - TIME BILLING
- Review of records, telemetry, vital signs and daily labs.   - General and cardiovascular physical examination.  - Generation of cardiovascular treatment plan and completion of note .  - Coordination of care.      Patient was seen and examined by me on 1/3/25 ,interim events noted,labs and radiology studies reviewed.  John Orta MD,FACC.  [address] [place], [place] [id number].  [phone]  Availabe to call or text on [employer] TEAMS.
- Review of records, telemetry, vital signs and daily labs.   - General and cardiovascular physical examination.  - Generation of cardiovascular treatment plan and completion of note .  - Coordination of care.      Patient was seen and examined by me on 1/2/25 ,interim events noted,labs and radiology studies reviewed.  John Orta MD,FACC.  [address] [place], [place] [id number].  [phone]  Availabe to call or text on [employer] TEAMS.
- Review of records, telemetry, vital signs and daily labs.   - General and cardiovascular physical examination.  - Generation of cardiovascular treatment plan and completion of note .  - Coordination of care.      Patient was seen and examined by me on 1/4/25 ,interim events noted,labs and radiology studies reviewed.  John Orta MD,FACC.  [address] [place], [place] [id number].  [phone]  Availabe to call or text on [employer] TEAMS.
Chart review, patient examination and documentation.
- Review of records, telemetry, vital signs and daily labs.   - General and cardiovascular physical examination.  - Generation of cardiovascular treatment plan and completion of note .  - Coordination of care.      Patient was seen and examined by me on 1/5/25 ,interim events noted,labs and radiology studies reviewed.  John Orta MD,FACC.  [address] [place], [place] [id number].  [phone]  Availabe to call or text on [employer] TEAMS.

## 2025-01-05 NOTE — DISCHARGE NOTE PROVIDER - PROVIDER TOKENS
PROVIDER:[TOKEN:[[id number]],FOLLOWUP:[2 weeks]] PROVIDER:[TOKEN:[[id number]],FOLLOWUP:[2 weeks]],PROVIDER:[TOKEN:[[id number]],FOLLOWUP:[1 week]]

## 2025-01-05 NOTE — PROGRESS NOTE ADULT - PROBLEM SELECTOR PLAN 4
recently diagnosed with left  breast cancer in Nov 2024.   underwent 1 session of chemotherapy on Dec 5th as per patient it was 6 hour infusion   follows Dr. Quezada at [facility]  Consulted Dr. Aragon
recently diagnosed with left  breast cancer in Nov 2024.   underwent 1 session of chemotherapy on Dec 5th as per patient it was 6 hour infusion   follows Dr. Quezada at [facility]  Consulted Dr. Aragon
recently diagnosed with left  breast cancer in Nov 2024.   underwent 1 session of chemotherapy on Dec 5th as per patient it was 6 hour infusion   follows Dr. Quezada at [facility]  Consulted Dr. Aragon
At [facility] diagnosed with bilateral PEs, patient refused thrombectomy initially hence started on medical management for PE.  cont ac   incentive spirometry   pulm eval
recently diagnosed with left  breast cancer in Nov 2024.   underwent 1 session of chemotherapy on Dec 5th as per patient it was 6 hour infusion   follows Dr. Quezada at [facility]  Consulted Dr. Aragon
recently diagnosed with left  breast cancer in Nov 2024.   underwent 1 session of chemotherapy on Dec 5th as per patient it was 6 hour infusion   follows Dr. Quezada at [facility]  Consulted Dr. Aragon
recently diagnosed with left  breast cancer in Nov 2024.   underwent 1 session of chemotherapy on Dec 5th as per patient it was 6 hour infusion   follows Dr. Quezada at [facility]  Consulted Dr. Aragon

## 2025-01-05 NOTE — PROGRESS NOTE ADULT - PROBLEM SELECTOR PROBLEM 2
Ulcer of skin of breast

## 2025-01-05 NOTE — DISCHARGE NOTE PROVIDER - CARE PROVIDERS DIRECT ADDRESSES
,[email or web address]

## 2025-01-05 NOTE — DISCHARGE NOTE PROVIDER - EXTENDED VTE YES NO FOR MLM ENOXAPARIN
Chief complaint:   Chief Complaint   Patient presents with    URI     Runny nose, congestion and cough for 3 days and 2 days of fever which have resolved at this point        Vitals:  Visit Vitals  /70   Pulse 103   Temp 97.3 °F (36.3 °C) (Temporal)   Resp 20   Wt 44.5 kg (98 lb 3 oz)   SpO2 97%       HISTORY OF PRESENT ILLNESS     The patient is a(n) 7 year old male that presents to urgent care with his mother for nasal congestion, rhinorrhea, productive cough for the past 3 days.  Tactile fever initially which has resolved.  Decreased appetite, normal fluid intake and urine output.  No history of respiratory disease, never needed inhalers or breathing treatments in the past.  No known sick contacts.      URI  This is a new problem. The current episode started in the past 7 days. The problem occurs constantly. The problem has been unchanged. Associated symptoms include congestion and coughing. Pertinent negatives include no abdominal pain, chest pain, fatigue, fever, headaches, myalgias, nausea, rash, sore throat or vomiting. Nothing aggravates the symptoms. He has tried nothing for the symptoms.       Other significant problems:  Patient Active Problem List    Diagnosis Date Noted    Adjustment disorder with mixed disturbance of emotions and conduct 01/05/2021     Priority: Low    Functional constipation 07/12/2018     Priority: Low       PAST MEDICAL, FAMILY AND SOCIAL HISTORY     Medications:  Current Outpatient Medications   Medication Sig Dispense Refill    loratadine (CLARITIN) 5 MG/5ML oral solution Take 5 mLs by mouth daily. (Patient not taking: Reported on 11/12/2024) 150 mL 1    polyethylene glycol (MIRALAX) 17 GM/SCOOP powder GIVE \"KM\" 17 GRAMS BY MOUTH DAILY. STIR AND DISSOLVE POWDER IN ANY FOUR TO EIGHT OUNCES OF BEVERAGE, THEN DRINK. 238 g 0    Multiple Vitamin (MULTIVITAMIN PO)  (Patient not taking: Reported on 11/12/2024)       No current facility-administered medications for this visit.        Allergies:  ALLERGIES:  No Known Allergies    Past Medical  History/Surgeries:  No past medical history on file.    No past surgical history on file.    Family History:  No family history on file.    Social History:  Social History     Tobacco Use    Smoking status: Not on file    Smokeless tobacco: Not on file   Substance Use Topics    Alcohol use: Not on file       REVIEW OF SYSTEMS     Review of Systems   Constitutional:  Positive for appetite change. Negative for fatigue and fever.   HENT:  Positive for congestion and rhinorrhea. Negative for ear pain and sore throat.    Eyes:  Negative for discharge and redness.   Respiratory:  Positive for cough. Negative for shortness of breath and wheezing.    Cardiovascular:  Negative for chest pain.   Gastrointestinal:  Negative for abdominal pain, diarrhea, nausea and vomiting.   Genitourinary:  Negative for decreased urine volume.   Musculoskeletal:  Negative for myalgias.   Skin:  Negative for rash.   Neurological:  Negative for headaches.       PHYSICAL EXAM     Physical Exam  Vitals and nursing note reviewed.   Constitutional:       General: He is active. He is not in acute distress.     Appearance: He is well-developed. He is not ill-appearing or diaphoretic.   HENT:      Right Ear: Tympanic membrane normal. No drainage, swelling or tenderness.      Left Ear: Tympanic membrane normal. No drainage, swelling or tenderness.      Nose: Congestion and rhinorrhea present.      Right Turbinates: Swollen.      Left Turbinates: Swollen.      Right Sinus: No maxillary sinus tenderness or frontal sinus tenderness.      Left Sinus: No maxillary sinus tenderness or frontal sinus tenderness.      Mouth/Throat:      Mouth: Mucous membranes are moist. No oral lesions.      Pharynx: Oropharynx is clear. Uvula midline. No posterior oropharyngeal erythema.      Tonsils: No tonsillar exudate.      Neck: Normal range of motion and neck supple.   Eyes:      General:         Right  eye: No discharge.         Left eye: No discharge.      Conjunctiva/sclera: Conjunctivae normal.      Pupils: Pupils are equal, round, and reactive to light.   Cardiovascular:      Rate and Rhythm: Normal rate and regular rhythm.      Heart sounds: Normal heart sounds.   Pulmonary:      Effort: Pulmonary effort is normal. No respiratory distress or retractions.      Breath sounds: Normal air entry. No decreased air movement. Examination of the right-lower field reveals rhonchi. Examination of the left-lower field reveals rhonchi. Rhonchi (cleared with cough) present. No decreased breath sounds, wheezing or rales.   Abdominal:      General: Bowel sounds are normal.      Palpations: Abdomen is soft.      Tenderness: There is no abdominal tenderness.   Musculoskeletal:         General: No tenderness. Normal range of motion.   Lymphadenopathy:      Head:      Right side of head: No tonsillar adenopathy.      Left side of head: No tonsillar adenopathy.      Cervical: Cervical adenopathy present.      Right cervical: Posterior cervical adenopathy present.      Left cervical: Posterior cervical adenopathy present.      Upper Body:      Right upper body: No supraclavicular adenopathy.      Left upper body: No supraclavicular adenopathy.   Skin:     General: Skin is warm and dry.      Findings: No rash.   Neurological:      General: No focal deficit present.      Mental Status: He is alert. Mental status is at baseline.         ASSESSMENT/PLAN     Diagnoses and all orders for this visit:  Viral URI with cough    No clinical signs of bacterial sinusitis, otitis media, pneumonia, etc.  Discussed likely viral nature of patient's illness that is self-limiting and typically improves within 7-10 days with supportive therapy including: Tylenol or ibuprofen as directed, over the counter cough and cold medications (Sudafed, Robitussin DM as directed), humidification, frequent nose blowing, plenty of fluids.    Follow up with PCP if  symptoms do not improve with above treatment within 1 week or sooner for worsening symptoms.    All questions answered and patient (parent if applicable) in agreement with treatment and discharge plan. Patient appropriately stable at time of discharge from urgent care clinic. The provisional diagnosis that the patient is discharged with today was based on the history taken, presenting symptoms, physical exam, and/or any ancillary testing. Patient (parent if applicable) states understanding that often times the diagnosis can change. If new symptoms occur or worsen, patient should seek immediate medical attention for re-evaluation. Follow up with Primary Care Provider as discussed in the after visit summary.      See the patient discharge instructions section for additional instructions, follow-up plans and/or ER precautions discussed with the patient.    Collaborating Physician Dr. Ronaldo Naranjo MD.   ,

## 2025-01-05 NOTE — DISCHARGE NOTE NURSING/CASE MANAGEMENT/SOCIAL WORK - FINANCIAL ASSISTANCE
[facility] provides services at a reduced cost to those who are determined to be eligible through [facility]’s financial assistance program. Information regarding [facility]’s financial assistance program can be found by going to [email or web address] or by calling [phone].

## 2025-01-05 NOTE — DISCHARGE NOTE PROVIDER - CARE PROVIDER_API CALL
Jonn Berman  Surgery  [address], [address]  [place] [address]  Phone: [phone]  Fax: [phone]  Follow Up Time: 2 weeks   Jonn Berman  Surgery  [address], [address]  [place] [address]  Phone: [phone]  Fax: [phone]  Follow Up Time: 2 weeks    Nuvia Quezada  Medical Oncology  [address] [place] [id number]  Phone: [phone]  Fax: [phone]  Follow Up Time: 1 week

## 2025-01-05 NOTE — PROGRESS NOTE ADULT - SUBJECTIVE AND OBJECTIVE BOX
PATIENT SEEN AND EXAMINED BY ZAID LORD M.D. ON :- 1/5/25  DATE OF SERVICE: 1/5/25            Interim events noted,Labs ,Radiological studies and Cardiology tests reviewed .    Patient is a 39y old  Female who presents with a chief complaint of Left breast wound, anemia (05 Jan 2025 10:42)      HPI:  Patient is a 39yF, recently diagnosed with Left breast CA in nov 2024 on chemo. Patient was previously at [facility] in early december for SOB and chest pain found to have bilateral pulm emboli during the stay patient was offered thrombectomy however patient declined and later changed her mind during the stay however providers recommended the patient continue her medical management Eliquis and Rivaroxaban). Patient follows Dr. Damien STALLINGS and received her first session of chemotherapy on December 5th as per the patient the infusion lasted 6hours and since then she has been having fatigue, sob on exertion, chronic productive cough, nausea and vomiting since the chemotherapy. Patient reports subjective fevers however did not measure the temperature at home. She denies palpitations, chest pain, lightheadedness, syncopal episodes, hemoptysis, hematemesis, hematochezia, melena.  On further questioning patient disclosed she has a biopsy done on her left breast in mid november. The site now appears to have an ulcer. She notes she has chronic discharge serosanguinous occasionally bloody, malodorous.    (31 Dec 2024 12:50)      PAST MEDICAL & SURGICAL HISTORY:  Breast cancer      Pulmonary embolism      No significant past surgical history          PREVIOUS DIAGNOSTIC TESTING:      ECHO  FINDINGS:    STRESS  FINDINGS:    CATHETERIZATION  FINDINGS:    MEDICATIONS  (STANDING):  apixaban 5 milliGRAM(s) Oral every 12 hours  ferrous    sulfate 325 milliGRAM(s) Oral daily  folic acid 1 milliGRAM(s) Oral daily  piperacillin/tazobactam IVPB.. 3.375 Gram(s) IV Intermittent every 8 hours    MEDICATIONS  (PRN):  acetaminophen     Tablet .. 650 milliGRAM(s) Oral every 6 hours PRN Temp greater or equal to 38C (100.4F), Mild Pain (1 - 3)  acetaminophen   IVPB .. 1000 milliGRAM(s) IV Intermittent once PRN Temp greater or equal to 38C (100.4F), Mild Pain (1 - 3)  aluminum hydroxide/magnesium hydroxide/simethicone Suspension 30 milliLiter(s) Oral every 4 hours PRN Dyspepsia  melatonin 3 milliGRAM(s) Oral at bedtime PRN Insomnia  ondansetron Injectable 4 milliGRAM(s) IV Push every 8 hours PRN Nausea and/or Vomiting      FAMILY HISTORY:  No pertinent family history in first degree relatives        SOCIAL HISTORY:    CIGARETTES:    ALCOHOL:    REVIEW OF SYSTEMS:  CONSTITUTIONAL: No fever, weight loss, or fatigue  EYES: No eye pain, visual disturbances, or discharge  ENMT:  No difficulty hearing, tinnitus, vertigo; No sinus or throat pain  NECK: No pain or stiffness  RESPIRATORY: No cough, wheezing, chills or hemoptysis; No shortness of breath  CARDIOVASCULAR: No chest pain, palpitations, dizziness, or leg swelling  GASTROINTESTINAL: No abdominal or epigastric pain. No nausea, vomiting, or hematemesis; No diarrhea or constipation. No melena or hematochezia.  GENITOURINARY: No dysuria, frequency, hematuria, or incontinence  NEUROLOGICAL: No headaches, memory loss, loss of strength, numbness, or tremors  SKIN: No itching, burning, rashes, or lesions   LYMPH NODES: No enlarged glands  ENDOCRINE: No heat or cold intolerance; No hair loss  MUSCULOSKELETAL: No joint pain or swelling; No muscle, back, or extremity pain  PSYCHIATRIC: No depression, anxiety, mood swings, or difficulty sleeping  HEME/LYMPH: No easy bruising, or bleeding gums  ALLERY AND IMMUNOLOGIC: No hives or eczema    Vital Signs Last 24 Hrs  T(C): 36.8 (05 Jan 2025 20:12), Max: 37 (05 Jan 2025 16:13)  T(F): 98.2 (05 Jan 2025 20:12), Max: 98.6 (05 Jan 2025 16:13)  HR: 93 (05 Jan 2025 20:12) (80 - 95)  BP: 107/53 (05 Jan 2025 20:12) (107/53 - 116/69)  BP(mean): 69 (05 Jan 2025 20:12) (69 - 82)  RR: 18 (05 Jan 2025 20:12) (18 - 18)  SpO2: 98% (05 Jan 2025 20:12) (97% - 99%)    Parameters below as of 05 Jan 2025 20:12  Patient On (Oxygen Delivery Method): room air          PHYSICAL EXAM:  GENERAL: NAD, well-groomed, well-developed  HEAD:  Atraumatic, Normocephalic  EYES: EOMI, PERRLA, conjunctiva and sclera clear  ENMT: No tonsillar erythema, exudates, or enlargement; Moist mucous membranes, Good dentition, No lesions  NECK: Supple, No JVD, Normal thyroid  NERVOUS SYSTEM:  Alert & Oriented X3, Good concentration; Motor Strength 5/5 B/L upper and lower extremities; DTRs 2+ intact and symmetric  CHEST/LUNG: Clear to percussion bilaterally; No rales, rhonchi, wheezing, or rubs  HEART: Regular rate and rhythm; No murmurs, rubs, or gallops  ABDOMEN: Soft, Nontender, Nondistended; Bowel sounds present  EXTREMITIES:  2+ Peripheral Pulses, No clubbing, cyanosis, or edema  LYMPH: No lymphadenopathy noted  SKIN: No rashes or lesions      INTERPRETATION OF TELEMETRY:    ECG:    [facility]:     LABS:                        9.0    3.58  )-----------( 464      ( 05 Jan 2025 10:25 )             28.4     01-04    142  |  110[H]  |  9   ----------------------------<  117[H]  4.1   |  25  |  0.59    Ca    8.9      04 Jan 2025 05:50            Urinalysis Basic - ( 04 Jan 2025 05:50 )    Color: x / Appearance: x / SG: x / pH: x  Gluc: 117 mg/dL / Ketone: x  / Bili: x / Urobili: x   Blood: x / Protein: x / Nitrite: x   Leuk Esterase: x / RBC: x / WBC x   Sq Epi: x / Non Sq Epi: x / Bacteria: x      Lipid Panel:   I&O's Summary      RADIOLOGY & ADDITIONAL STUDIES:    < from: TTE W or WO Ultrasound Enhancing Agent (12.12.24 @ 18:31) >  CONCLUSIONS:      1. Technically difficulty study.   2. Left ventricular endocardium is not well visualized; however, the left ventricular systolic function appears grossly normal.   3. There is poor visualization of all the endocardial borders to determine the presence of wall motion abnormalities. Probably normal.   4. The right ventricle is not well visualized. Based on visual assessment, the right ventricle appears normal in size and right ventricular systolic function is normal.   5. Unable to exclude the presence of right atrium, superior or inferior vena cava mass.   6. No significant valvular disease within study limitations.   7. No prior echocardiogram is available for comparison.    < end of copied text >

## 2025-01-05 NOTE — CHART NOTE - NSCHARTNOTEFT_GEN_A_CORE
For wound care agency.    Left breast wound 2.5cm x 2.5cm, 2 cm deep.  Daily dressing changes.  Remove all gauze and packing, cleansing with saline as needed.  Repack with joel wrap  Cover with sterile gauze.  Cover with abd pad and secure with tape.    Mateo Marie NP

## 2025-01-05 NOTE — PROGRESS NOTE ADULT - PROBLEM SELECTOR PROBLEM 4
Breast cancer
Pulmonary embolism
Breast cancer

## 2025-01-05 NOTE — DISCHARGE NOTE PROVIDER - NSDCFUADDINST_GEN_ALL_CORE_FT
Daily Wound Care Dressing Changes:  Remove all joel packing and gauze from left breast wound  Clean area gently with saline  Repack with joel wrap until wound is full, cut excess joel  Cover with sterile gauze  Cover with with abd pad or sterile gauze and secure with tape

## 2025-01-05 NOTE — DISCHARGE NOTE PROVIDER - NSDCCPCAREPLAN_GEN_ALL_CORE_FT
PRINCIPAL DISCHARGE DIAGNOSIS  Diagnosis: Left breast abscess  Assessment and Plan of Treatment: You came in with a left breast wound that was draining fluid.  You were seen by the Surgery team and they performed an Incision and Drainage to help facilitate drainage of the infection.  You were placed on antibiotics and followed by the Infectious Disease Team.  Wound cultures taken from the left breast wound grew bacterias Proteus and Staph Aureus and you were recommended by the Infectious Disease Team to complete 7 more days of oral antibitiocs with Augmentin.  You will need to do daily dressing changes of the left breast abscess.  Surgery team showed you how to do this at bedside and you agreed to do daily dressing changes with assistance of home care 3 times a week.  You were given wound care supplies to help you with the transition to home.      SECONDARY DISCHARGE DIAGNOSES  Diagnosis: Anemia in neoplastic disease  Assessment and Plan of Treatment: Your blood count was low in the hospital, this may be from multiple reasons being your underlying cancer, being on blood thinners, and you have mild bleeding from your breast wound.  You were transfused 2 units of blood and your blood count responded appropriately.  You were seen by the hematology/oncology team.  Continue to follow-up with your Oncology team as an outpatient.    Diagnosis: Pulmonary embolism  Assessment and Plan of Treatment: You came in with shortness of breath and fatigue.  CT scan of the chest was done given your history of lung clots.  Your clot in the right lung artery looks the same.  There were no new clots seen on imaging.  You were continued on your blood thinner.

## 2025-01-05 NOTE — PROGRESS NOTE ADULT - PROBLEM SELECTOR PLAN 2
ulcer present on left lateral lower quadrant of breast since mid november post biopsy  since then ulcer present and chronically draining: malodorous serosangionous bloody discharge daily  Left breast enlarged and appears to have a peu d'orange appearance compared to right breast  surgery consulted for possible intervention
sp I&D of left breast lesion at bedside   continue wound care  wound culture + proteus and sath aureus   continue zosyn

## 2025-01-05 NOTE — PROGRESS NOTE ADULT - SUBJECTIVE AND OBJECTIVE BOX
Patient is a 39y old  Female who presents with a chief complaint of Sepsis with Breast wound culture + proteus and staph aureus .      INTERVAL HPI/OVERNIGHT EVENTS: pt seen and examined    MEDICATIONS  (STANDING):  apixaban 5 milliGRAM(s) Oral every 12 hours  ferrous    sulfate 325 milliGRAM(s) Oral daily  folic acid 1 milliGRAM(s) Oral daily  piperacillin/tazobactam IVPB.. 3.375 Gram(s) IV Intermittent every 8 hours    MEDICATIONS  (PRN):  acetaminophen     Tablet .. 650 milliGRAM(s) Oral every 6 hours PRN Temp greater or equal to 38C (100.4F), Mild Pain (1 - 3)  acetaminophen   IVPB .. 1000 milliGRAM(s) IV Intermittent once PRN Temp greater or equal to 38C (100.4F), Mild Pain (1 - 3)  aluminum hydroxide/magnesium hydroxide/simethicone Suspension 30 milliLiter(s) Oral every 4 hours PRN Dyspepsia  melatonin 3 milliGRAM(s) Oral at bedtime PRN Insomnia  ondansetron Injectable 4 milliGRAM(s) IV Push every 8 hours PRN Nausea and/or Vomiting      __________________________________________________  REVIEW OF SYSTEMS:    CONSTITUTIONAL: No fever,   RESPIRATORY: No cough; No shortness of breath  CARDIOVASCULAR: No chest pain, no palpitations  GASTROINTESTINAL: No pain. No nausea or vomiting; No diarrhea   NEUROLOGICAL: No headache or numbness, no tremors  MUSCULOSKELETAL: No joint pain, no muscle pain  GENITOURINARY: no dysuria, no frequency, no hesitancy  PSYCHIATRY: no depression , + anxiety      Vital Signs Last 24 Hrs  T(C): 36.8 (01-05-25 @ 20:12), Max: 37 (01-05-25 @ 16:13)  T(F): 98.2 (01-05-25 @ 20:12), Max: 98.6 (01-05-25 @ 16:13)  HR: 93 (01-05-25 @ 20:12) (80 - 95)  BP: 107/53 (01-05-25 @ 20:12) (107/53 - 116/69)  BP(mean): 69 (01-05-25 @ 20:12) (69 - 82)  RR: 18 (01-05-25 @ 20:12) (18 - 18)  SpO2: 98% (01-05-25 @ 20:12) (97% - 99%)      ________________________________________________  PHYSICAL EXAM:  GENERAL: NAD  CHEST/LUNG: dec breath sounds at bases  HEART: S1 S2 +  ABDOMEN: Soft, Nontender, Nondistended; Bowel sounds present  EXTREMITIES: no cyanosis; + LE swelling; no calf tenderness  SKIN: left breast inferior and lateral wounds, + dressings intact   NERVOUS SYSTEM:  Awake and alert;   _________________________________________________  LABS:  01-04    142  |  110[H]  |  9   ----------------------------<  117[H]  4.1   |  25  |  0.59    Ca    8.9      04 Jan 2025 05:50      Creatinine Trend: 0.59 <--, 0.62 <--, 0.71 <--, 0.85 <--, 1.17 <--                        9.0    3.58  )-----------( 464      ( 05 Jan 2025 10:25 )             28.4     Urine Studies:  Urinalysis Basic - ( 04 Jan 2025 05:50 )    Color:  / Appearance:  / SG:  / pH:   Gluc: 117 mg/dL / Ketone:   / Bili:  / Urobili:    Blood:  / Protein:  / Nitrite:    Leuk Esterase:  / RBC:  / WBC    Sq Epi:  / Non Sq Epi:  / Bacteria:                                     RADIOLOGY & ADDITIONAL TESTS:    Imaging Personally Reviewed:  YES/NO    Consultant(s) Notes Reviewed:   YES/ No    Care Discussed with Consultants :     Plan of care was discussed with patient and /or primary care giver; all questions and concerns were addressed and care was aligned with patient's wishes.

## 2025-01-05 NOTE — PROGRESS NOTE ADULT - PROBLEM SELECTOR PLAN 1
s/p biopsy of left breast in mid november 2024, since there is chronic drainage and open wound.   serosanguinous, malodorous ulcer   blood culture negative   sp I&D of left breast wound   wound culture + proteus and staph aureus   ID Dr. Lizarraga   continue with Adwoa
s/p biopsy of left breast in mid november 2024, since there is chronic drainage and open wound.   serosanguinous, malodorous ulcer   f/u blood cultures, urine cultures  sp I&D of left breast wound   wound culture + proteus and staph aureus   ID Dr. Lizarraga   continue with Adwoa
s/p biopsy of left breast in mid november 2024, since there is chronic drainage and open wound.   serosanguinous, malodorous ulcer   blood culture negative   sp I&D of left breast wound   wound culture + proteus and staph aureus   ID Dr. Lizarraga   continue with Adwoa
p/w fever, HR >90, tachypneic, lactate wnl  s/p biopsy of left breast in mid november 2024, since there is chronic drainage and open wound.   serosanguinous, malodorous ulcer   surgery f/u   ID eval  f/u cx
s/p biopsy of left breast in mid november 2024, since there is chronic drainage and open wound.   serosanguinous, malodorous ulcer   f/u blood cultures, urine cultures  sp I&D of left breast wound   wound culture + proteus and staph aureus   ID Dr. Lizarraga   continue with Adwoa

## 2025-01-05 NOTE — DISCHARGE NOTE PROVIDER - NSDCCPGOAL_GEN_ALL_CORE_FT
Have You Had Dysport Before?: has had dysport When Was Your Last Dysport Treatment?: 10/13/2021 To get better and follow your care plan as instructed.

## 2025-01-05 NOTE — PROGRESS NOTE ADULT - PROVIDER SPECIALTY LIST ADULT
Cardiology
Infectious Disease
Internal Medicine
Internal Medicine
Surgery
Surgery
Cardiology
Heme/Onc
Heme/Onc
Pulmonology
Surgery
Cardiology
Cardiology
Internal Medicine

## 2025-01-05 NOTE — DISCHARGE NOTE PROVIDER - NSDCMRMEDTOKEN_GEN_ALL_CORE_FT
Eliquis 5 mg oral tablet: 1 tab(s) orally 2 times a day  folic acid 1 mg oral tablet: 1 tab(s) orally once a day   amoxicillin-clavulanate 875 mg-125 mg oral tablet: 875 milligram(s) orally 2 times a day  Eliquis 5 mg oral tablet: 1 tab(s) orally 2 times a day  folic acid 1 mg oral tablet: 1 tab(s) orally once a day

## 2025-01-05 NOTE — CONSULT NOTE ADULT - CONSULT REQUESTED DATE/TIME
03-Jan-2025 10:21
05-Jan-2025 14:52
01-Jan-2025 07:31
01-Jan-2025 17:41
31-Dec-2024 17:45
01-Jan-2025 18:17
01-Jan-2025 22:22

## 2025-01-05 NOTE — CONSULT NOTE ADULT - CONSULT REASON
L breast ulceration/mastitis
Bilateral pulmonary emboli
breast cancer
GI bleed
PE
left breast abscess
sepsis

## 2025-01-05 NOTE — DISCHARGE NOTE NURSING/CASE MANAGEMENT/SOCIAL WORK - PATIENT PORTAL LINK FT
You can access the FollowMyHealth Patient Portal offered by [facility] by registering at the following website: [email or web address]. By joining [facility]’s FollowMyHealth portal, you will also be able to view your health information using other applications (apps) compatible with our system.

## 2025-01-05 NOTE — PROGRESS NOTE ADULT - ASSESSMENT
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 VNS reportedly unable provide services for daily packing changes; patient was educated on how to change packing herself, however she is hesitant to do so at home and expressed that she does not have a family member or friend who is willing to or able to help her with wound care. If the patient is unable to change packing daily, it is acceptable to have [facility] change 3x/week per discussion with attending.   - Patient instructed to follow up with breast surgeon Dr. Berman for wound check upon discharge  - Abx per ID recs  - AC per primary, monitor cbc transfuse as needed

## 2025-01-05 NOTE — PROGRESS NOTE ADULT - ASSESSMENT
Patient is a 39yF, recently diagnosed with Left breast CA in nov 2024 on chemo. Recently in [facility] for bilateral PE refused thrombectomy initially, later agreeable however providers recommended medical management with eliquis. Patient presented to the ED with continued cough and sob on exertion which started when the PE was discovered. In the ED febrile Tmax 101, tachycardic. On exam patient has a left breast ulcer which has been present since the biopsy was performed in november. Patient is being admitted for Sepsis 2/2 Left breast ulcer and management of PE.     # Sepsis 2/2 wound  #  Ulcer of skin of breast.   # PE  # anemia  - surgery f/u  - pulm f/u  - GI f/u   - transfuse  as needed

## 2025-01-05 NOTE — CONSULT NOTE ADULT - SUBJECTIVE AND OBJECTIVE BOX
HPI:  Patient is a 39yF, recently diagnosed with Left breast CA in nov 2024 on chemo. Patient was previously at [facility] in early december for SOB and chest pain found to have bilateral pulm emboli during the stay patient was offered thrombectomy however patient declined at the time and was treated with AC. Patient follows Dr. Damein STALLINGS and received her first session of chemotherapy on December 5th as per the patient the infusion lasted 6hours and since then she has been having fatigue, sob on exertion, chronic productive cough, nausea and vomiting since the chemotherapy. Presented to [facility] for breast wound, s/p L breast I&D 12/31/24.     Vascular consulted for h/o b/l PE, patient currently denies SOB, CP, resting comfortably on RA.   Requesting intervention for PE             Vital Signs Last 24 Hrs  T(C): 36.8 (05 Jan 2025 11:05), Max: 36.9 (04 Jan 2025 15:58)  T(F): 98.2 (05 Jan 2025 11:05), Max: 98.4 (04 Jan 2025 15:58)  HR: 95 (05 Jan 2025 11:05) (80 - 95)  BP: 116/53 (05 Jan 2025 11:05) (103/53 - 116/69)  BP(mean): 70 (05 Jan 2025 11:05) (70 - 82)  RR: 18 (05 Jan 2025 11:05) (18 - 18)  SpO2: 99% (05 Jan 2025 11:05) (97% - 99%)    Parameters below as of 05 Jan 2025 11:05  Patient On (Oxygen Delivery Method): room air      I&O's Detail    aluminum hydroxide/magnesium hydroxide/simethicone Suspension 30 milliLiter(s) Oral every 4 hours PRN  piperacillin/tazobactam IVPB.. 3.375 Gram(s) IV Intermittent every 8 hours      Physical Exam:  General: AAOx3, No acute distress  HEENT: NC/AT, trachea midline  Respiratory: Nonlabored breathing, equal chest rise b/l   Skin: No jaundice, no icterus  Abdomen: soft,  nondistended, nontender  Extremities: non edematous, no calf pain bilaterally    Lines/Drains/Tubes:     Drains/Tubes:       Labs:                        9.0    3.58  )-----------( 464      ( 05 Jan 2025 10:25 )             28.4     01-04    142  |  110[H]  |  9   ----------------------------<  117[H]  4.1   |  25  |  0.59    Ca    8.9      04 Jan 2025 05:50          RADIOLOGY & ADDITIONAL STUDIES:   HPI:  Patient is a 39yF, recently diagnosed with Left breast CA in nov 2024 on chemo. Patient was previously at [facility] in early december for SOB and chest pain found to have bilateral pulm emboli with clot in right atrium  during the stay patient was offered thrombectomy/MIKEY however patient declined at the time and opted for medical/conservative treatment of PE.  Patient follows Dr. Damien STALLINGS and received her first session of chemotherapy on December 5th as per the patient the infusion lasted 6hours and since then she has been having fatigue, sob on exertion, chronic productive cough, nausea and vomiting since the chemotherapy. Presented to [facility] for breast wound, s/p L breast I&D 12/31/24.     Vascular consulted for h/o b/l PE, patient currently denies SOB, CP, resting comfortably on RA. + cough.   Patient is requesting thrombectomy but per chart review patient declined intervention at [facility]; explained that we do not have vascular cardiology team / PERT at [facility] and that patient is being treated with AC  currently.     Vital Signs Last 24 Hrs  T(C): 36.8 (05 Jan 2025 11:05), Max: 36.9 (04 Jan 2025 15:58)  T(F): 98.2 (05 Jan 2025 11:05), Max: 98.4 (04 Jan 2025 15:58)  HR: 95 (05 Jan 2025 11:05) (80 - 95)  BP: 116/53 (05 Jan 2025 11:05) (103/53 - 116/69)  BP(mean): 70 (05 Jan 2025 11:05) (70 - 82)  RR: 18 (05 Jan 2025 11:05) (18 - 18)  SpO2: 99% (05 Jan 2025 11:05) (97% - 99%)    Parameters below as of 05 Jan 2025 11:05  Patient On (Oxygen Delivery Method): room air      I&O's Detail    aluminum hydroxide/magnesium hydroxide/simethicone Suspension 30 milliLiter(s) Oral every 4 hours PRN  piperacillin/tazobactam IVPB.. 3.375 Gram(s) IV Intermittent every 8 hours      Physical Exam:  General: AAOx3, No acute distress  HEENT: NC/AT, trachea midline  Respiratory: Nonlabored breathing, equal chest rise b/l   Skin: No jaundice, no icterus  Abdomen: soft,  nondistended, nontender  Extremities: non edematous, no calf pain bilaterally    Lines/Drains/Tubes:     Drains/Tubes:       Labs:                        9.0    3.58  )-----------( 464      ( 05 Jan 2025 10:25 )             28.4     01-04    142  |  110[H]  |  9   ----------------------------<  117[H]  4.1   |  25  |  0.59    Ca    8.9      04 Jan 2025 05:50          RADIOLOGY & ADDITIONAL STUDIES:   HPI:  Patient is a 39yF, recently diagnosed with Left breast CA in nov 2024 on chemo. Patient was previously at [facility] in early december for SOB and chest pain found to have bilateral pulm emboli with clot in right atrium  during the stay patient was offered thrombectomy/MIKEY however patient declined at the time and opted for medical/conservative treatment of PE.  Patient follows Dr. Damien STALLINGS and received her first session of chemotherapy on December 5th as per the patient the infusion lasted 6hours and since then she has been having fatigue, sob on exertion, chronic productive cough, nausea and vomiting since the chemotherapy. Presented to [facility] for breast wound, s/p L breast I&D 12/31/24.     Vascular consulted for h/o b/l PE, patient currently denies SOB, CP, resting comfortably on RA. + cough.  CTA with stable PE  Patient is requesting thrombectomy but per chart review patient declined intervention at [facility]; explained that we do not have vascular cardiology team / PERT at [facility] and that patient is being treated with AC  currently.     Vital Signs Last 24 Hrs  T(C): 36.8 (05 Jan 2025 11:05), Max: 36.9 (04 Jan 2025 15:58)  T(F): 98.2 (05 Jan 2025 11:05), Max: 98.4 (04 Jan 2025 15:58)  HR: 95 (05 Jan 2025 11:05) (80 - 95)  BP: 116/53 (05 Jan 2025 11:05) (103/53 - 116/69)  BP(mean): 70 (05 Jan 2025 11:05) (70 - 82)  RR: 18 (05 Jan 2025 11:05) (18 - 18)  SpO2: 99% (05 Jan 2025 11:05) (97% - 99%)    Parameters below as of 05 Jan 2025 11:05  Patient On (Oxygen Delivery Method): room air      I&O's Detail    aluminum hydroxide/magnesium hydroxide/simethicone Suspension 30 milliLiter(s) Oral every 4 hours PRN  piperacillin/tazobactam IVPB.. 3.375 Gram(s) IV Intermittent every 8 hours      Physical Exam:  General: AAOx3, No acute distress  HEENT: NC/AT, trachea midline  Respiratory: Nonlabored breathing, equal chest rise b/l   Skin: No jaundice, no icterus  Abdomen: soft,  nondistended, nontender  Extremities: non edematous, no calf pain bilaterally    Lines/Drains/Tubes:     Drains/Tubes:       Labs:                        9.0    3.58  )-----------( 464      ( 05 Jan 2025 10:25 )             28.4     01-04    142  |  110[H]  |  9   ----------------------------<  117[H]  4.1   |  25  |  0.59    Ca    8.9      04 Jan 2025 05:50          RADIOLOGY & ADDITIONAL STUDIES:  < from: CT Angio Chest PE Protocol w/ IV Cont (12.31.24 @ 08:34) >    ACC: [id number] EXAM:  CT ANGIO CHEST PULM [facility], [facility]   ORDERED BY: LANI TURNER     PROCEDURE DATE:  12/31/2024          INTERPRETATION:  Clinical information: Chest pain. History of metastatic   breast cancer. Evaluate for pulmonary embolus. Exam is compared to   previous study of 12/12/2024.    CT angiogram of the chest was obtained following administration of   intravenous contrast. Approximately 60 cc of Omnipaque 350 was   administered in 40 cc was discarded. Coronal, sagittal and MIP images   were submitted for review.    Once again, a large mass is noted within the left breast. This is   incompletely imaged on this exam. Marked hazy changes within the left   breast as well as thickening of the skin overlying the left breast are   noted. Multiple lymph nodes are present in the left axilla/left   subpectoral region.    No hilar or mediastinal adenopathy is noted.    Heart is normal in size. No pericardial effusion is noted. Once again,   filling defect is noted within the segmentalbranches of the right lower   lobe pulmonary artery. Appearance is unchanged when compared to previous   exam. No new filling defects are noted. Right-sided Mediport catheter is   noted in place.    No endobronchial lesions are noted. Peripherally situated opacity   containing central lucency is noted in the posterior segment of the right   lower lobe. Trace right pleural effusion is noted.    Below the diaphragm, visualized portions of the abdomen are unremarkable.    Visualized osseous structures are within normal limits.    IMPRESSION: No significant change in the pulmonary embolus in the   segmental branch of the right lower lobe pulmonary artery when compared   to previous exam.    Findings suggestive of pulmonary infarct in the posterior segment of the   right lower lobe.    --- End of Report ---    < end of copied text >     HPI:  Patient is a 39yF, recently diagnosed with Left breast CA in nov 2024 on chemo. Patient was previously at [facility] in early december for SOB and chest pain found to have bilateral pulm emboli with clot in SVC extending to the right atrium;  during the stay patient was offered thrombectomy/MIKEY however patient declined at the time and opted for medical/conservative treatment of PE.  Patient follows Dr. Damien STALLINGS and received her first session of chemotherapy on December 5th as per the patient the infusion lasted 6hours and since then she has been having fatigue, sob on exertion, chronic productive cough, nausea and vomiting since the chemotherapy. Presented to [facility] for breast wound, s/p L breast I&D 12/31/24.     Vascular consulted for h/o b/l PE, patient currently denies SOB, CP, resting comfortably on RA. + cough.  CTA with stable PE  Patient is requesting thrombectomy but per chart review patient declined intervention at [facility]; explained that we do not have vascular cardiology team / PERT at [facility] and that patient is being treated with AC  currently.     Vital Signs Last 24 Hrs  T(C): 36.8 (05 Jan 2025 11:05), Max: 36.9 (04 Jan 2025 15:58)  T(F): 98.2 (05 Jan 2025 11:05), Max: 98.4 (04 Jan 2025 15:58)  HR: 95 (05 Jan 2025 11:05) (80 - 95)  BP: 116/53 (05 Jan 2025 11:05) (103/53 - 116/69)  BP(mean): 70 (05 Jan 2025 11:05) (70 - 82)  RR: 18 (05 Jan 2025 11:05) (18 - 18)  SpO2: 99% (05 Jan 2025 11:05) (97% - 99%)    Parameters below as of 05 Jan 2025 11:05  Patient On (Oxygen Delivery Method): room air      I&O's Detail    aluminum hydroxide/magnesium hydroxide/simethicone Suspension 30 milliLiter(s) Oral every 4 hours PRN  piperacillin/tazobactam IVPB.. 3.375 Gram(s) IV Intermittent every 8 hours      Physical Exam:  General: AAOx3, No acute distress  HEENT: NC/AT, trachea midline  Respiratory: Nonlabored breathing, equal chest rise b/l   Skin: No jaundice, no icterus  Abdomen: soft,  nondistended, nontender  Extremities: non edematous, no calf pain bilaterally    Lines/Drains/Tubes:     Drains/Tubes:       Labs:                        9.0    3.58  )-----------( 464      ( 05 Jan 2025 10:25 )             28.4     01-04    142  |  110[H]  |  9   ----------------------------<  117[H]  4.1   |  25  |  0.59    Ca    8.9      04 Jan 2025 05:50          RADIOLOGY & ADDITIONAL STUDIES:  < from: CT Angio Chest PE Protocol w/ IV Cont (12.31.24 @ 08:34) >    ACC: [id number] EXAM:  CT ANGIO CHEST PULM [facility]   ORDERED BY: LANI TURNER     PROCEDURE DATE:  12/31/2024          INTERPRETATION:  Clinical information: Chest pain. History of metastatic   breast cancer. Evaluate for pulmonary embolus. Exam is compared to   previous study of 12/12/2024.    CT angiogram of the chest was obtained following administration of   intravenous contrast. Approximately 60 cc of Omnipaque 350 was   administered in 40 cc was discarded. Coronal, sagittal and MIP images   were submitted for review.    Once again, a large mass is noted within the left breast. This is   incompletely imaged on this exam. Marked hazy changes within the left   breast as well as thickening of the skin overlying the left breast are   noted. Multiple lymph nodes are present in the left axilla/left   subpectoral region.    No hilar or mediastinal adenopathy is noted.    Heart is normal in size. No pericardial effusion is noted. Once again,   filling defect is noted within the segmentalbranches of the right lower   lobe pulmonary artery. Appearance is unchanged when compared to previous   exam. No new filling defects are noted. Right-sided Mediport catheter is   noted in place.    No endobronchial lesions are noted. Peripherally situated opacity   containing central lucency is noted in the posterior segment of the right   lower lobe. Trace right pleural effusion is noted.    Below the diaphragm, visualized portions of the abdomen are unremarkable.    Visualized osseous structures are within normal limits.    IMPRESSION: No significant change in the pulmonary embolus in the   segmental branch of the right lower lobe pulmonary artery when compared   to previous exam.    Findings suggestive of pulmonary infarct in the posterior segment of the   right lower lobe.    --- End of Report ---    < end of copied text >

## 2025-01-05 NOTE — PROGRESS NOTE ADULT - PROBLEM SELECTOR PLAN 5
At [facility] diagnosed with bilateral PEs, patient refused thrombectomy initially hence started on medical management for PE.  CTa Chest: stable PE.   c/w tiffanie Orta (card)
At [facility] diagnosed with bilateral PEs, patient refused thrombectomy initially hence started on medical management for PE.  CTa Chest: stable PE.   c/w tiffanie Orta (card)
At [facility] diagnosed with bilateral PEs, patient refused thrombectomy initially hence started on medical management for PE.  CTa Chest: stable PE.   c/w tiffanie Orta (card) consulted
At [facility] diagnosed with bilateral PEs, patient refused thrombectomy initially hence started on medical management for PE.  CTa Chest: stable PE.   c/w tiffanie Orta (card)
At [facility] diagnosed with bilateral PEs, patient refused thrombectomy initially hence started on medical management for PE.  CTa Chest: stable PE.   c/w tiffanie Orta (card)
At [facility] diagnosed with bilateral PEs, patient refused thrombectomy initially hence started on medical management for PE.  CTa Chest: stable PE.   c/w tiffanie Orta (card) consulted

## 2025-01-07 PROBLEM — I26.99 OTHER PULMONARY EMBOLISM WITHOUT ACUTE COR PULMONALE: Chronic | Status: ACTIVE | Noted: 2024-12-31

## 2025-01-07 PROBLEM — C50.919 MALIGNANT NEOPLASM OF UNSPECIFIED SITE OF UNSPECIFIED FEMALE BREAST: Chronic | Status: ACTIVE | Noted: 2024-12-31

## 2025-01-14 ENCOUNTER — APPOINTMENT (OUTPATIENT)
Dept: SURGICAL ONCOLOGY | Facility: CLINIC | Age: 40
End: 2025-01-14

## 2025-01-14 ENCOUNTER — EMERGENCY (EMERGENCY)
Facility: HOSPITAL | Age: 40
LOS: 1 days | Discharge: HOME | End: 2025-01-14
Attending: EMERGENCY MEDICINE
Payer: MEDICAID

## 2025-01-14 ENCOUNTER — NON-APPOINTMENT (OUTPATIENT)
Age: 40
End: 2025-01-14

## 2025-01-14 VITALS
OXYGEN SATURATION: 95 % | SYSTOLIC BLOOD PRESSURE: 113 MMHG | HEART RATE: 89 BPM | TEMPERATURE: 98 F | RESPIRATION RATE: 18 BRPM | DIASTOLIC BLOOD PRESSURE: 72 MMHG

## 2025-01-14 VITALS
HEART RATE: 95 BPM | DIASTOLIC BLOOD PRESSURE: 78 MMHG | SYSTOLIC BLOOD PRESSURE: 122 MMHG | HEIGHT: 64 IN | OXYGEN SATURATION: 97 % | WEIGHT: 204.59 LBS | TEMPERATURE: 97 F | RESPIRATION RATE: 18 BRPM

## 2025-01-14 PROCEDURE — 99284 EMERGENCY DEPT VISIT MOD MDM: CPT

## 2025-01-14 PROCEDURE — 99283 EMERGENCY DEPT VISIT LOW MDM: CPT

## 2025-01-14 NOTE — ED PROVIDER NOTE - CLINICAL SUMMARY MEDICAL DECISION MAKING FREE TEXT BOX
Patient examined and intervened by breast surgeon prior to my assessment. No active bleeding noted at this time. Does not appear infected. Does not appear anemic. Patient alert and oriented and displays appropriate decision-making capacity and declines blood testing at this time. Likewise did not want to wait the advised 6-hour observation time. Has had chest symptoms since her PE without change acutely, and is compliant on her medications. Patient is well appearing, NAD, afebrile, hemodynamically stable. Discharged with instructions in further symptomatic care, return precautions, and need for surgery f/u.

## 2025-01-14 NOTE — ED PROVIDER NOTE - PHYSICAL EXAMINATION
Afebrile, hemodynamically stable, saturating well on room air  NAD, well appearing, sitting comfortably in bed, no WOB/tachypnea, speaking full sentences  Head NCAT  EOMI grossly, anicteric  MMM  RRR, nml S1/S2, no m/r/g  Lungs CTAB, no w/r/r  AAO, CN's 3-12 grossly intact  CHANG spontaneously  Skin warm, well perfused  Breast (PCA Charline as chaperone, verbal consent obtained from patient): examined after surgery intervention - as such bandages not fully removed however no active bleeding noted, mild induration of mass, no undue warmth or erythema, no crepitus

## 2025-01-14 NOTE — CONSULT NOTE ADULT - SUBJECTIVE AND OBJECTIVE BOX
[facility] Breast Surgery Consultation     Patient is a 39y old  Female who presents with a chief complaint of left breast bleeding.    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 Patient noticed left breast redness approximately 8 months ago and it was initially thought to be mastitis. She was seen by multiple providers and was prescribed multiple courses of antibiotics with no improvement. 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 She was recommended to have neoadjuvant chemotherapy, which was provided in early December 2024. Patient states following the chemotherapy she was feeling unwell and subsequently was diagnosed with bilateral PE for which she is on anticoagulation..     Recently she was admitted to [facility] and was noted to be febrile and breast surgery was called for evaluation for breast wounds. During evaluation, patient stated that since biopsy, she developed two wounds (one at 3:00 and another at 5-6:00) in the left breast. On exam, she was noted to have a large left breast necrotic ulcer with foul smelling discharge at 5-6:00 that was incompletely draining and a fluctuant ~2cm lesion with necrotic roof at 3:00. At bedside, the opening of the inferior wound was bluntly extended and copious amounts of seropurulent fluid was evacuated and cultures grew Staphylococcus Aureus and Proteus Mirabalis. I&D was performed at 3:00 lesion was with minimal drainage. Patient was discharged wound packing with antibiotics per ID and instructions to follow up in office. Patient did not follow up in office on two appointments and today after being reached via telephone, she complained of significant bleeding from left breast. She presents now in ED for evaluation of bleeding left breast wound.          PAST MEDICAL & SURGICAL HISTORY:  Breast cancer      Pulmonary embolism      No significant past surgical history          FAMILY HISTORY:  No pertinent family history in first degree relatives        SOCIAL HISTORY:    MEDICATIONS  (STANDING):    MEDICATIONS  (PRN):    Allergies    No Known Allergies    Intolerances        Vital Signs Last 24 Hrs  T(C): 36.3 (14 Jan 2025 19:44), Max: 36.3 (14 Jan 2025 19:44)  T(F): 97.3 (14 Jan 2025 19:44), Max: 97.3 (14 Jan 2025 19:44)  HR: 95 (14 Jan 2025 19:44) (95 - 95)  BP: 122/78 (14 Jan 2025 19:44) (122/78 - 122/78)  BP(mean): --  RR: 18 (14 Jan 2025 19:44) (18 - 18)  SpO2: 97% (14 Jan 2025 19:44) (97% - 97%)    Parameters below as of 14 Jan 2025 19:44  Patient On (Oxygen Delivery Method): room air      Daily Height in cm: 162.56 (14 Jan 2025 19:44)    Daily     General: NAD, well-nourished  HEENT: Atraumatic, EOMI  Resp: Breathing comfortably on RA  CV: Normal sinus rhythm  Abd: soft, ND  Ext: ROMIx4, motor strength intact x 4                  Radiographic Findings:       Assessment:                  [facility] Breast Surgery Consultation     Patient is a 39y old  Female who presents with a chief complaint of left breast bleeding.    HPI:     Linda Bartholomew is a 39F with past medical history of left breast inflammatory breast cancer currently receiving chemotherapy (1st cycle 12/2024, 2nd cycle 1/13/25) complicated by PE on AC, breast abscess s/p I&D (12/31/2024) now with left breast bleeding. Patient noticed left breast redness approximately 8 months ago and it was initially thought to be mastitis. She was seen by multiple providers and was prescribed multiple courses of antibiotics with no improvement. 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 She was recommended to have neoadjuvant chemotherapy, which was provided in early December 2024. Patient states following the chemotherapy she was feeling unwell and subsequently was diagnosed with bilateral PE for which she is on anticoagulation..     Recently she was admitted to [facility] and was noted to be febrile and breast surgery was called for evaluation for breast wounds. During evaluation, patient stated that since biopsy, she developed two wounds (one at 3:00 and another at 5-6:00) in the left breast. On exam, she was noted to have a large left breast necrotic ulcer with foul smelling discharge at 5-6:00 that was incompletely draining and a fluctuant ~2cm lesion with necrotic roof at 3:00. At bedside, the opening of the inferior wound was bluntly extended and copious amounts of seropurulent fluid was evacuated and cultures grew Staphylococcus Aureus and Proteus Mirabalis. I&D was performed at 3:00 lesion was with minimal drainage. Patient was discharged wound packing with antibiotics per ID and instructions to follow up in office. Patient did not follow up in office on two appointments and today after being reached via telephone, she complained of significant bleeding from left breast. She presents now in ED for evaluation of bleeding left breast wound. She states that bleeding started today over 4 hours ago when she was changing the dressing and gauze got stuck to some of the tissue from the wound. Patient denies bleeding from inferior wound and states that she is no longer packing it.          PAST MEDICAL & SURGICAL HISTORY:  Breast cancer      Pulmonary embolism      No significant past surgical history          FAMILY HISTORY:  No pertinent family history in first degree relatives        SOCIAL HISTORY:    MEDICATIONS  (STANDING):    MEDICATIONS  (PRN):    Allergies    No Known Allergies    Intolerances        Vital Signs Last 24 Hrs  T(C): 36.3 (14 Jan 2025 19:44), Max: 36.3 (14 Jan 2025 19:44)  T(F): 97.3 (14 Jan 2025 19:44), Max: 97.3 (14 Jan 2025 19:44)  HR: 95 (14 Jan 2025 19:44) (95 - 95)  BP: 122/78 (14 Jan 2025 19:44) (122/78 - 122/78)  BP(mean): --  RR: 18 (14 Jan 2025 19:44) (18 - 18)  SpO2: 97% (14 Jan 2025 19:44) (97% - 97%)    Parameters below as of 14 Jan 2025 19:44  Patient On (Oxygen Delivery Method): room air      Daily Height in cm: 162.56 (14 Jan 2025 19:44)    Daily     General: NAD, well-nourished, anxious  HEENT: Atraumatic, EOMI  Resp: Breathing comfortably on RA  CV: Normal sinus rhythm  Breast: Large inflamed left breast with 3:00 wound measuring approximately 3 cm with mild oozing and fibrinous tissue over necrotic tissue, 6:00 ulcer measuring 3-4 cm with mild oozing

## 2025-01-14 NOTE — ED PROVIDER NOTE - CARE PROVIDER_API CALL
Jonn Berman  Surgery  [address] [place] [address], [address]  [place] [address]  Phone: [phone]  Fax: [phone]  Follow Up Time: 1-3 Days

## 2025-01-14 NOTE — ED PROVIDER NOTE - PATIENT PORTAL LINK FT
You can access the FollowMyHealth Patient Portal offered by [facility] by registering at the following website: [email or web address]. By joining [facility]’s FollowMyHealth portal, you will also be able to view your health information using other applications (apps) compatible with our system.

## 2025-01-14 NOTE — ED ADULT TRIAGE NOTE - CHIEF COMPLAINT QUOTE
Pt BIBA s/p biopsy on left breast 5 days ago c/o bleeding from site. pt is on Eliquis. pt stated she was diagnosis with breast cancer, stage 2.

## 2025-01-14 NOTE — CONSULT NOTE ADULT - ASSESSMENT
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        39F with past medical history of left breast inflammatory breast cancer currently receiving chemotherapy (1st cycle 12/2024, 2nd cycle 1/13/25) complicated by PE on AC, breast abscess s/p I&D (12/31/2024) now with left breast bleeding.    I had a lengthy discussion with the patient and mother at bedside and explained that the enlargement of the 3:00 left breast ulcer with delayed bleeding 2 weeks after I&D is unlikely to be related to the procedure, rather it is related to the evolution of necrotic tumor. I cleaned the affected area and placed absorbable Surgicel snow in both the 3:00 and 6:00 left breast ulcers, which resolved the mild oozing occurring at these sites. I explained that since she presented with concerns of bleeding, I recommend a 6-hour monitoring period. Patient prefers to go home and return to ED for any new signs of bleeding. All questions were answered and patient demonstrated full understanding of our discussion.    - Recommend 6 hour observation (patient expressed desire to go home and return to ED if any new bleeding)  - Recommend wound care follow up  - Follow up in office on Friday    Jonn Berman MD  Breast Surgeon

## 2025-01-14 NOTE — ED PROVIDER NOTE - NSFOLLOWUPINSTRUCTIONS_ED_ALL_ED_FT
Please follow-up with Dr. Jonn Berman by calling his office and making an appointment to see him this Friday.  Please return to the emergency department if you have more bleeding, pain, fever, chills, chest pain, shortness of breath, or any other symptoms.

## 2025-01-14 NOTE — ED PROVIDER NOTE - OBJECTIVE STATEMENT
39-year-old female with history of PE states compliant on her 5 mg twice daily Eliquis, left breast cancer, on chemotherapy, status post I&D of breast abscess, presents with mother with left-sided breast bleeding that began today after she states she removed the bandage from the area. Denies fever, chills, weakness, dizziness, and all other symptoms other than a midsternal discomfort that she has had without change ever since she was diagnosed with the PE.

## 2025-01-15 ENCOUNTER — NON-APPOINTMENT (OUTPATIENT)
Age: 40
End: 2025-01-15

## 2025-01-15 NOTE — ED ADULT NURSE NOTE - OBJECTIVE STATEMENT
PT coming in for bleeding from biopsy site L breast taken a few days ago.  Pt states being on eliquis for two lung clots.  Denying CP, SOB, no SS of acut edistress.  PT denying having IV placed/labs drawn.  Surgeon +  both aware.

## 2025-01-18 ENCOUNTER — INPATIENT (INPATIENT)
Facility: HOSPITAL | Age: 40
LOS: 0 days | Discharge: HOME | DRG: 914 | End: 2025-01-19
Attending: INTERNAL MEDICINE | Admitting: INTERNAL MEDICINE
Payer: MEDICAID

## 2025-01-18 VITALS
RESPIRATION RATE: 18 BRPM | TEMPERATURE: 98 F | DIASTOLIC BLOOD PRESSURE: 76 MMHG | SYSTOLIC BLOOD PRESSURE: 113 MMHG | HEIGHT: 64 IN | WEIGHT: 184.97 LBS | OXYGEN SATURATION: 96 % | HEART RATE: 109 BPM

## 2025-01-18 DIAGNOSIS — C50.919 MALIGNANT NEOPLASM OF UNSPECIFIED SITE OF UNSPECIFIED FEMALE BREAST: ICD-10-CM

## 2025-01-18 DIAGNOSIS — Z29.9 ENCOUNTER FOR PROPHYLACTIC MEASURES, UNSPECIFIED: ICD-10-CM

## 2025-01-18 DIAGNOSIS — L03.90 CELLULITIS, UNSPECIFIED: ICD-10-CM

## 2025-01-18 DIAGNOSIS — T14.8XXA OTHER INJURY OF UNSPECIFIED BODY REGION, INITIAL ENCOUNTER: ICD-10-CM

## 2025-01-18 DIAGNOSIS — I26.99 OTHER PULMONARY EMBOLISM WITHOUT ACUTE COR PULMONALE: ICD-10-CM

## 2025-01-18 LAB
ALBUMIN SERPL ELPH-MCNC: 2.4 G/DL — LOW (ref 3.5–5)
ALP SERPL-CCNC: 91 U/L — SIGNIFICANT CHANGE UP (ref 40–120)
ALT FLD-CCNC: 15 U/L DA — SIGNIFICANT CHANGE UP (ref 10–60)
ANION GAP SERPL CALC-SCNC: 6 MMOL/L — SIGNIFICANT CHANGE UP (ref 5–17)
ANION GAP SERPL CALC-SCNC: 8 MMOL/L — SIGNIFICANT CHANGE UP (ref 5–17)
APTT BLD: 29.4 SEC — SIGNIFICANT CHANGE UP (ref 24.5–35.6)
AST SERPL-CCNC: 8 U/L — LOW (ref 10–40)
BASOPHILS # BLD AUTO: 0.02 K/UL — SIGNIFICANT CHANGE UP (ref 0–0.2)
BASOPHILS # BLD AUTO: 0.02 K/UL — SIGNIFICANT CHANGE UP (ref 0–0.2)
BASOPHILS NFR BLD AUTO: 0.5 % — SIGNIFICANT CHANGE UP (ref 0–2)
BASOPHILS NFR BLD AUTO: 0.6 % — SIGNIFICANT CHANGE UP (ref 0–2)
BILIRUB SERPL-MCNC: 0.3 MG/DL — SIGNIFICANT CHANGE UP (ref 0.2–1.2)
BLD GP AB SCN SERPL QL: SIGNIFICANT CHANGE UP
BUN SERPL-MCNC: 17 MG/DL — SIGNIFICANT CHANGE UP (ref 7–18)
BUN SERPL-MCNC: 17 MG/DL — SIGNIFICANT CHANGE UP (ref 7–18)
CALCIUM SERPL-MCNC: 8.7 MG/DL — SIGNIFICANT CHANGE UP (ref 8.4–10.5)
CALCIUM SERPL-MCNC: 8.7 MG/DL — SIGNIFICANT CHANGE UP (ref 8.4–10.5)
CHLORIDE SERPL-SCNC: 105 MMOL/L — SIGNIFICANT CHANGE UP (ref 96–108)
CHLORIDE SERPL-SCNC: 107 MMOL/L — SIGNIFICANT CHANGE UP (ref 96–108)
CO2 SERPL-SCNC: 28 MMOL/L — SIGNIFICANT CHANGE UP (ref 22–31)
CO2 SERPL-SCNC: 29 MMOL/L — SIGNIFICANT CHANGE UP (ref 22–31)
CREAT SERPL-MCNC: 0.58 MG/DL — SIGNIFICANT CHANGE UP (ref 0.5–1.3)
CREAT SERPL-MCNC: 0.65 MG/DL — SIGNIFICANT CHANGE UP (ref 0.5–1.3)
EGFR: 114 ML/MIN/1.73M2 — SIGNIFICANT CHANGE UP
EGFR: 117 ML/MIN/1.73M2 — SIGNIFICANT CHANGE UP
EOSINOPHIL # BLD AUTO: 0.05 K/UL — SIGNIFICANT CHANGE UP (ref 0–0.5)
EOSINOPHIL # BLD AUTO: 0.05 K/UL — SIGNIFICANT CHANGE UP (ref 0–0.5)
EOSINOPHIL NFR BLD AUTO: 1.4 % — SIGNIFICANT CHANGE UP (ref 0–6)
EOSINOPHIL NFR BLD AUTO: 1.4 % — SIGNIFICANT CHANGE UP (ref 0–6)
GLUCOSE SERPL-MCNC: 132 MG/DL — HIGH (ref 70–99)
GLUCOSE SERPL-MCNC: 140 MG/DL — HIGH (ref 70–99)
HCG SERPL-ACNC: <1 MIU/ML — SIGNIFICANT CHANGE UP
HCT VFR BLD CALC: 27.1 % — LOW (ref 34.5–45)
HCT VFR BLD CALC: 29.6 % — LOW (ref 34.5–45)
HCT VFR BLD CALC: 30 % — LOW (ref 34.5–45)
HCT VFR BLD CALC: 30.3 % — LOW (ref 34.5–45)
HGB BLD-MCNC: 8.5 G/DL — LOW (ref 11.5–15.5)
HGB BLD-MCNC: 9.1 G/DL — LOW (ref 11.5–15.5)
HGB BLD-MCNC: 9.1 G/DL — LOW (ref 11.5–15.5)
HGB BLD-MCNC: 9.4 G/DL — LOW (ref 11.5–15.5)
IMM GRANULOCYTES NFR BLD AUTO: 0.3 % — SIGNIFICANT CHANGE UP (ref 0–0.9)
IMM GRANULOCYTES NFR BLD AUTO: 0.6 % — SIGNIFICANT CHANGE UP (ref 0–0.9)
INR BLD: 1.08 RATIO — SIGNIFICANT CHANGE UP (ref 0.85–1.16)
LYMPHOCYTES # BLD AUTO: 1.4 K/UL — SIGNIFICANT CHANGE UP (ref 1–3.3)
LYMPHOCYTES # BLD AUTO: 1.46 K/UL — SIGNIFICANT CHANGE UP (ref 1–3.3)
LYMPHOCYTES # BLD AUTO: 39.3 % — SIGNIFICANT CHANGE UP (ref 13–44)
LYMPHOCYTES # BLD AUTO: 39.9 % — SIGNIFICANT CHANGE UP (ref 13–44)
MAGNESIUM SERPL-MCNC: 2.1 MG/DL — SIGNIFICANT CHANGE UP (ref 1.6–2.6)
MCHC RBC-ENTMCNC: 26.3 PG — LOW (ref 27–34)
MCHC RBC-ENTMCNC: 26.3 PG — LOW (ref 27–34)
MCHC RBC-ENTMCNC: 26.5 PG — LOW (ref 27–34)
MCHC RBC-ENTMCNC: 26.5 PG — LOW (ref 27–34)
MCHC RBC-ENTMCNC: 30.3 G/DL — LOW (ref 32–36)
MCHC RBC-ENTMCNC: 30.7 G/DL — LOW (ref 32–36)
MCHC RBC-ENTMCNC: 31 G/DL — LOW (ref 32–36)
MCHC RBC-ENTMCNC: 31.4 G/DL — LOW (ref 32–36)
MCV RBC AUTO: 83.9 FL — SIGNIFICANT CHANGE UP (ref 80–100)
MCV RBC AUTO: 84.6 FL — SIGNIFICANT CHANGE UP (ref 80–100)
MCV RBC AUTO: 86.3 FL — SIGNIFICANT CHANGE UP (ref 80–100)
MCV RBC AUTO: 87.2 FL — SIGNIFICANT CHANGE UP (ref 80–100)
MONOCYTES # BLD AUTO: 0.24 K/UL — SIGNIFICANT CHANGE UP (ref 0–0.9)
MONOCYTES # BLD AUTO: 0.26 K/UL — SIGNIFICANT CHANGE UP (ref 0–0.9)
MONOCYTES NFR BLD AUTO: 6.7 % — SIGNIFICANT CHANGE UP (ref 2–14)
MONOCYTES NFR BLD AUTO: 7.1 % — SIGNIFICANT CHANGE UP (ref 2–14)
NEUTROPHILS # BLD AUTO: 1.83 K/UL — SIGNIFICANT CHANGE UP (ref 1.8–7.4)
NEUTROPHILS # BLD AUTO: 1.86 K/UL — SIGNIFICANT CHANGE UP (ref 1.8–7.4)
NEUTROPHILS NFR BLD AUTO: 50.8 % — SIGNIFICANT CHANGE UP (ref 43–77)
NEUTROPHILS NFR BLD AUTO: 51.4 % — SIGNIFICANT CHANGE UP (ref 43–77)
NRBC # BLD: 0 /100 WBCS — SIGNIFICANT CHANGE UP (ref 0–0)
NRBC BLD-RTO: 0 /100 WBCS — SIGNIFICANT CHANGE UP (ref 0–0)
PHOSPHATE SERPL-MCNC: 4 MG/DL — SIGNIFICANT CHANGE UP (ref 2.5–4.5)
PLATELET # BLD AUTO: 346 K/UL — SIGNIFICANT CHANGE UP (ref 150–400)
PLATELET # BLD AUTO: 346 K/UL — SIGNIFICANT CHANGE UP (ref 150–400)
PLATELET # BLD AUTO: 367 K/UL — SIGNIFICANT CHANGE UP (ref 150–400)
PLATELET # BLD AUTO: 369 K/UL — SIGNIFICANT CHANGE UP (ref 150–400)
POTASSIUM SERPL-MCNC: 3.8 MMOL/L — SIGNIFICANT CHANGE UP (ref 3.5–5.3)
POTASSIUM SERPL-MCNC: 3.9 MMOL/L — SIGNIFICANT CHANGE UP (ref 3.5–5.3)
POTASSIUM SERPL-SCNC: 3.8 MMOL/L — SIGNIFICANT CHANGE UP (ref 3.5–5.3)
POTASSIUM SERPL-SCNC: 3.9 MMOL/L — SIGNIFICANT CHANGE UP (ref 3.5–5.3)
PROT SERPL-MCNC: 6.3 G/DL — SIGNIFICANT CHANGE UP (ref 6–8.3)
PROTHROM AB SERPL-ACNC: 12.5 SEC — SIGNIFICANT CHANGE UP (ref 9.9–13.4)
RBC # BLD: 3.23 M/UL — LOW (ref 3.8–5.2)
RBC # BLD: 3.43 M/UL — LOW (ref 3.8–5.2)
RBC # BLD: 3.44 M/UL — LOW (ref 3.8–5.2)
RBC # BLD: 3.58 M/UL — LOW (ref 3.8–5.2)
RBC # FLD: 23.7 % — HIGH (ref 10.3–14.5)
RBC # FLD: 23.9 % — HIGH (ref 10.3–14.5)
RBC # FLD: 24 % — HIGH (ref 10.3–14.5)
RBC # FLD: 24.1 % — HIGH (ref 10.3–14.5)
SODIUM SERPL-SCNC: 141 MMOL/L — SIGNIFICANT CHANGE UP (ref 135–145)
SODIUM SERPL-SCNC: 142 MMOL/L — SIGNIFICANT CHANGE UP (ref 135–145)
TROPONIN I, HIGH SENSITIVITY RESULT: <3 NG/L — SIGNIFICANT CHANGE UP
WBC # BLD: 3.56 K/UL — LOW (ref 3.8–10.5)
WBC # BLD: 3.66 K/UL — LOW (ref 3.8–10.5)
WBC # BLD: 3.73 K/UL — LOW (ref 3.8–10.5)
WBC # BLD: 3.79 K/UL — LOW (ref 3.8–10.5)
WBC # FLD AUTO: 3.56 K/UL — LOW (ref 3.8–10.5)
WBC # FLD AUTO: 3.66 K/UL — LOW (ref 3.8–10.5)
WBC # FLD AUTO: 3.73 K/UL — LOW (ref 3.8–10.5)
WBC # FLD AUTO: 3.79 K/UL — LOW (ref 3.8–10.5)

## 2025-01-18 PROCEDURE — 71275 CT ANGIOGRAPHY CHEST: CPT | Mod: 26

## 2025-01-18 PROCEDURE — 99285 EMERGENCY DEPT VISIT HI MDM: CPT

## 2025-01-18 RX ORDER — VANCOMYCIN HYDROCHLORIDE 50 MG/ML
1250 KIT ORAL EVERY 12 HOURS
Refills: 0 | Status: DISCONTINUED | OUTPATIENT
Start: 2025-01-18 | End: 2025-01-19

## 2025-01-18 RX ORDER — ACETAMINOPHEN 160 MG/5ML
650 SUSPENSION ORAL EVERY 6 HOURS
Refills: 0 | Status: DISCONTINUED | OUTPATIENT
Start: 2025-01-18 | End: 2025-01-19

## 2025-01-18 RX ADMIN — VANCOMYCIN HYDROCHLORIDE 166.67 MILLIGRAM(S): KIT at 18:35

## 2025-01-18 NOTE — CONSULT NOTE ADULT - SUBJECTIVE AND OBJECTIVE BOX
INCOMPLETE    GENERAL SURGERY CONSULT NOTE     chief complaint of lt breast swelllng      HPI:  38 y/o f with PMHx of left breast inflammatory breast cancer currently receiving neoadjuvant chemotherapy (1st cycle 12/2024, 2nd cycle 1/13/25) complicated by PE on AC, breast abscess s/p I&D (12/31/2024) now with left breast bleeding and swelling.  s/p punch biopsy done in which she says her symptoms started. A poor historian , unable to give a clear timeline on events leading up to her L breast swelling.  Patient noticed left breast redness approximately 8 months ago s/p punch biopsy  and it was initially thought to be mastitis. She was seen by multiple providers and was prescribed multiple courses of antibiotics with no improvement...     Of note, had PE since admission at [facility] in 12/2024 and at the time declined thrombectomy and only for AC (eliquis).       copious amounts of seropurulent fluid was evacuated and cultures grew Staphylococcus Aureus and Proteus Mirabalis. I&D was performed at 3:00 lesion was with minimal drainage. Patient was discharged wound packing with antibiotics per ID and instructions to follow up in office. Patient did not follow up in office on two appointments and today after being reached via telephone, she complained of significant bleeding from left breast. She presents now in ED for evaluation of bleeding left breast wound. She states that bleeding started today over 4 hours ago when she was changing the dressing and gauze got stuck to some of the tissue from the wound. Patient denies bleeding from inferior wound and states that she is no longer packing it.    PAST MEDICAL & SURGICAL HISTORY:  Breast cancer      Pulmonary embolism      No significant past surgical history          Review of Systems:    I have reviewed 9 systems with the patient and the only positive findings were     MEDICATIONS  (STANDING):    MEDICATIONS  (PRN):      Allergies    No Known Allergies    Intolerances        Social History:      FAMILY HISTORY:  No pertinent family history in first degree relatives        Vital Signs Last 24 Hrs  T(C): 37.1 (18 Jan 2025 05:09), Max: 37.1 (18 Jan 2025 05:09)  T(F): 98.8 (18 Jan 2025 05:09), Max: 98.8 (18 Jan 2025 05:09)  HR: 94 (18 Jan 2025 05:09) (94 - 109)  BP: 104/66 (18 Jan 2025 05:09) (104/66 - 113/76)  BP(mean): --  RR: 18 (18 Jan 2025 05:09) (18 - 18)  SpO2: 96% (18 Jan 2025 05:09) (96% - 96%)    Parameters below as of 18 Jan 2025 05:09  Patient On (Oxygen Delivery Method): room air        Physical Exam:  Vital Signs Last 24 Hrs  T(C): 37.1 (18 Jan 2025 05:09), Max: 37.1 (18 Jan 2025 05:09)  T(F): 98.8 (18 Jan 2025 05:09), Max: 98.8 (18 Jan 2025 05:09)  HR: 94 (18 Jan 2025 05:09) (94 - 109)  BP: 104/66 (18 Jan 2025 05:09) (104/66 - 113/76)  BP(mean): --  RR: 18 (18 Jan 2025 05:09) (18 - 18)  SpO2: 96% (18 Jan 2025 05:09) (96% - 96%)    Parameters below as of 18 Jan 2025 05:09  Patient On (Oxygen Delivery Method): room air        General:  A&Ox3,Appears stated age, No acute distress,  Head: NC/AT  EENT: PERRLA. EOMI. Conjunctiva and sclera clear. Pharynx clear.  Neck: Supple. No JVD  Lungs: CTA B/l. Nonlabored Respirations  CV: +S1S2, RRR  Abdomen: Soft, Nondistended,  Nontender, no guarding, no rebound  Extremities: Warm and well perfused. 2+ peripheral pulses b/l. Calf soft, nontender b/l. No pedal edema.            LABS:                        9.1    3.66  )-----------( 367      ( 18 Jan 2025 04:00 )             29.6     01-18    142  |  105  |  17  ----------------------------<  132[H]  3.9   |  29  |  0.65    Ca    8.7      18 Jan 2025 04:00    TPro  6.3  /  Alb  2.4[L]  /  TBili  0.3  /  DBili  x   /  AST  8[L]  /  ALT  15  /  AlkPhos  91  01-18    LIVER FUNCTIONS - ( 18 Jan 2025 04:00 )  Alb: 2.4 g/dL / Pro: 6.3 g/dL / ALK PHOS: 91 U/L / ALT: 15 U/L DA / AST: 8 U/L / GGT: x           PT/INR - ( 18 Jan 2025 04:00 )   PT: 12.5 sec;   INR: 1.08 ratio         PTT - ( 18 Jan 2025 04:00 )  PTT:29.4 sec  Urinalysis Basic - ( 18 Jan 2025 04:00 )    Color: x / Appearance: x / SG: x / pH: x  Gluc: 132 mg/dL / Ketone: x  / Bili: x / Urobili: x   Blood: x / Protein: x / Nitrite: x   Leuk Esterase: x / RBC: x / WBC x   Sq Epi: x / Non Sq Epi: x / Bacteria: x        RADIOLOGY & ADDITIONAL STUDIES: INCOMPLETE    GENERAL SURGERY CONSULT NOTE     chief complaint of lt breast swelllng      HPI:  40 y/o f with PMHx of left breast inflammatory breast cancer currently receiving neoadjuvant chemotherapy (1st cycle 12/2024, 2nd cycle 1/13/25) complicated by PE on AC, breast abscess s/p I&D (12/31/2024 blood cx  grew Staphylococcus Aureus and Proteus Mirabalis. ) now with left breast bleeding and swelling.  s/p punch biopsy done in which she says her symptoms started. A poor historian , unable to give a clear timeline on events leading up to her L breast swelling.  Patient noticed left breast redness approximately 8 months ago s/p punch biopsy  and it was initially thought to be mastitis. She was seen by multiple providers and was prescribed multiple courses of antibiotics with no improvement...     Of note, had PE since admission at [facility] in 12/2024 and at the time declined thrombectomy and only for AC (eliquis). Denies fever, chills, SOB , lightheadedness, dysuria or any other complaints at this time.     PAST MEDICAL & SURGICAL HISTORY:  Breast cancer      Pulmonary embolism      No significant past surgical history           MEDICATIONS  (STANDING):    MEDICATIONS  (PRN):      Allergies    No Known Allergies    Intolerances        Social History:      FAMILY HISTORY:  No pertinent family history in first degree relatives        Vital Signs Last 24 Hrs  T(C): 37.1 (18 Jan 2025 05:09), Max: 37.1 (18 Jan 2025 05:09)  T(F): 98.8 (18 Jan 2025 05:09), Max: 98.8 (18 Jan 2025 05:09)  HR: 94 (18 Jan 2025 05:09) (94 - 109)  BP: 104/66 (18 Jan 2025 05:09) (104/66 - 113/76)  BP(mean): --  RR: 18 (18 Jan 2025 05:09) (18 - 18)  SpO2: 96% (18 Jan 2025 05:09) (96% - 96%)    Parameters below as of 18 Jan 2025 05:09  Patient On (Oxygen Delivery Method): room air        Physical Exam:  Vital Signs Last 24 Hrs  T(C): 37.1 (18 Jan 2025 05:09), Max: 37.1 (18 Jan 2025 05:09)  T(F): 98.8 (18 Jan 2025 05:09), Max: 98.8 (18 Jan 2025 05:09)  HR: 94 (18 Jan 2025 05:09) (94 - 109)  BP: 104/66 (18 Jan 2025 05:09) (104/66 - 113/76)  BP(mean): --  RR: 18 (18 Jan 2025 05:09) (18 - 18)  SpO2: 96% (18 Jan 2025 05:09) (96% - 96%)    Parameters below as of 18 Jan 2025 05:09  Patient On (Oxygen Delivery Method): room air        General:  A&Ox3,Appears stated age, No acute distress,  Head: NC/AT  EENT: PERRLA. EOMI. Conjunctiva and sclera clear. Pharynx clear.  Neck: Supple. No JVD  Lungs: CTA B/l. Nonlabored Respirations  CV: +S1S2, RRR  Breast : large breast swelling , erythematous, tender to palpation with blood soaked dressings   Abdomen: Soft, Nondistended,  Nontender, no guarding, no rebound  Extremities: Warm and well perfused. 2+ peripheral pulses b/l. Calf soft, nontender b/l. No pedal edema.            LABS:                        9.1    3.66  )-----------( 367      ( 18 Jan 2025 04:00 )             29.6     01-18    142  |  105  |  17  ----------------------------<  132[H]  3.9   |  29  |  0.65    Ca    8.7      18 Jan 2025 04:00    TPro  6.3  /  Alb  2.4[L]  /  TBili  0.3  /  DBili  x   /  AST  8[L]  /  ALT  15  /  AlkPhos  91  01-18    LIVER FUNCTIONS - ( 18 Jan 2025 04:00 )  Alb: 2.4 g/dL / Pro: 6.3 g/dL / ALK PHOS: 91 U/L / ALT: 15 U/L DA / AST: 8 U/L / GGT: x           PT/INR - ( 18 Jan 2025 04:00 )   PT: 12.5 sec;   INR: 1.08 ratio         PTT - ( 18 Jan 2025 04:00 )  PTT:29.4 sec  Urinalysis Basic - ( 18 Jan 2025 04:00 )    Color: x / Appearance: x / SG: x / pH: x  Gluc: 132 mg/dL / Ketone: x  / Bili: x / Urobili: x   Blood: x / Protein: x / Nitrite: x   Leuk Esterase: x / RBC: x / WBC x   Sq Epi: x / Non Sq Epi: x / Bacteria: x        RADIOLOGY & ADDITIONAL STUDIES:  N/A GENERAL SURGERY CONSULT NOTE     chief complaint of lt breast swelllng      HPI:  40 y/o f with PMHx of left breast inflammatory breast cancer currently receiving neoadjuvant chemotherapy (1st cycle 12/2024, 2nd cycle 1/13/25) complicated by PE on AC, breast abscess s/p I&D (12/31/2024 blood cx  grew Staphylococcus Aureus and Proteus Mirabalis. ) now with left breast bleeding and swelling.  s/p punch biopsy done in which she says her symptoms started. A poor historian , unable to give a clear timeline on events leading up to her L breast swelling.  Patient noticed left breast redness approximately 8 months ago s/p punch biopsy  and it was initially thought to be mastitis. She was seen by multiple providers and was prescribed multiple courses of antibiotics with no improvement...     Of note, had PE since admission at [facility] in 12/2024 and at the time declined thrombectomy and only for AC (eliquis). Denies fever, chills, SOB , lightheadedness, dysuria or any other complaints at this time.     PAST MEDICAL & SURGICAL HISTORY:  Breast cancer      Pulmonary embolism      No significant past surgical history           MEDICATIONS  (STANDING):    MEDICATIONS  (PRN):      Allergies    No Known Allergies    Intolerances        Social History:      FAMILY HISTORY:  No pertinent family history in first degree relatives        Vital Signs Last 24 Hrs  T(C): 37.1 (18 Jan 2025 05:09), Max: 37.1 (18 Jan 2025 05:09)  T(F): 98.8 (18 Jan 2025 05:09), Max: 98.8 (18 Jan 2025 05:09)  HR: 94 (18 Jan 2025 05:09) (94 - 109)  BP: 104/66 (18 Jan 2025 05:09) (104/66 - 113/76)  BP(mean): --  RR: 18 (18 Jan 2025 05:09) (18 - 18)  SpO2: 96% (18 Jan 2025 05:09) (96% - 96%)    Parameters below as of 18 Jan 2025 05:09  Patient On (Oxygen Delivery Method): room air        Physical Exam:  Vital Signs Last 24 Hrs  T(C): 37.1 (18 Jan 2025 05:09), Max: 37.1 (18 Jan 2025 05:09)  T(F): 98.8 (18 Jan 2025 05:09), Max: 98.8 (18 Jan 2025 05:09)  HR: 94 (18 Jan 2025 05:09) (94 - 109)  BP: 104/66 (18 Jan 2025 05:09) (104/66 - 113/76)  BP(mean): --  RR: 18 (18 Jan 2025 05:09) (18 - 18)  SpO2: 96% (18 Jan 2025 05:09) (96% - 96%)    Parameters below as of 18 Jan 2025 05:09  Patient On (Oxygen Delivery Method): room air        General:  A&Ox3,Appears stated age, No acute distress,  Head: NC/AT  EENT: PERRLA. EOMI. Conjunctiva and sclera clear. Pharynx clear.  Neck: Supple. No JVD  Lungs: CTA B/l. Nonlabored Respirations  CV: +S1S2, RRR  Breast : large breast swelling , erythematous, tender to palpation with blood soaked dressings   Abdomen: Soft, Nondistended,  Nontender, no guarding, no rebound  Extremities: Warm and well perfused. 2+ peripheral pulses b/l. Calf soft, nontender b/l. No pedal edema.            LABS:                        9.1    3.66  )-----------( 367      ( 18 Jan 2025 04:00 )             29.6     01-18    142  |  105  |  17  ----------------------------<  132[H]  3.9   |  29  |  0.65    Ca    8.7      18 Jan 2025 04:00    TPro  6.3  /  Alb  2.4[L]  /  TBili  0.3  /  DBili  x   /  AST  8[L]  /  ALT  15  /  AlkPhos  91  01-18    LIVER FUNCTIONS - ( 18 Jan 2025 04:00 )  Alb: 2.4 g/dL / Pro: 6.3 g/dL / ALK PHOS: 91 U/L / ALT: 15 U/L DA / AST: 8 U/L / GGT: x           PT/INR - ( 18 Jan 2025 04:00 )   PT: 12.5 sec;   INR: 1.08 ratio         PTT - ( 18 Jan 2025 04:00 )  PTT:29.4 sec  Urinalysis Basic - ( 18 Jan 2025 04:00 )    Color: x / Appearance: x / SG: x / pH: x  Gluc: 132 mg/dL / Ketone: x  / Bili: x / Urobili: x   Blood: x / Protein: x / Nitrite: x   Leuk Esterase: x / RBC: x / WBC x   Sq Epi: x / Non Sq Epi: x / Bacteria: x        RADIOLOGY & ADDITIONAL STUDIES:  N/A

## 2025-01-18 NOTE — H&P ADULT - PROBLEM SELECTOR PLAN 1
-patient presenting with continuous bleeding from left breast  -s/p I&D on 1/2025 due to breast abscess from inflammatory breast cancer  -in the ED noted to have tachycardia and leukopenia   -on PE noted redness on the skin with peau de orange skin  -will start on vancomycin due to being immunocompromised   -surgery following -patient presenting with continuous bleeding from left breast  -s/p I&D on 1/2025 due to breast abscess from inflammatory breast cancer  -in the ED noted to have tachycardia and leukopenia   -on PE noted redness on the skin with peau de orange skin  -will start on vancomycin due to being immunocompromised   -surgery following  -ID consult

## 2025-01-18 NOTE — ED PROVIDER NOTE - CLINICAL SUMMARY MEDICAL DECISION MAKING FREE TEXT BOX
39-year-old female history of breast cancer diagnosed November 2024 on chemotherapy recently admitted for pulmonary emboli on [facility] with recent I&D of infected breast mass by Dr. Berman on admission last month presents with new onset bleeding from the surgical site tonight.  States she was cleaning the wound and some tissue came out and began to bleed.  Denies pulsatility but feels generally weak with chest pain pleurisy and shortness of breath nonfocal neurologic exam, clear cardiopulmonary exam, benign abdomen breast findings limited due to patient refusal to remove bandages but with active soaking through gauze and induration with erythema concerning for active bleeding.  Consult surgery, CTA chest for evaluation of the breast as well as for failure of outpatient anticoagulation given active chest pain and pleurisy, admit

## 2025-01-18 NOTE — PATIENT PROFILE ADULT - MST SCORE
S :  16y year old female presents to ER for treatment of pain her Left ankle sprain. Patient states the pain in ankle yesterday, she sprained her ankle playing soccer yesterday and had a lot of difficulty climbing stairs.       Patient admits to  (-) Fevers, (-) Chills, (-) Nausea, (-) Vomiting, (-) Shortness of Breath      PMH: No pertinent past medical history    PSH: Previous Hypocure implant placement 2013    Allergies:No Known Allergies      Labs:        WBC Trend      Chem            T(F): 98.6 (08-21-17 @ 09:42), Max: 98.6 (08-21-17 @ 09:34)  HR: 70 (08-21-17 @ 09:34) (70 - 70)  BP: 123/79 (08-21-17 @ 09:34) (123/79 - 123/79)  RR: 16 (08-21-17 @ 09:34) (16 - 20)  SpO2: 99% (08-21-17 @ 09:34) (99% - 99%)  Wt(kg): --    O:   Vascular: Dorsalis Pedis and Posterior Tibial pulses 2/4. Capillary refill time less than 3 seconds digits 1-5 bilateral. Mild edema noted to the lateral aspect of the left ankle.   Neuro: Protective sensation intact to the level of the digits bilateral.  Integument: Skin warm, dry and supple bilateral. No open lesions or interdigital macerations noted bilateral. No hyperkeratotic lesions noted  Toenails: Bilateral 1-5 trimmed to hygienic length.  MSK: Muscle strength testing 5/5, elicited pain with dorsiflexion and plantarflexion.   No structural abnormality, bilaterally  Area of CC : Pain on palpation on the at the ATFL, Sinus Tarsi and lateral malleolus. Mild  erythema, Mild edema noted. No open lesions noted.     Xrays : Xrays reveal a displaced hypocure implant to the level of the sinus tarsi      A:  Displaced hypocure implant, planning on surgical management 8/21/17      P: Chart reviewed and patient evaluated  Reviewed X-rays. Shows displaced hypocure implant.  Patient to continue NWB in wheelchair with AirCast in place.  Patient advised to be Non-weightbearing to the Left at all times.  Patient to be surgically managed by Dr. Nette de los santos for removal of previous Hypocure and implantation of new hypocure.  Requesting Medical Clearance for surgical procedure tonight.  Patient is NPO. Patient states that she has not eaten since the previous night.  Patient consented for removal of hypocure and implantation of new hypocure tonight.   Discussed with attending. Dr. Perdue
0

## 2025-01-18 NOTE — H&P ADULT - PROBLEM SELECTOR PLAN 2
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

## 2025-01-18 NOTE — ED ADULT NURSE NOTE - NSSEPSISNEWALTERMENTAL_ED_A_ED
Covering for [facility]  Stable frontal meningioma present (stable since 2015)  Otherwise seen are age related changes.   Piedad Roberts MD No

## 2025-01-18 NOTE — PATIENT PROFILE ADULT - AVIAN FLU SYMPTOMS
I discussed this patient with Dr. Mia Gregorio.    He says that he has been communicating with Dr. Isidro Castellanos regarding this patient and that Dr. Isidro Castellanos and Dr. Mia Gregorio have both agreed that she needs to see a neuroophthalmologist.    I told patient that Dr. Omari Rico No

## 2025-01-18 NOTE — H&P ADULT - HISTORY OF PRESENT ILLNESS
39yF, recently diagnosed with Left breast CA in nov 2024 on chemo, and PE that comes in for bleeding in the breast. Per patient hd been noted that for the past 1 week has noted non stop bleeding from the left breast. Patient recently started on eliquis around 2 weeks ago for PE and seen recently by surgery for an I&D of infected mass. Patient endorses that for the past week has been changing the dressings every 20 mins due to blood. Patient also endorses that has been feling SOB, dizzy and with sweats. Patient follows OP with  at [facility] and is on chemo which was given 2 weeks ago with taxol. Patient denies any other associated symptoms.

## 2025-01-18 NOTE — CHART NOTE - NSCHARTNOTEFT_GEN_A_CORE
Pt seen at bedside in ED.   As requested per Dr. Berman to do pressure dressing to left breast. Pt seen at bedside in ED.   As requested per Dr. Berman to do pressure dressing to left breast.  Took down dressing from home with 2 wounds noted. Lateral breast with 4x4cm 3 o clock malodorous fungating mass with sloughing tissue and small amount of bloody oozing noted.  Midline inferior 6 o clock lesion noted on volar aspect of breast without any discharge noted.    Left breast was cleaned with 4x4 and saline solution. A clean dry dressing was applied with Telfa, 4x4, abd pad, cling wrap, and 2x Ace bandages which wrapped around contralateral shoulder to provide pressure dressing.   Wound care orders for this dressing were placed in chart under provider to RN should the dressing soak and pt requests for dressing change.

## 2025-01-18 NOTE — H&P ADULT - ATTENDING COMMENTS
pt was evaluated by writer earlier during the day   discussed management plan with house staff, pt and pts family

## 2025-01-18 NOTE — H&P ADULT - NSHPREVIEWOFSYSTEMS_GEN_ALL_CORE
- CONSTITUTIONAL: Denies fever and chills  - HEENT: Denies changes in vision and hearing.  - RESPIRATORY: Denies SOB and cough.  - CV: Denies chest pain and palpitations  - GI: Denies abdominal pain, nausea, vomiting and diarrhea.  - : Denies dysuria and urinary frequency.  - SKIN: bleeding in the left breast  - NEUROLOGICAL: Denies headache and syncope.  - PSYCHIATRIC: Denies recent changes in mood. Denies anxiety and depression.

## 2025-01-18 NOTE — H&P ADULT - NSHPPHYSICALEXAM_GEN_ALL_CORE
T(C): 36.7 (01-18-25 @ 08:26), Max: 37.1 (01-18-25 @ 05:09)  HR: 86 (01-18-25 @ 08:26) (86 - 109)  BP: 99/65 (01-18-25 @ 08:26) (99/65 - 113/76)  RR: 18 (01-18-25 @ 08:26) (18 - 18)  SpO2: 96% (01-18-25 @ 08:26) (96% - 96%)    GENERAL: NAD  HEAD:  Atraumatic, Normocephalic  EYES:  conjunctiva and sclera clear  NECK: Supple, No JVD, Normal thyroid  CHEST/LUNG: Clear to auscultation. Clear to percussion bilaterally; No rales, rhonchi, wheezing, or rubs  HEART: Regular rate and rhythm; No murmurs, rubs, or gallops  ABDOMEN: Soft, Nontender, Nondistended; Bowel sounds present  NERVOUS SYSTEM:  Alert & Oriented X3,    EXTREMITIES:  2+ Peripheral Pulses, No clubbing, cyanosis, or edema  SKIN: fresh blood noted on dressing on the lower quadrant of the Left breast, peau d orange skin noted on the site as well

## 2025-01-18 NOTE — ED PROVIDER NOTE - PHYSICAL EXAMINATION
General: non-toxic, NAD  HEENT: NCAT, PERRL  Cardiac: RRR, no murmurs, 2+ peripheral pulses  Resp: CTAB  Abdomen: soft, non-distended, bowel sounds present, no ttp, no rebound or guarding. no organomegaly  Extremities: no peripheral edema, calf tenderness, or leg size discrepancies  Skin: no rashes. did not allow me to remove the dressing from her breast to fully examine. L breast erythematous and indurated with peau d'orange skin changes.   Neuro: AAOx4, 5+motor, sensation grossly intact CN 2-12 intact  Psych: mood and affect appropriate

## 2025-01-18 NOTE — PATIENT PROFILE ADULT - FALL HARM RISK - HARM RISK INTERVENTIONS

## 2025-01-18 NOTE — H&P ADULT - ASSESSMENT
39yF, recently diagnosed with Left breast CA in nov 2024 on chemo, and PE that comes in for bleeding in the breast. Patient to be admitted for sepsis 2/2 cellulitis and breast hematoma

## 2025-01-18 NOTE — ED ADULT NURSE NOTE - NSFALLHARMRISKINTERV_ED_ALL_ED

## 2025-01-18 NOTE — ED PROVIDER NOTE - NS ED ROS FT
Constitutional: no fevers, chills  HEENT: no HA, vision changes, rhinorrhea, sore throat  Cardiac: +chest pain, no palpitations  Respiratory: +SOB, no cough or hemoptysis  GI: no n/v/d/c, abd pain, bloody or dark stools  : no dysuria, frequency, or hematuria  MSK: no joint pain, neck pain or back pain  Skin: no rashes, jaundice, pruritis  Neuro: no numbness/tingling, weakness, unsteady gait  ROS otherwise neg except per MDM

## 2025-01-19 VITALS
HEART RATE: 93 BPM | TEMPERATURE: 99 F | SYSTOLIC BLOOD PRESSURE: 117 MMHG | RESPIRATION RATE: 18 BRPM | DIASTOLIC BLOOD PRESSURE: 72 MMHG | OXYGEN SATURATION: 98 %

## 2025-01-19 LAB
ANION GAP SERPL CALC-SCNC: 7 MMOL/L — SIGNIFICANT CHANGE UP (ref 5–17)
BUN SERPL-MCNC: 17 MG/DL — SIGNIFICANT CHANGE UP (ref 7–18)
CALCIUM SERPL-MCNC: 8.8 MG/DL — SIGNIFICANT CHANGE UP (ref 8.4–10.5)
CHLORIDE SERPL-SCNC: 107 MMOL/L — SIGNIFICANT CHANGE UP (ref 96–108)
CO2 SERPL-SCNC: 25 MMOL/L — SIGNIFICANT CHANGE UP (ref 22–31)
CREAT SERPL-MCNC: 0.65 MG/DL — SIGNIFICANT CHANGE UP (ref 0.5–1.3)
EGFR: 114 ML/MIN/1.73M2 — SIGNIFICANT CHANGE UP
GLUCOSE SERPL-MCNC: 145 MG/DL — HIGH (ref 70–99)
HCT VFR BLD CALC: 26.2 % — LOW (ref 34.5–45)
HCT VFR BLD CALC: 29 % — LOW (ref 34.5–45)
HGB BLD-MCNC: 8.2 G/DL — LOW (ref 11.5–15.5)
HGB BLD-MCNC: 8.8 G/DL — LOW (ref 11.5–15.5)
MAGNESIUM SERPL-MCNC: 2 MG/DL — SIGNIFICANT CHANGE UP (ref 1.6–2.6)
MCHC RBC-ENTMCNC: 26.2 PG — LOW (ref 27–34)
MCHC RBC-ENTMCNC: 26.5 PG — LOW (ref 27–34)
MCHC RBC-ENTMCNC: 30.3 G/DL — LOW (ref 32–36)
MCHC RBC-ENTMCNC: 31.3 G/DL — LOW (ref 32–36)
MCV RBC AUTO: 84.5 FL — SIGNIFICANT CHANGE UP (ref 80–100)
MCV RBC AUTO: 86.3 FL — SIGNIFICANT CHANGE UP (ref 80–100)
NRBC # BLD: 0 /100 WBCS — SIGNIFICANT CHANGE UP (ref 0–0)
NRBC # BLD: 0 /100 WBCS — SIGNIFICANT CHANGE UP (ref 0–0)
NRBC BLD-RTO: 0 /100 WBCS — SIGNIFICANT CHANGE UP (ref 0–0)
NRBC BLD-RTO: 0 /100 WBCS — SIGNIFICANT CHANGE UP (ref 0–0)
PHOSPHATE SERPL-MCNC: 3.2 MG/DL — SIGNIFICANT CHANGE UP (ref 2.5–4.5)
PLATELET # BLD AUTO: 303 K/UL — SIGNIFICANT CHANGE UP (ref 150–400)
PLATELET # BLD AUTO: 331 K/UL — SIGNIFICANT CHANGE UP (ref 150–400)
POTASSIUM SERPL-MCNC: 4 MMOL/L — SIGNIFICANT CHANGE UP (ref 3.5–5.3)
POTASSIUM SERPL-SCNC: 4 MMOL/L — SIGNIFICANT CHANGE UP (ref 3.5–5.3)
RBC # BLD: 3.1 M/UL — LOW (ref 3.8–5.2)
RBC # BLD: 3.36 M/UL — LOW (ref 3.8–5.2)
RBC # FLD: 23.8 % — HIGH (ref 10.3–14.5)
RBC # FLD: 24.1 % — HIGH (ref 10.3–14.5)
SODIUM SERPL-SCNC: 139 MMOL/L — SIGNIFICANT CHANGE UP (ref 135–145)
WBC # BLD: 3.65 K/UL — LOW (ref 3.8–10.5)
WBC # BLD: 3.95 K/UL — SIGNIFICANT CHANGE UP (ref 3.8–10.5)
WBC # FLD AUTO: 3.65 K/UL — LOW (ref 3.8–10.5)
WBC # FLD AUTO: 3.95 K/UL — SIGNIFICANT CHANGE UP (ref 3.8–10.5)

## 2025-01-19 PROCEDURE — 84702 CHORIONIC GONADOTROPIN TEST: CPT

## 2025-01-19 PROCEDURE — 80048 BASIC METABOLIC PNL TOTAL CA: CPT

## 2025-01-19 PROCEDURE — 84484 ASSAY OF TROPONIN QUANT: CPT

## 2025-01-19 PROCEDURE — 85610 PROTHROMBIN TIME: CPT

## 2025-01-19 PROCEDURE — 86900 BLOOD TYPING SEROLOGIC ABO: CPT

## 2025-01-19 PROCEDURE — 86850 RBC ANTIBODY SCREEN: CPT

## 2025-01-19 PROCEDURE — 86901 BLOOD TYPING SEROLOGIC RH(D): CPT

## 2025-01-19 PROCEDURE — 80053 COMPREHEN METABOLIC PANEL: CPT

## 2025-01-19 PROCEDURE — 85027 COMPLETE CBC AUTOMATED: CPT

## 2025-01-19 PROCEDURE — 71275 CT ANGIOGRAPHY CHEST: CPT | Mod: MC

## 2025-01-19 PROCEDURE — 99285 EMERGENCY DEPT VISIT HI MDM: CPT | Mod: 25

## 2025-01-19 PROCEDURE — 85730 THROMBOPLASTIN TIME PARTIAL: CPT

## 2025-01-19 PROCEDURE — 87040 BLOOD CULTURE FOR BACTERIA: CPT

## 2025-01-19 PROCEDURE — 83735 ASSAY OF MAGNESIUM: CPT

## 2025-01-19 PROCEDURE — 36415 COLL VENOUS BLD VENIPUNCTURE: CPT

## 2025-01-19 PROCEDURE — 93005 ELECTROCARDIOGRAM TRACING: CPT

## 2025-01-19 PROCEDURE — 85025 COMPLETE CBC W/AUTO DIFF WBC: CPT

## 2025-01-19 PROCEDURE — 84100 ASSAY OF PHOSPHORUS: CPT

## 2025-01-19 RX ORDER — APIXABAN 5 MG/1
5 TABLET, FILM COATED ORAL EVERY 12 HOURS
Refills: 0 | Status: DISCONTINUED | OUTPATIENT
Start: 2025-01-19 | End: 2025-01-19

## 2025-01-19 RX ADMIN — VANCOMYCIN HYDROCHLORIDE 166.67 MILLIGRAM(S): KIT at 05:00

## 2025-01-19 NOTE — DISCHARGE NOTE PROVIDER - NSDCFUSCHEDAPPT_GEN_ALL_CORE_FT
Russ Foster Physician [facility]  CARDIOLOGY [address]  Scheduled Appointment: 01/23/2025

## 2025-01-19 NOTE — PROGRESS NOTE ADULT - PROBLEM SELECTOR PLAN 1
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

## 2025-01-19 NOTE — DISCHARGE NOTE NURSING/CASE MANAGEMENT/SOCIAL WORK - FINANCIAL ASSISTANCE
[facility] provides services at a reduced cost to those who are determined to be eligible through [facility]’s financial assistance program. Information regarding [facility]’s financial assistance program can be found by going to [email or web address] or by calling [phone].

## 2025-01-19 NOTE — CHART NOTE - NSCHARTNOTEFT_GEN_A_CORE
Informed by bedside RN that patient wanted to take her eliquis.    Pt was advised that it was recommendation by surgery to hold it at least temporarily.  Pt insisted she needed to take it and was going to take it herself.  Thankfully it was locked up with her belongings and only RN had key so pt was unable to take it herself.  Pt then asked to leave AMA.    -Discussed our plans for her hospital stay including trending her CBC and if stable and if surgery/heme/onc okay with resuming eliquis, resuming then and observing.  Pt is concerned about her appt for chemo tomorrow  -Discussed risks of leaving hospital AMA including incomplete medication plan for her eliquis (we would recommend holding it if she left AMA), and if she self-resumes it, there is risk for worsening bleeding, worsening anemia causing hypotension/reduced tissue perfusion and organ damage/death.   Discussed that if she leaves AMA, her previous wound care may not be resumed as it cannot be confirmed/reinstated by Case Management if she leaves AMA    At this time, she would like to recheck her CBC as planned and did not sign out AMA.    Mateo Marie NP

## 2025-01-19 NOTE — PROGRESS NOTE ADULT - SUBJECTIVE AND OBJECTIVE BOX
Heme/Onc Progress Note     HPI: 39-year-old female with triple negative breast cancer (T4N3), currently on MAREK with carbo+Taxol+Keytruda, cycle 1 on 01/13/2025, history of PE (diagnosed in December 2024, declined thrombectomy) on Eliquis, h/o anemia on IV iron  Presented with bleeding from the left breast the past 1 week, associated w/ swelling, which started after incision and drainage of  left breast abscess.  Hemoglobin on admission 9.5. Currently on Vancomycin. Hematology consulted further management.      Physical Exam:     Vital Signs Last 24 Hrs  T(C): 36.4 (19 Jan 2025 05:17), Max: 37.4 (18 Jan 2025 20:52)  T(F): 97.6 (19 Jan 2025 05:17), Max: 99.4 (18 Jan 2025 20:52)  HR: 89 (19 Jan 2025 05:17) (89 - 106)  BP: 107/68 (19 Jan 2025 05:17) (101/67 - 111/77)  RR: 18 (19 Jan 2025 05:17) (18 - 18)  SpO2: 96% (19 Jan 2025 05:17) (95% - 97%)    Parameters below as of 19 Jan 2025 05:17  Patient On (Oxygen Delivery Method): room air    Constitutional: in NAD, tearful, anxious   Cardiac: RRR   Respiratory: CTAB  Gastrointestinal: Soft, non-tender, non-distended  Extremities: No lower extremity edema   Breast: Left breast inflammatory mass, foul smelling, wrapped in ace bandage with blood noted.       MEDICATIONS  (STANDING):  influenza   Vaccine 0.5 milliLiter(s) IntraMuscular once  vancomycin  IVPB 1250 milliGRAM(s) IV Intermittent every 12 hours    MEDICATIONS  (PRN):  acetaminophen     Tablet .. 650 milliGRAM(s) Oral every 6 hours PRN Temp greater or equal to 38C (100.4F), Mild Pain (1 - 3)      CBC Full  -  ( 19 Jan 2025 12:16 )  WBC Count : 3.65 K/uL  RBC Count : 3.36 M/uL  Hemoglobin : 8.8 g/dL  Hematocrit : 29.0 %  Platelet Count - Automated : 331 K/uL  Mean Cell Volume : 86.3 fl  Mean Cell Hemoglobin : 26.2 pg  Mean Cell Hemoglobin Concentration : 30.3 g/dL  Auto Neutrophil # : x  Auto Lymphocyte # : x  Auto Monocyte # : x  Auto Eosinophil # : x  Auto Basophil # : x  Auto Neutrophil % : x  Auto Lymphocyte % : x  Auto Monocyte % : x  Auto Eosinophil % : x  Auto Basophil % : x      01-19    139  |  107  |  17  ----------------------------<  145[H]  4.0   |  25  |  0.65    Ca    8.8      19 Jan 2025 06:35  Phos  3.2     01-19  Mg     2.0     01-19    TPro  6.3  /  Alb  2.4[L]  /  TBili  0.3  /  DBili  x   /  AST  8[L]  /  ALT  15  /  AlkPhos  91  01-18    PT/INR - ( 18 Jan 2025 04:00 )   PT: 12.5 sec;   INR: 1.08 ratio       PTT - ( 18 Jan 2025 04:00 )  PTT:29.4 sec
    SUBJECTIVE / OVERNIGHT EVENTS:pt seen and examined  01-19-25     MEDICATIONS  (STANDING):  apixaban 5 milliGRAM(s) Oral every 12 hours  influenza   Vaccine 0.5 milliLiter(s) IntraMuscular once    MEDICATIONS  (PRN):  acetaminophen     Tablet .. 650 milliGRAM(s) Oral every 6 hours PRN Temp greater or equal to 38C (100.4F), Mild Pain (1 - 3)    T(C): 36.8 (01-19-25 @ 14:36), Max: 37.4 (01-18-25 @ 20:52)  HR: 93 (01-19-25 @ 14:36) (89 - 106)  BP: 101/65 (01-19-25 @ 14:36) (101/65 - 107/68)  RR: 18 (01-19-25 @ 14:36) (18 - 18)  SpO2: 95% (01-19-25 @ 14:36) (95% - 97%)    CAPILLARY BLOOD GLUCOSE        I&O's Summary      Constitutional: No fever, fatigue  Skin: No rash.  Eyes: No recent vision problems or eye pain.  ENT: No congestion, ear pain, or sore throat.  Cardiovascular: No chest pain or palpation.  Respiratory: No cough, shortness of breath, congestion, or wheezing.  Gastrointestinal: No abdominal pain, nausea, vomiting, or diarrhea.  Genitourinary: No dysuria.  Musculoskeletal: No joint swelling.  Neurologic: No headache.    PHYSICAL EXAM:  GENERAL: NAD  EYES: EOMI, PERRLA  NECK: Supple, No JVD  left breast dressing+  CHEST/LUNG: dec breath sounds at bases  HEART:  S1 , S2 +  ABDOMEN: soft , bs+  EXTREMITIES: edema   NEUROLOGY:alert awake      LABS:                        8.8    3.65  )-----------( 331      ( 19 Jan 2025 12:16 )             29.0     01-19    139  |  107  |  17  ----------------------------<  145[H]  4.0   |  25  |  0.65    Ca    8.8      19 Jan 2025 06:35  Phos  3.2     01-19  Mg     2.0     01-19    TPro  6.3  /  Alb  2.4[L]  /  TBili  0.3  /  DBili  x   /  AST  8[L]  /  ALT  15  /  AlkPhos  91  01-18    PT/INR - ( 18 Jan 2025 04:00 )   PT: 12.5 sec;   INR: 1.08 ratio         PTT - ( 18 Jan 2025 04:00 )  PTT:29.4 sec      Urinalysis Basic - ( 19 Jan 2025 06:35 )    Color: x / Appearance: x / SG: x / pH: x  Gluc: 145 mg/dL / Ketone: x  / Bili: x / Urobili: x   Blood: x / Protein: x / Nitrite: x   Leuk Esterase: x / RBC: x / WBC x   Sq Epi: x / Non Sq Epi: x / Bacteria: x        RADIOLOGY & ADDITIONAL TESTS:    Imaging Personally Reviewed:    Consultant(s) Notes Reviewed:      Care Discussed with Consultants/Other Providers:

## 2025-01-19 NOTE — DISCHARGE NOTE PROVIDER - CARE PROVIDER_API CALL
Jonn Berman  Surgery  [address], [address]  [place] [address]  Phone: [phone]  Fax: [phone]  Established Patient  Follow Up Time: 1 week    Your Oncologist,   Phone: (   )    -  Fax: (   )    -  Established Patient  Scheduled Appointment: 01/20/2025 08:00 AM

## 2025-01-19 NOTE — CONSULT NOTE ADULT - SUBJECTIVE AND OBJECTIVE BOX
HPI:  39yF, recently diagnosed with Left breast CA in nov 2024 on chemo, and PE that comes in for bleeding in the breast. Per patient hd been noted that for the past 1 week has noted non stop bleeding from the left breast. Patient recently started on eliquis around 2 weeks ago for PE and seen recently by surgery for an I&D of infected mass. Patient endorses that for the past week has been changing the dressings every 20 mins due to blood. Patient also endorses that has been feling SOB, dizzy and with sweats. Patient follows OP with  at [facility] and is on chemo which was given 2 weeks ago with taxol. Patient denies any other associated symptoms.  (18 Jan 2025 12:13)      PAST MEDICAL & SURGICAL HISTORY:  Breast cancer      Pulmonary embolism      No significant past surgical history          No Known Allergies      Meds:  acetaminophen     Tablet .. 650 milliGRAM(s) Oral every 6 hours PRN  apixaban 5 milliGRAM(s) Oral every 12 hours  influenza   Vaccine 0.5 milliLiter(s) IntraMuscular once  vancomycin  IVPB 1250 milliGRAM(s) IV Intermittent every 12 hours      SOCIAL HISTORY:  Smoker:  YES / NO        PACK YEARS:                         WHEN QUIT?  ETOH use:  YES / NO               FREQUENCY / QUANTITY:  Ilicit Drug use:  YES / NO  Occupation:  Assisted device use (Cane / Walker):  Live with:    FAMILY HISTORY:  No pertinent family history in first degree relatives        VITALS:  Vital Signs Last 24 Hrs  T(C): 36.8 (19 Jan 2025 14:36), Max: 37.4 (18 Jan 2025 20:52)  T(F): 98.2 (19 Jan 2025 14:36), Max: 99.4 (18 Jan 2025 20:52)  HR: 93 (19 Jan 2025 14:36) (89 - 106)  BP: 101/65 (19 Jan 2025 14:36) (101/65 - 111/77)  BP(mean): --  RR: 18 (19 Jan 2025 14:36) (18 - 18)  SpO2: 95% (19 Jan 2025 14:36) (95% - 97%)    Parameters below as of 19 Jan 2025 14:36  Patient On (Oxygen Delivery Method): room air        LABS/DIAGNOSTIC TESTS:                          8.8    3.65  )-----------( 331      ( 19 Jan 2025 12:16 )             29.0     WBC Count: 3.65 K/uL (01-19 @ 12:16)  WBC Count: 3.95 K/uL (01-19 @ 06:35)  WBC Count: 3.79 K/uL (01-18 @ 23:40)  WBC Count: 3.73 K/uL (01-18 @ 12:37)  WBC Count: 3.56 K/uL (01-18 @ 05:22)  WBC Count: 3.66 K/uL (01-18 @ 04:00)      01-19    139  |  107  |  17  ----------------------------<  145[H]  4.0   |  25  |  0.65    Ca    8.8      19 Jan 2025 06:35  Phos  3.2     01-19  Mg     2.0     01-19    TPro  6.3  /  Alb  2.4[L]  /  TBili  0.3  /  DBili  x   /  AST  8[L]  /  ALT  15  /  AlkPhos  91  01-18      Urinalysis Basic - ( 19 Jan 2025 06:35 )    Color: x / Appearance: x / SG: x / pH: x  Gluc: 145 mg/dL / Ketone: x  / Bili: x / Urobili: x   Blood: x / Protein: x / Nitrite: x   Leuk Esterase: x / RBC: x / WBC x   Sq Epi: x / Non Sq Epi: x / Bacteria: x        LIVER FUNCTIONS - ( 18 Jan 2025 04:00 )  Alb: 2.4 g/dL / Pro: 6.3 g/dL / ALK PHOS: 91 U/L / ALT: 15 U/L DA / AST: 8 U/L / GGT: x             PT/INR - ( 18 Jan 2025 04:00 )   PT: 12.5 sec;   INR: 1.08 ratio         PTT - ( 18 Jan 2025 04:00 )  PTT:29.4 sec    LACTATE:    ABG -     CULTURES:   .Blood BLOOD  01-18 @ 05:03   No growth at 24 hours  --  --      .Blood BLOOD  01-18 @ 04:53   No growth at 24 hours  --  --      .Abscess  12-31 @ 19:30   Numerous Proteus mirabilis  Few Staphylococcus aureus  --  Proteus mirabilis  Staphylococcus aureus              RADIOLOGY:< from: CT Angio Chest PE Protocol w/ IV Cont (01.18.25 @ 08:47) >  ACC: [id number] EXAM:  CT ANGIO CHEST PULM ART [facility]   ORDERED BY: BALWINDER CARBAJAL     PROCEDURE DATE:  01/18/2025          INTERPRETATION:  CLINICAL INDICATION: Chest pain    TECHNIQUE: A volumetric acquisition of the chest was obtained from the   thoracic inlet to the upper abdomen during dynamic administration of   intravenous contrast according to the PE protocol. 3D MIP images were   provided.    CONTRAST/COMPLICATIONS:  IV Contrast: Omnipaque 350  49 cc administered   51 cc discarded  OralContrast: NONE  Complications: .    COMPARISON: CT chest December 31, 2024.      FINDINGS:  Pulmonary Artery: The study is technically adequate with a good contrast   bolus to the pulmonary arteries. Decreased clot burden in a right lower   segmentalpulmonary embolism.    Aorta: Normal in course and caliber.    Cardiac: The heart is normal in size. No pericardial effusion.  Right   sided central venous access port with the tip terminating in the SVC.    Lungs/Airways/Pleura: Mild decrease in peripheral opacity in the   posterior segment of the right lower lobe with central lucency. The   central airways are patent. No pleural effusion.    Mediastinum/Lymph nodes: No thoracic adenopathy.    Upper Abdomen: Evaluation of the partially imaged upper abdomen   demonstrates no acute abnormality.    Bones and Soft Tissues: Partially imaged large left breast mass, now   containing central foci of air. Slight decrease in size of a 4.6 x 3.4 cm   irregular left superior breast mass/node (series 5, image 28). Unchanged   left axillary lymphadenopathy with the largest node measuring 2.6 cm in   short axis.      IMPRESSION:  Decreased clot burden in a segmental right lower pulmonary embolism.    Decreased pulmonary infarct in the posterior segmentof the right lower   lobe.    Multiple new foci of air within a partially imaged large left breast   mass. Correlate with surgical history.    --- End of Report ---            CHUY ASCENCIO DO; Attending Radiologist  This document has been electronically signed. Jan 18 2025  9:16AM    < end of copied text >        ROS  [  ] UNABLE TO ELICIT               HPI:  39yF, recently diagnosed with Left breast CA in nov 2024 on chemo, and PE that comes in for bleeding in the breast. Per patient hd been noted that for the past 1 week has noted non stop bleeding from the left breast. Patient recently started on eliquis around 2 weeks ago for PE and seen recently by surgery for an I&D of infected mass. Patient endorses that for the past week has been changing the dressings every 20 mins due to blood. Patient also endorses that has been feling SOB, dizzy and with sweats. Patient follows OP with  at [facility] and is on chemo which was given 2 weeks ago with taxol. Patient denies any other associated symptoms.  (18 Jan 2025 12:13)        History as above, asked to see this patient whom I know from her last admission recently when she had a abscess of her left breast at the site of a left breast biopsy of a mass. She presents with bleeding from her left breast wounds. She denies any fevers or chills, no other complaints , no SOB or chest pain. Asked to see the patient to R/O left breast cellulitis. She was given Vancomycin here. She is scheduled to get chemo tomorrow am and so wants to go home tonight. Pt seen and examined in the presence of her mother and nurse Bethanie.          PAST MEDICAL & SURGICAL HISTORY:  Breast cancer      Pulmonary embolism      No significant past surgical history          No Known Allergies      Meds:  acetaminophen     Tablet .. 650 milliGRAM(s) Oral every 6 hours PRN  apixaban 5 milliGRAM(s) Oral every 12 hours  influenza   Vaccine 0.5 milliLiter(s) IntraMuscular once  vancomycin  IVPB 1250 milliGRAM(s) IV Intermittent every 12 hours      SOCIAL HISTORY:  Smoker:  no  ETOH use:  no    FAMILY HISTORY:  No pertinent family history in first degree relatives        VITALS:  Vital Signs Last 24 Hrs  T(C): 36.8 (19 Jan 2025 14:36), Max: 37.4 (18 Jan 2025 20:52)  T(F): 98.2 (19 Jan 2025 14:36), Max: 99.4 (18 Jan 2025 20:52)  HR: 93 (19 Jan 2025 14:36) (89 - 106)  BP: 101/65 (19 Jan 2025 14:36) (101/65 - 111/77)  BP(mean): --  RR: 18 (19 Jan 2025 14:36) (18 - 18)  SpO2: 95% (19 Jan 2025 14:36) (95% - 97%)    Parameters below as of 19 Jan 2025 14:36  Patient On (Oxygen Delivery Method): room air        LABS/DIAGNOSTIC TESTS:                          8.8    3.65  )-----------( 331      ( 19 Jan 2025 12:16 )             29.0     WBC Count: 3.65 K/uL (01-19 @ 12:16)  WBC Count: 3.95 K/uL (01-19 @ 06:35)  WBC Count: 3.79 K/uL (01-18 @ 23:40)  WBC Count: 3.73 K/uL (01-18 @ 12:37)  WBC Count: 3.56 K/uL (01-18 @ 05:22)  WBC Count: 3.66 K/uL (01-18 @ 04:00)      01-19    139  |  107  |  17  ----------------------------<  145[H]  4.0   |  25  |  0.65    Ca    8.8      19 Jan 2025 06:35  Phos  3.2     01-19  Mg     2.0     01-19    TPro  6.3  /  Alb  2.4[L]  /  TBili  0.3  /  DBili  x   /  AST  8[L]  /  ALT  15  /  AlkPhos  91  01-18      Urinalysis Basic - ( 19 Jan 2025 06:35 )    Color: x / Appearance: x / SG: x / pH: x  Gluc: 145 mg/dL / Ketone: x  / Bili: x / Urobili: x   Blood: x / Protein: x / Nitrite: x   Leuk Esterase: x / RBC: x / WBC x   Sq Epi: x / Non Sq Epi: x / Bacteria: x        LIVER FUNCTIONS - ( 18 Jan 2025 04:00 )  Alb: 2.4 g/dL / Pro: 6.3 g/dL / ALK PHOS: 91 U/L / ALT: 15 U/L DA / AST: 8 U/L / GGT: x             PT/INR - ( 18 Jan 2025 04:00 )   PT: 12.5 sec;   INR: 1.08 ratio         PTT - ( 18 Jan 2025 04:00 )  PTT:29.4 sec    LACTATE:    ABG -     CULTURES:   .Blood BLOOD  01-18 @ 05:03   No growth at 24 hours  --  --      .Blood BLOOD  01-18 @ 04:53   No growth at 24 hours  --  --                  RADIOLOGY:< from: CT Angio Chest PE Protocol w/ IV Cont (01.18.25 @ 08:47) >  ACC: [id number] EXAM:  CT ANGIO CHEST PULM ART [facility]   ORDERED BY: BALWINDER CARBAJAL     PROCEDURE DATE:  01/18/2025          INTERPRETATION:  CLINICAL INDICATION: Chest pain    TECHNIQUE: A volumetric acquisition of the chest was obtained from the   thoracic inlet to the upper abdomen during dynamic administration of   intravenous contrast according to the PE protocol. 3D MIP images were   provided.    CONTRAST/COMPLICATIONS:  IV Contrast: Omnipaque 350  49 cc administered   51 cc discarded  OralContrast: NONE  Complications: .    COMPARISON: CT chest December 31, 2024.      FINDINGS:  Pulmonary Artery: The study is technically adequate with a good contrast   bolus to the pulmonary arteries. Decreased clot burden in a right lower   segmentalpulmonary embolism.    Aorta: Normal in course and caliber.    Cardiac: The heart is normal in size. No pericardial effusion.  Right   sided central venous access port with the tip terminating in the SVC.    Lungs/Airways/Pleura: Mild decrease in peripheral opacity in the   posterior segment of the right lower lobe with central lucency. The   central airways are patent. No pleural effusion.    Mediastinum/Lymph nodes: No thoracic adenopathy.    Upper Abdomen: Evaluation of the partially imaged upper abdomen   demonstrates no acute abnormality.    Bones and Soft Tissues: Partially imaged large left breast mass, now   containing central foci of air. Slight decrease in size of a 4.6 x 3.4 cm   irregular left superior breast mass/node (series 5, image 28). Unchanged   left axillary lymphadenopathy with the largest node measuring 2.6 cm in   short axis.      IMPRESSION:  Decreased clot burden in a segmental right lower pulmonary embolism.    Decreased pulmonary infarct in the posterior segmentof the right lower   lobe.    Multiple new foci of air within a partially imaged large left breast   mass. Correlate with surgical history.    --- End of Report ---            CHUY ASCENCIO DO; Attending Radiologist  This document has been electronically signed. Jan 18 2025  9:16AM    < end of copied text >        ROS  [  ] UNABLE TO ELICIT

## 2025-01-19 NOTE — CONSULT NOTE ADULT - ASSESSMENT
38 y/o f with PMHx of left breast inflammatory breast cancer currently receiving neoadjuvant chemotherapy (1st cycle 12/2024, 2nd cycle 1/13/25) complicated by PE on AC, breast abscess s/p I&D (12/31/2024 blood cx  grew Staphylococcus Aureus and Proteus Mirabalis. ) now with left breast bleeding and swelling.  s/p punch biopsy done.  Initially thought to be mastitis  prescribed multiple courses of antibiotics with no improvement. Of note, had PE since admission at [facility] in 12/2024 and at the time declined thrombectomy and only for AC (eliquis).     Large Lt Breast Hematoma   Consider serial CBC , q6-8hr   Dressing Change this AM-Surgery  Heme/Onc consult   Breast US this AM  Other care/mgmt per primary team .   Will follow   
Left breast ulcers- no signs of cellulitis or infection at this time.      Plan - DC all antibiotics  DC planning  reconsult prn.

## 2025-01-19 NOTE — DISCHARGE NOTE PROVIDER - PROVIDER TOKENS
PROVIDER:[TOKEN:[[id number]],FOLLOWUP:[1 week],ESTABLISHEDPATIENT:[T]],FREE:[LAST:[Your Oncologist],PHONE:[(   )    -],FAX:[(   )    -],SCHEDULEDAPPT:[01/20/2025],SCHEDULEDAPPTTIME:[08:00 AM],ESTABLISHEDPATIENT:[T]]

## 2025-01-19 NOTE — DISCHARGE NOTE NURSING/CASE MANAGEMENT/SOCIAL WORK - NSDCPEFALRISK_GEN_ALL_CORE
For information on Fall & Injury Prevention, visit: [email or web address] OR  [email or web address] OR  https://www.cdc.gov/steadi/patient.html

## 2025-01-19 NOTE — PROGRESS NOTE ADULT - ASSESSMENT
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  Hemoglobin on admission 9.1. Currently on Vancomycin. Hematology consulted further management.    Triple negative breast cancer (T4N3)  - currently on MAREK with carbo+Taxol+Keytruda, cycle 1 on 01/13/2025 (LD on 1/18/25, C1D8)   - No plans for inpatient chemotherapy.  - outpatient follow up with Dr. Quezada at [facility] on discharge     Left breast infection/Abscess   - s/p recent I&D in 12/2024   - currently on vancomycin  - management per surgery    Anemia   - likely due to infection, recent chemo, acute blood loss and iron def   - check iron panel, ferritin, B12, folate, LDH, Hapto and Retic count   - Hb today is 8.8, baseline is 10-11     H/o PE and catheter associated SVC   - on Eliquis as outpatient, which is currently on hold due to bleeding from the breast   - repeat CT chest from 1/18/25 showed decreased clot burden in a segmental right lower pulmonary embolism. Decreased pulmonary infarct in the posterior segment of the right lower   lobe.  - If patient is hemodynamically stable and not having clinically significant bleeding, and if okay with surgery team would recommend starting heparin drip or Lovenox for anticoagulation given her recent history of PE and closely monitoring her CBC every 12 hours x 24-48hrs. If no bleeding is noted, then can resume her eliquis.     Will continue to follow     Luma Causey MD   [facility]
39yF, recently diagnosed with Left breast CA in nov 2024 on chemo, and PE that comes in for bleeding in the breast. Patient to be admitted for sepsis 2/2 cellulitis and breast hematoma

## 2025-01-19 NOTE — DISCHARGE NOTE PROVIDER - HOSPITAL COURSE
39yF, recently diagnosed with Left breast CA in nov 2024 on chemo, and PE that comes in for bleeding in the breast. Per patient hd been noted that for the past 1 week has noted non stop bleeding from the left breast. Patient recently started on eliquis around 2 weeks ago for PE and seen recently by surgery for an I&D of infected mass. Patient endorses that for the past week has been changing the dressings every 20 mins due to blood. Patient also endorses that has been feling SOB, dizzy and with sweats. Patient follows OP with  at [facility] and is on chemo which was given 2 weeks ago with taxol. Patient denies any other associated symptoms.     In the ED: Temp 98.1, , /79, RR 18, Pulseox 96% on RA. Surgery was called and patient was admitted for monitoring and concern for cellulitis/infection    #Left breast wound  Chest CTA showed slight dec in size of 4.6 x 3.4cm irregular left superior breast mass/node, unchanged left axillary lymphadenopathy with largest node measuring 2.6cm in short axis  Per surgery:  Lateral breast with 4x4 3 o colock maladorous fungating mass with sloughing tissue and small amount of blood oozing noted  Midline inferior 6 oclock lesion noted on volar aspect of breast  Recommended for cleaning with saline and 4x4  Apply Telfa, 4x4, abd pad, cling wrap, and 2 ACE bandages wrapped around contralateral shoulder to provide pressure dressing  Change twice daily or when soiled  Pt was started on vancomycin empirically for concern of infection given pt is immunocompromised on chemp  Infectious Disease was consulted  NO signs of infection, no recommendations of any further antibiotics  Bleeding slowed down  Hgb remained stable  Case management to reinstate wound care  Pt has f/u with Surgery team Dr. Berman    #Breast CA/Pulmonary Embolism  Chest CTA done showing decreased clot burden in segmental right lower pulmonary embolism  Decreased pulmonary infarct in posterior segment of right lower lobe  Hgb remained stable  Heme/onc consulted and rec restarted eliquis  Pt has ashvin f/u with heme/onc on 1/20/25 for plans for chemo  Pt did not want to wait for continued monitoring while restarting eliquis and trending hgb, shared decision with Med Attending and pt was for discharge with ashvin follow-up with heme/onc.  Pt knows can return to ED/hospital if breast wound bleeding recurs/worsens

## 2025-01-19 NOTE — DISCHARGE NOTE PROVIDER - NSDCCPCAREPLAN_GEN_ALL_CORE_FT
PRINCIPAL DISCHARGE DIAGNOSIS  Diagnosis: Bleeding from wound  Assessment and Plan of Treatment: You came in with bleeding from the left breast wound.  You were see by the Surgery team and they cleaned and redressed the wound.  They recommended holding anticoagulation and monitoring.  You were started on antibiotics for concern of infection.  Your blood count remained stable and the bleeding slowed down.    You were seen by Infectious Disease who did not feel there was any infection and did NOT recommend any further antibiotics.   You were seen by Hematology/Oncology who recommended restarting Eliquis.  Follow-up closely with Surgery team in the office.  Follow-up closely with the Oncologist.  We recommend a repeat CBC (Blood count) in 1 week.  Return to the hospital with recurrent bleeding that does not stop, lightheadedness, dizziness, fainting/syncope, chest pain, or shortness of breath.      SECONDARY DISCHARGE DIAGNOSES  Diagnosis: Pulmonary embolism  Assessment and Plan of Treatment: Chest CTA showed the lung clot is getting smaller.  Continue taking your Eliquis and follow-up with the Hematology/Oncologist.

## 2025-01-19 NOTE — DISCHARGE NOTE PROVIDER - NSDCFUADDINST_GEN_ALL_CORE_FT
Left Breast Wound care Instructions:  Twice daily or when soiled:  Cleaned with 4x4 and saline solution.   Apply Telfa, 4x4 gauze, abd pad, cling wrap, and 2x Ace bandages wrapped around shoulder to provide pressure dressing.

## 2025-01-19 NOTE — CONSULT NOTE ADULT - SKIN COMMENTS
left breast with 2 open ulcers that are bleeding mainly on the dressing and has improved dramatically per the  patient , she has a large palpable mass under the the larger ulcer but no erythema or warmth of skin

## 2025-01-19 NOTE — CONSULT NOTE ADULT - SUBJECTIVE AND OBJECTIVE BOX
Schedule next appt. Mail future lab orders. Time of visit:    CHIEF COMPLAINT: Patient is a 39y old  Female who presents with a chief complaint of Left breast wound bleeding (19 Jan 2025 17:11)      HPI:  39yF, recently diagnosed with Left breast CA in nov 2024 on chemo, and PE that comes in for bleeding in the breast. Per patient hd been noted that for the past 1 week has noted non stop bleeding from the left breast. Patient recently started on eliquis around 2 weeks ago for PE and seen recently by surgery for an I&D of infected mass. Patient endorses that for the past week has been changing the dressings every 20 mins due to blood. Patient also endorses that has been feling SOB, dizzy and with sweats. Patient follows OP with  at [facility] and is on chemo which was given 2 weeks ago with taxol. Patient denies any other associated symptoms.  (18 Jan 2025 12:13)   Patient seen and examined.     PAST MEDICAL & SURGICAL HISTORY:  Breast cancer      Pulmonary embolism      No significant past surgical history          Allergies    No Known Allergies    Intolerances        MEDICATIONS  (STANDING):  apixaban 5 milliGRAM(s) Oral every 12 hours  influenza   Vaccine 0.5 milliLiter(s) IntraMuscular once      MEDICATIONS  (PRN):  acetaminophen     Tablet .. 650 milliGRAM(s) Oral every 6 hours PRN Temp greater or equal to 38C (100.4F), Mild Pain (1 - 3)   Medications up to date at time of exam.    Medications up to date at time of exam.    FAMILY HISTORY:  No pertinent family history in first degree relatives        SOCIAL HISTORY  Smoking History: [   ] smoking/smoke exposure, [   ] former smoker  Living Condition: [   ] apartment, [   ] private house  Work History:   Travel History: denies recent travel  Illicit Substance Use: denies  Alcohol Use: denies    REVIEW OF SYSTEMS:    CONSTITUTIONAL:  denies fevers, chills, sweats, weight loss    HEENT:  denies diplopia or blurred vision, sore throat or runny nose.    CARDIOVASCULAR:  denies pressure, squeezing, tightness, or heaviness about the chest; no palpitations.    RESPIRATORY:  denies SOB, cough, BROWN, wheezing.    GASTROINTESTINAL:  denies abdominal pain, nausea, vomiting or diarrhea.    GENITOURINARY: denies dysuria, frequency or urgency.    NEUROLOGIC:  denies numbness, tingling, seizures or weakness.    PSYCHIATRIC:  denies disorder of thought or mood.    MSK: denies swelling, redness      PHYSICAL EXAMINATION:    GENERAL: The patient  in no apparent distress.     Vital Signs Last 24 Hrs  T(C): 36.8 (19 Jan 2025 14:36), Max: 37.4 (18 Jan 2025 20:52)  T(F): 98.2 (19 Jan 2025 14:36), Max: 99.4 (18 Jan 2025 20:52)  HR: 93 (19 Jan 2025 14:36) (89 - 106)  BP: 101/65 (19 Jan 2025 14:36) (101/65 - 107/68)  BP(mean): --  RR: 18 (19 Jan 2025 14:36) (18 - 18)  SpO2: 95% (19 Jan 2025 14:36) (95% - 97%)    Parameters below as of 19 Jan 2025 14:36  Patient On (Oxygen Delivery Method): room air       (if applicable)    Chest Tube (if applicable)    HEENT: Head is normocephalic and atraumatic. Extraocular muscles are intact. Mucous membranes are moist.     NECK: Supple, no palpable adenopathy.    LUNGS: Fair b/l breath sounds, no wheezing, rales, or rhonchi.    HEART: Regular rate and rhythm without murmur.    ABDOMEN: Soft, nontender, and nondistended.  No hepatosplenomegaly is noted.    RENAL: No difficulty voiding, no pelvic pain    EXTREMITIES: Without any cyanosis, clubbing, rash, lesions or edema.    NEUROLOGIC: Awake, alert, oriented, grossly intact    SKIN: Warm, dry, good turgor.      LABS:                        8.8    3.65  )-----------( 331      ( 19 Jan 2025 12:16 )             29.0     01-19    139  |  107  |  17  ----------------------------<  145[H]  4.0   |  25  |  0.65    Ca    8.8      19 Jan 2025 06:35  Phos  3.2     01-19  Mg     2.0     01-19    TPro  6.3  /  Alb  2.4[L]  /  TBili  0.3  /  DBili  x   /  AST  8[L]  /  ALT  15  /  AlkPhos  91  01-18    PT/INR - ( 18 Jan 2025 04:00 )   PT: 12.5 sec;   INR: 1.08 ratio         PTT - ( 18 Jan 2025 04:00 )  PTT:29.4 sec  Urinalysis Basic - ( 19 Jan 2025 06:35 )    Color: x / Appearance: x / SG: x / pH: x  Gluc: 145 mg/dL / Ketone: x  / Bili: x / Urobili: x   Blood: x / Protein: x / Nitrite: x   Leuk Esterase: x / RBC: x / WBC x   Sq Epi: x / Non Sq Epi: x / Bacteria: x    MICROBIOLOGY: (if applicable)    RADIOLOGY & ADDITIONAL STUDIES:  EKG:   CXR:< from: CT Angio Chest PE Protocol w/ IV Cont (01.18.25 @ 08:47) >  IMPRESSION:  Decreased clot burden in a segmental right lower pulmonary embolism.    Decreased pulmonary infarct in the posterior segmentof the right lower   lobe.    Multiple new foci of air within a partially imaged large left breast   mass. Correlate with surgical history.      < end of copied text >    ECHO:    IMPRESSION: 39y Female PAST MEDICAL & SURGICAL HISTORY:  Breast cancer      Pulmonary embolism      No significant past surgical history       p/w                  Time of visit: pa known to me from prior admission     CHIEF COMPLAINT: Patient is a 39y old  Female who presents with a chief complaint of Left breast wound bleeding (19 Jan 2025 17:11)      HPI: This is a 39 yr old woman  recently diagnosed with Left breast CA in nov 2024 on chemo, and PE that comes in for bleeding in the breast. Per patient hd been noted that for the past 1 week has noted non stop bleeding from the left breast. Patient recently started on eliquis around 2 weeks ago for PE and seen recently by surgery for an I&D of infected mass. Patient endorses that for the past week has been changing the dressings every 20 mins due to blood. Patient also endorses that has been feling SOB, dizzy and with sweats. Patient follows OP with  at [facility] and is on chemo which was given 2 weeks ago with taxol. Patient denies any other associated symptoms.  (18 Jan 2025 12:13)   Patient seen and examined.     PAST MEDICAL & SURGICAL HISTORY:  Breast cancer      Pulmonary embolism      No significant past surgical history          Allergies    No Known Allergies    Intolerances        MEDICATIONS  (STANDING):  apixaban 5 milliGRAM(s) Oral every 12 hours  influenza   Vaccine 0.5 milliLiter(s) IntraMuscular once      MEDICATIONS  (PRN):  acetaminophen     Tablet .. 650 milliGRAM(s) Oral every 6 hours PRN Temp greater or equal to 38C (100.4F), Mild Pain (1 - 3)   Medications up to date at time of exam.    Medications up to date at time of exam.    FAMILY HISTORY:  No pertinent family history in first degree relatives        SOCIAL HISTORY  Smoking History: [   ] smoking/smoke exposure, [   ] former smoker  Living Condition: [   ] apartment, [   ] private house  Work History:   Travel History: denies recent travel  Illicit Substance Use: denies  Alcohol Use: denies    REVIEW OF SYSTEMS:    CONSTITUTIONAL:  denies fevers, chills, sweats, weight loss    HEENT:  denies diplopia or blurred vision, sore throat or runny nose.    CARDIOVASCULAR:  denies pressure, squeezing, tightness, or heaviness about the chest; no palpitations.    RESPIRATORY:  denies SOB, cough, BROWN, wheezing.    GASTROINTESTINAL:  denies abdominal pain, nausea, vomiting or diarrhea.    GENITOURINARY: denies dysuria, frequency or urgency.    NEUROLOGIC:  denies numbness, tingling, seizures or weakness.    PSYCHIATRIC:  denies disorder of thought or mood.    MSK: denies swelling, redness      PHYSICAL EXAMINATION:    GENERAL: The patient  in no apparent distress.     Vital Signs Last 24 Hrs  T(C): 36.8 (19 Jan 2025 14:36), Max: 37.4 (18 Jan 2025 20:52)  T(F): 98.2 (19 Jan 2025 14:36), Max: 99.4 (18 Jan 2025 20:52)  HR: 93 (19 Jan 2025 14:36) (89 - 106)  BP: 101/65 (19 Jan 2025 14:36) (101/65 - 107/68)  BP(mean): --  RR: 18 (19 Jan 2025 14:36) (18 - 18)  SpO2: 95% (19 Jan 2025 14:36) (95% - 97%)    Parameters below as of 19 Jan 2025 14:36  Patient On (Oxygen Delivery Method): room air       (if applicable)    Chest Tube (if applicable)    HEENT: Head is normocephalic and atraumatic. Extraocular muscles are intact. Mucous membranes are moist.     NECK: Supple, no palpable adenopathy.    LUNGS: Fair b/l breath sounds, no wheezing, rales, or rhonchi.    Breast: left breast with dressing , oozing     HEART: Regular rate and rhythm without murmur.    ABDOMEN: Soft, nontender, and nondistended.  No hepatosplenomegaly is noted.    RENAL: No difficulty voiding, no pelvic pain    EXTREMITIES: Without any cyanosis, clubbing, rash, lesions or edema.    NEUROLOGIC: Awake, alert, oriented, grossly intact    SKIN: Warm, dry, good turgor.      LABS:                        8.8    3.65  )-----------( 331      ( 19 Jan 2025 12:16 )             29.0     01-19    139  |  107  |  17  ----------------------------<  145[H]  4.0   |  25  |  0.65    Ca    8.8      19 Jan 2025 06:35  Phos  3.2     01-19  Mg     2.0     01-19    TPro  6.3  /  Alb  2.4[L]  /  TBili  0.3  /  DBili  x   /  AST  8[L]  /  ALT  15  /  AlkPhos  91  01-18    PT/INR - ( 18 Jan 2025 04:00 )   PT: 12.5 sec;   INR: 1.08 ratio         PTT - ( 18 Jan 2025 04:00 )  PTT:29.4 sec  Urinalysis Basic - ( 19 Jan 2025 06:35 )    Color: x / Appearance: x / SG: x / pH: x  Gluc: 145 mg/dL / Ketone: x  / Bili: x / Urobili: x   Blood: x / Protein: x / Nitrite: x   Leuk Esterase: x / RBC: x / WBC x   Sq Epi: x / Non Sq Epi: x / Bacteria: x    MICROBIOLOGY: (if applicable)    RADIOLOGY & ADDITIONAL STUDIES:  EKG:   CXR:< from: CT Angio Chest PE Protocol w/ IV Cont (01.18.25 @ 08:47) >  IMPRESSION:  Decreased clot burden in a segmental right lower pulmonary embolism.    Decreased pulmonary infarct in the posterior segmentof the right lower   lobe.    Multiple new foci of air within a partially imaged large left breast   mass. Correlate with surgical history.      < end of copied text >    ECHO:    IMPRESSION: 39y Female PAST MEDICAL & SURGICAL HISTORY:  Breast cancer      Pulmonary embolism      No significant past surgical history       p/w       IMP: This is a 39 yr old woman  recently diagnosed with Left breast CA in nov 2024 on chemo, and PE that comes in for bleeding in the breast. Per patient hd been noted that for the past 1 week has noted non stop bleeding from the left breast. Patient recently started on eliquis around 2 weeks ago for PE.  Repeat CTPA with decrease clot burden .  Pat not requiring  supp o2     Sugg  - Continue anticoag   - Monitor for blooding   - Antibx as per ID   - Wound care   - Out pat f/u with pulmo and hemo/ onc     spoke with mother at bedside

## 2025-01-19 NOTE — DISCHARGE NOTE NURSING/CASE MANAGEMENT/SOCIAL WORK - PATIENT PORTAL LINK FT
You can access the FollowMyHealth Patient Portal offered by [facility] by registering at the following website: [email or web address]. By joining [employer] [facility]’s FollowMyHealth portal, you will also be able to view your health information using other applications (apps) compatible with our system.

## 2025-01-23 ENCOUNTER — APPOINTMENT (OUTPATIENT)
Dept: CARDIOLOGY | Facility: CLINIC | Age: 40
End: 2025-01-23

## 2025-01-23 DIAGNOSIS — I26.99 OTHER PULMONARY EMBOLISM W/OUT ACUTE COR PULMONALE: ICD-10-CM

## 2025-01-23 DIAGNOSIS — I82.210 ACUTE EMBOLISM AND THROMBOSIS OF SUPERIOR VENA CAVA: ICD-10-CM

## 2025-01-23 DIAGNOSIS — E87.1 HYPO-OSMOLALITY AND HYPONATREMIA: ICD-10-CM

## 2025-01-23 DIAGNOSIS — U07.1 COVID-19: ICD-10-CM

## 2025-01-23 DIAGNOSIS — D64.9 ANEMIA, UNSPECIFIED: ICD-10-CM

## 2025-01-23 DIAGNOSIS — T45.1X5A IMMUNODEFICIENCY DUE TO DRUGS: ICD-10-CM

## 2025-01-23 DIAGNOSIS — D84.821 IMMUNODEFICIENCY DUE TO DRUGS: ICD-10-CM

## 2025-01-23 DIAGNOSIS — Z79.69 IMMUNODEFICIENCY DUE TO DRUGS: ICD-10-CM

## 2025-01-23 RX ORDER — APIXABAN 5 MG/1
5 TABLET, FILM COATED ORAL
Qty: 30 | Refills: 0 | Status: ACTIVE | COMMUNITY

## 2025-01-23 RX ORDER — FOLIC ACID 1 MG/1
1 TABLET ORAL
Refills: 0 | Status: ACTIVE | COMMUNITY

## 2025-01-29 ENCOUNTER — NON-APPOINTMENT (OUTPATIENT)
Age: 40
End: 2025-01-29

## 2025-06-12 ENCOUNTER — EMERGENCY (EMERGENCY)
Facility: HOSPITAL | Age: 40
LOS: 1 days | End: 2025-06-12
Attending: EMERGENCY MEDICINE
Payer: MEDICAID

## 2025-06-12 VITALS
HEART RATE: 92 BPM | WEIGHT: 227.96 LBS | TEMPERATURE: 99 F | HEIGHT: 64 IN | RESPIRATION RATE: 17 BRPM | OXYGEN SATURATION: 95 % | SYSTOLIC BLOOD PRESSURE: 96 MMHG | DIASTOLIC BLOOD PRESSURE: 63 MMHG

## 2025-06-12 LAB
T4 AB SER-ACNC: 2.5 UG/DL — LOW (ref 4.6–12)
TSH SERPL-MCNC: 41.8 UU/ML — HIGH (ref 0.34–4.82)

## 2025-06-12 PROCEDURE — 84443 ASSAY THYROID STIM HORMONE: CPT

## 2025-06-12 PROCEDURE — 84439 ASSAY OF FREE THYROXINE: CPT

## 2025-06-12 PROCEDURE — 36415 COLL VENOUS BLD VENIPUNCTURE: CPT

## 2025-06-12 PROCEDURE — 84480 ASSAY TRIIODOTHYRONINE (T3): CPT

## 2025-06-12 PROCEDURE — 99283 EMERGENCY DEPT VISIT LOW MDM: CPT

## 2025-06-12 PROCEDURE — 84436 ASSAY OF TOTAL THYROXINE: CPT

## 2025-06-12 PROCEDURE — 99284 EMERGENCY DEPT VISIT MOD MDM: CPT

## 2025-06-12 NOTE — ED PROVIDER NOTE - CARE PROVIDERS DIRECT ADDRESSES
,DirectAddress_Unknown,[email or web address]

## 2025-06-12 NOTE — ED ADULT NURSE NOTE - NURSING MUSC JOINTS
[FreeTextEntry1] : \ana Finley is a healthy 52 year old woman with recent onset dyspnea (when climbing stairs) and atypical angina in the setting of elevated cholesterol and family history of premature heart disease; resting 12-lead ECG is normal.  I recommend exercise stress testing (ECG) and echocardiography to further evaluate these symptoms.  She plans to have another fasting lipid panel drawn later this month following a period of exercise / improved diet -- we will discuss management when results are available.
no pain, swelling or deformity of joints

## 2025-06-12 NOTE — ED PROVIDER NOTE - NS ED ATTENDING STATEMENT MOD
HFrEF last echo 1/2023 EF 45%  - BB held in setting of GI bleed and low BP\  - euvolemic   - Cards - Dr. Lisa Attending Only

## 2025-06-12 NOTE — ED PROVIDER NOTE - NSFOLLOWUPCLINICS_GEN_ALL_ED_FT
[facility]  Endocrinology  [address] [place] [address]  Phone: [phone]  Fax: [phone]

## 2025-06-12 NOTE — ED PROVIDER NOTE - CARE PROVIDER_API CALL
Andra Velazquez  Endocrinology/Metab/Diabetes  [address] [place] [id number]  Phone: [phone]  Fax: [phone]  Follow Up Time:     Danielle Alvarez  Endocrinology/Metab/Diabetes  [address] [facility] [address]  [place] [address]  Phone: [phone]  Fax: [phone]  Follow Up Time:

## 2025-06-12 NOTE — ED PROVIDER NOTE - OBJECTIVE STATEMENT
39 year old female denies PMH coming in for abnormal thyroid labs. pt states her TSH was elevated. Pt asking for labs to be done to re-check. denies all other complaints.

## 2025-06-12 NOTE — ED PROVIDER NOTE - PATIENT PORTAL LINK FT
You can access the FollowMyHealth Patient Portal offered by [facility] by registering at the following website: [email or web address]. By joining [facility]’s FollowMyHealth portal, you will also be able to view your health information using other applications (apps) compatible with our system.

## 2025-06-12 NOTE — ED ADULT NURSE NOTE - NSFALLUNIVINTERV_ED_ALL_ED
Bed/Stretcher in lowest position, wheels locked, appropriate side rails in place/Call bell, personal items and telephone in reach/Instruct patient to call for assistance before getting out of bed/chair/stretcher/Non-slip footwear applied when patient is off stretcher/[place] to call system/Physically safe environment - no spills, clutter or unnecessary equipment/Purposeful proactive rounding/Room/bathroom lighting operational, light cord in reach

## 2025-06-12 NOTE — ED PROVIDER NOTE - PROGRESS NOTE DETAILS
Labs consistent with hypothyroid.  Asymptomatic at this time.  Advised to follow-up with primary care and endocrinology for management.  Return precautions discussed.

## 2025-06-12 NOTE — ED ADULT NURSE NOTE - OBJECTIVE STATEMENT
Patient presented in ED to get treatment for her High TSH levels, she said that her PCP called her 3 weeks ago informing her of her blood result. Patient didn't follow up to PCP. Patient also concern about puffy eyes since she got flu-like symptoms for 3 days. Patient denies chest pain/sob.

## 2025-06-13 LAB
T3 SERPL-MCNC: <20 NG/DL — LOW (ref 80–200)
T4 FREE SERPL-MCNC: <0.1 NG/DL — LOW (ref 0.9–1.8)

## 2025-08-26 ENCOUNTER — APPOINTMENT (OUTPATIENT)
Dept: ENDOCRINOLOGY | Facility: CLINIC | Age: 40
End: 2025-08-26

## 2025-09-04 ENCOUNTER — APPOINTMENT (OUTPATIENT)
Dept: ENDOCRINOLOGY | Facility: CLINIC | Age: 40
End: 2025-09-04
Payer: MEDICAID

## 2025-09-04 VITALS
OXYGEN SATURATION: 95 % | TEMPERATURE: 97.5 F | BODY MASS INDEX: 39.09 KG/M2 | SYSTOLIC BLOOD PRESSURE: 105 MMHG | HEART RATE: 76 BPM | RESPIRATION RATE: 17 BRPM | WEIGHT: 229 LBS | HEIGHT: 64 IN | DIASTOLIC BLOOD PRESSURE: 72 MMHG

## 2025-09-04 DIAGNOSIS — N91.1 SECONDARY AMENORRHEA: ICD-10-CM

## 2025-09-04 DIAGNOSIS — E03.9 HYPOTHYROIDISM, UNSPECIFIED: ICD-10-CM

## 2025-09-04 DIAGNOSIS — E66.9 OBESITY, UNSPECIFIED: ICD-10-CM

## 2025-09-04 DIAGNOSIS — Z92.26: ICD-10-CM

## 2025-09-04 DIAGNOSIS — E55.9 VITAMIN D DEFICIENCY, UNSPECIFIED: ICD-10-CM

## 2025-09-04 PROCEDURE — 99204 OFFICE O/P NEW MOD 45 MIN: CPT

## 2025-09-04 RX ORDER — LEVOTHYROXINE SODIUM 50 UG/1
50 TABLET ORAL DAILY
Qty: 90 | Refills: 1 | Status: ACTIVE | COMMUNITY
Start: 2025-09-04 | End: 1900-01-01

## 2025-09-18 ENCOUNTER — APPOINTMENT (OUTPATIENT)
Dept: ENDOCRINOLOGY | Facility: CLINIC | Age: 40
End: 2025-09-18